# Patient Record
Sex: MALE | Race: OTHER
[De-identification: names, ages, dates, MRNs, and addresses within clinical notes are randomized per-mention and may not be internally consistent; named-entity substitution may affect disease eponyms.]

---

## 2020-03-16 ENCOUNTER — HOSPITAL ENCOUNTER (EMERGENCY)
Dept: HOSPITAL 12 - ER | Age: 45
Discharge: HOME | End: 2020-03-16
Payer: MEDICAID

## 2020-03-16 VITALS — BODY MASS INDEX: 28.14 KG/M2 | WEIGHT: 190 LBS | HEIGHT: 69 IN

## 2020-03-16 VITALS — DIASTOLIC BLOOD PRESSURE: 90 MMHG | SYSTOLIC BLOOD PRESSURE: 148 MMHG
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
## 2020-03-16 DIAGNOSIS — Z79.899: ICD-10-CM
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
## 2020-03-16 DIAGNOSIS — S49.92XA: Primary | ICD-10-CM

## 2020-03-16 PROCEDURE — A4663 DIALYSIS BLOOD PRESSURE CUFF: HCPCS

## 2020-03-16 NOTE — NUR
Patient discharged to home in stable conditon with friend taking patient home.  
Written and verbal after care instructions given. 

Patient verbalizes understanding of instructions. Walked out of ER with no 
distress noted.

## 2020-03-16 NOTE — NUR
Patient ambulating with steady gait. A&O x4. c/o left shoulder pain s/p 
mechanical fall 30-45 mins PTA. Patient states they installed some non slid 
luba at home and came loose with the rain that caused him to fall. Patient 
states he landed on left shouler. 10/10 on the pain scale. Patient able to move 
all digits freely. pulses palpable. denies pain radiating. Speech is clear and 
able to make needs known / follow commands. Breathing even and unlabored. 
denies any cough or SOB. Denies any  / GI distress. Patient's partner Robert 
at bedside to drive patient home

## 2020-05-04 ENCOUNTER — HOSPITAL ENCOUNTER (EMERGENCY)
Dept: HOSPITAL 12 - ER | Age: 45
LOS: 1 days | Discharge: HOME | End: 2020-05-05
Payer: COMMERCIAL

## 2020-05-04 VITALS — WEIGHT: 189 LBS | BODY MASS INDEX: 27.99 KG/M2 | HEIGHT: 69 IN

## 2020-05-04 DIAGNOSIS — S00.81XA: ICD-10-CM
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
## 2020-05-04 DIAGNOSIS — R51: ICD-10-CM

## 2020-05-04 DIAGNOSIS — S01.01XA: Primary | ICD-10-CM

## 2020-05-04 DIAGNOSIS — R40.2412: ICD-10-CM

## 2020-05-04 DIAGNOSIS — M50.323: ICD-10-CM

## 2020-05-04 PROCEDURE — A4217 STERILE WATER/SALINE, 500 ML: HCPCS

## 2020-05-04 PROCEDURE — 72125 CT NECK SPINE W/O DYE: CPT

## 2020-05-04 PROCEDURE — 70450 CT HEAD/BRAIN W/O DYE: CPT

## 2020-05-04 PROCEDURE — 99285 EMERGENCY DEPT VISIT HI MDM: CPT

## 2020-05-04 PROCEDURE — 12001 RPR S/N/AX/GEN/TRNK 2.5CM/<: CPT

## 2020-05-04 PROCEDURE — L8699 PROSTHETIC IMPLANT NOS: HCPCS

## 2020-05-04 PROCEDURE — 0HQ0XZZ REPAIR SCALP SKIN, EXTERNAL APPROACH: ICD-10-PCS | Performed by: EMERGENCY MEDICINE

## 2020-05-05 VITALS — DIASTOLIC BLOOD PRESSURE: 95 MMHG | SYSTOLIC BLOOD PRESSURE: 138 MMHG

## 2020-05-05 NOTE — NUR
Patient discharged to home in stable condition.  Written and verbal after care 
instructions given. 

Patient verbalizes understanding of instructions. Stressed follow up or return 
to ER for worsening s/s. Ambulatory with steady gait. So s/s of distress. 
Instructed patient not to drive. Patient stated his partner will drive him 
home.

## 2020-05-10 ENCOUNTER — HOSPITAL ENCOUNTER (EMERGENCY)
Dept: HOSPITAL 12 - ER | Age: 45
Discharge: HOME | End: 2020-05-10
Payer: COMMERCIAL

## 2020-05-10 VITALS — BODY MASS INDEX: 27.25 KG/M2 | WEIGHT: 184 LBS | HEIGHT: 69 IN

## 2020-05-10 VITALS — SYSTOLIC BLOOD PRESSURE: 138 MMHG | DIASTOLIC BLOOD PRESSURE: 79 MMHG
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
## 2020-05-10 DIAGNOSIS — S01.01XD: Primary | ICD-10-CM

## 2020-05-10 PROCEDURE — A4663 DIALYSIS BLOOD PRESSURE CUFF: HCPCS

## 2021-02-28 ENCOUNTER — HOSPITAL ENCOUNTER (EMERGENCY)
Dept: HOSPITAL 12 - ER | Age: 46
Discharge: HOME | End: 2021-02-28
Payer: COMMERCIAL

## 2021-02-28 VITALS — SYSTOLIC BLOOD PRESSURE: 150 MMHG | DIASTOLIC BLOOD PRESSURE: 96 MMHG

## 2021-02-28 VITALS — BODY MASS INDEX: 25.48 KG/M2 | HEIGHT: 69 IN | WEIGHT: 172 LBS
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
## 2021-02-28 DIAGNOSIS — S62.307A: Primary | ICD-10-CM

## 2021-02-28 PROCEDURE — A4663 DIALYSIS BLOOD PRESSURE CUFF: HCPCS

## 2021-10-06 ENCOUNTER — HOSPITAL ENCOUNTER (EMERGENCY)
Dept: HOSPITAL 12 - ER | Age: 46
Discharge: LEFT BEFORE DISCHARGE | End: 2021-10-06
Payer: SELF-PAY

## 2021-10-06 DIAGNOSIS — Z53.21: Primary | ICD-10-CM

## 2022-04-21 ENCOUNTER — APPOINTMENT (OUTPATIENT)
Dept: HEART AND VASCULAR | Facility: CLINIC | Age: 47
End: 2022-04-21
Payer: MEDICAID

## 2022-04-21 VITALS
RESPIRATION RATE: 14 BRPM | SYSTOLIC BLOOD PRESSURE: 140 MMHG | HEART RATE: 100 BPM | OXYGEN SATURATION: 98 % | DIASTOLIC BLOOD PRESSURE: 80 MMHG | HEIGHT: 69 IN | TEMPERATURE: 98 F | WEIGHT: 175 LBS | BODY MASS INDEX: 25.92 KG/M2

## 2022-04-21 DIAGNOSIS — Z78.9 OTHER SPECIFIED HEALTH STATUS: ICD-10-CM

## 2022-04-21 DIAGNOSIS — T50.901A POISONING BY UNSPECIFIED DRUGS, MEDICAMENTS AND BIOLOGICAL SUBSTANCES, ACCIDENTAL (UNINTENTIONAL), INITIAL ENCOUNTER: ICD-10-CM

## 2022-04-21 PROCEDURE — 93000 ELECTROCARDIOGRAM COMPLETE: CPT

## 2022-04-21 PROCEDURE — 36415 COLL VENOUS BLD VENIPUNCTURE: CPT

## 2022-04-21 PROCEDURE — 99386 PREV VISIT NEW AGE 40-64: CPT

## 2022-04-21 NOTE — HISTORY OF PRESENT ILLNESS
[FreeTextEntry1] : looking to establish care\par shoulder pain\par needs HIV f/u\par TBI f/u\par good diet\par active\par no tob\par no etoh\par not depressed

## 2022-04-21 NOTE — REVIEW OF SYSTEMS
[Headache] : headache [Joint Pain] : joint pain [Joint Stiffness] : joint stiffness [Tingling (Paresthesia)] : tingling [Weakness] : weakness [Negative] : Heme/Lymph

## 2022-04-22 LAB
ALBUMIN SERPL ELPH-MCNC: 4.2 G/DL
ALP BLD-CCNC: 120 U/L
ALT SERPL-CCNC: 19 U/L
ANION GAP SERPL CALC-SCNC: 11 MMOL/L
APPEARANCE: CLEAR
AST SERPL-CCNC: 16 U/L
BASOPHILS # BLD AUTO: 0.01 K/UL
BASOPHILS NFR BLD AUTO: 0.1 %
BILIRUB SERPL-MCNC: 0.3 MG/DL
BILIRUBIN URINE: NEGATIVE
BLOOD URINE: NEGATIVE
BUN SERPL-MCNC: 20 MG/DL
CALCIUM SERPL-MCNC: 9.6 MG/DL
CHLORIDE SERPL-SCNC: 99 MMOL/L
CHOLEST SERPL-MCNC: 205 MG/DL
CO2 SERPL-SCNC: 26 MMOL/L
COLOR: YELLOW
CREAT SERPL-MCNC: 1.01 MG/DL
CREAT SPEC-SCNC: 133 MG/DL
EGFR: 92 ML/MIN/1.73M2
EOSINOPHIL # BLD AUTO: 0.15 K/UL
EOSINOPHIL NFR BLD AUTO: 2.2 %
ESTIMATED AVERAGE GLUCOSE: 117 MG/DL
GLUCOSE QUALITATIVE U: NEGATIVE
GLUCOSE SERPL-MCNC: 93 MG/DL
HBA1C MFR BLD HPLC: 5.7 %
HCT VFR BLD CALC: 44.1 %
HDLC SERPL-MCNC: 38 MG/DL
HGB BLD-MCNC: 14.1 G/DL
HIV1 RNA # SERPL NAA+PROBE: ABNORMAL
HIV1 RNA # SERPL NAA+PROBE: ABNORMAL COPIES/ML
IMM GRANULOCYTES NFR BLD AUTO: 0.4 %
KETONES URINE: NEGATIVE
LDLC SERPL CALC-MCNC: 149 MG/DL
LEUKOCYTE ESTERASE URINE: NEGATIVE
LYMPHOCYTES # BLD AUTO: 1.82 K/UL
LYMPHOCYTES NFR BLD AUTO: 26.9 %
MAN DIFF?: NORMAL
MCHC RBC-ENTMCNC: 27.9 PG
MCHC RBC-ENTMCNC: 32 GM/DL
MCV RBC AUTO: 87.3 FL
MICROALBUMIN 24H UR DL<=1MG/L-MCNC: <1.2 MG/DL
MICROALBUMIN/CREAT 24H UR-RTO: NORMAL MG/G
MONOCYTES # BLD AUTO: 0.6 K/UL
MONOCYTES NFR BLD AUTO: 8.9 %
NEUTROPHILS # BLD AUTO: 4.15 K/UL
NEUTROPHILS NFR BLD AUTO: 61.5 %
NITRITE URINE: NEGATIVE
NONHDLC SERPL-MCNC: 167 MG/DL
PH URINE: 6
PLATELET # BLD AUTO: 365 K/UL
POTASSIUM SERPL-SCNC: 4.6 MMOL/L
PROT SERPL-MCNC: 8.2 G/DL
PROTEIN URINE: NEGATIVE
RBC # BLD: 5.05 M/UL
RBC # FLD: 12.7 %
SODIUM SERPL-SCNC: 136 MMOL/L
SPECIFIC GRAVITY URINE: 1.03
TRIGL SERPL-MCNC: 90 MG/DL
TSH SERPL-ACNC: 1.15 UIU/ML
UROBILINOGEN URINE: NORMAL
VIRAL LOAD INTERP: NORMAL
VIRAL LOAD LOG: ABNORMAL LG COP/ML
WBC # FLD AUTO: 6.76 K/UL

## 2022-05-02 ENCOUNTER — APPOINTMENT (OUTPATIENT)
Dept: ORTHOPEDIC SURGERY | Facility: CLINIC | Age: 47
End: 2022-05-02

## 2022-05-05 ENCOUNTER — APPOINTMENT (OUTPATIENT)
Age: 47
End: 2022-05-05

## 2022-05-10 ENCOUNTER — APPOINTMENT (OUTPATIENT)
Dept: ORTHOPEDIC SURGERY | Facility: CLINIC | Age: 47
End: 2022-05-10
Payer: MEDICAID

## 2022-05-10 VITALS — WEIGHT: 175 LBS | BODY MASS INDEX: 25.92 KG/M2 | HEIGHT: 69 IN

## 2022-05-10 DIAGNOSIS — Z86.79 PERSONAL HISTORY OF OTHER DISEASES OF THE CIRCULATORY SYSTEM: ICD-10-CM

## 2022-05-10 DIAGNOSIS — Z21 ASYMPTOMATIC HUMAN IMMUNODEFICIENCY VIRUS [HIV] INFECTION STATUS: ICD-10-CM

## 2022-05-10 PROCEDURE — 73030 X-RAY EXAM OF SHOULDER: CPT | Mod: RT

## 2022-05-10 PROCEDURE — 99203 OFFICE O/P NEW LOW 30 MIN: CPT

## 2022-05-19 ENCOUNTER — LABORATORY RESULT (OUTPATIENT)
Age: 47
End: 2022-05-19

## 2022-05-19 ENCOUNTER — APPOINTMENT (OUTPATIENT)
Dept: INFECTIOUS DISEASE | Facility: CLINIC | Age: 47
End: 2022-05-19
Payer: MEDICAID

## 2022-05-19 VITALS
HEIGHT: 69 IN | BODY MASS INDEX: 26.81 KG/M2 | HEART RATE: 110 BPM | DIASTOLIC BLOOD PRESSURE: 115 MMHG | OXYGEN SATURATION: 100 % | WEIGHT: 181 LBS | TEMPERATURE: 96.5 F | SYSTOLIC BLOOD PRESSURE: 160 MMHG

## 2022-05-19 PROCEDURE — 99203 OFFICE O/P NEW LOW 30 MIN: CPT

## 2022-05-19 NOTE — HISTORY OF PRESENT ILLNESS
[FreeTextEntry1] : 47 year old male with HIV (diagnosed in 1992) here to establish care. He recently moved from [place] to [facility] and needs HIV care/folllow up.  He has been on HIV medications since the 1990's (Combivir, atripla, sustiva).  Around 2016 he was switched to Genvoya.  Reports missing doses occasionally (about 6 missed doses over the past month). Notices issues with his gums since starting Genvoya and would interested in transitioning to different agent. MSM.  No signs/symptoms of STDs

## 2022-05-19 NOTE — PHYSICAL EXAM
[General Appearance - Alert] : alert [Sclera] : the sclera and conjunctiva were normal [Outer Ear] : the ears and nose were normal in appearance [Neck Appearance] : the appearance of the neck was normal [Heart Rate And Rhythm] : heart rate was normal and rhythm regular [Edema] : there was no peripheral edema [Abdomen Soft] : soft [No Palpable Adenopathy] : no palpable adenopathy [Musculoskeletal - Swelling] : no joint swelling [] : no rash [Motor Exam] : the motor exam was normal [Oriented To Time, Place, And Person] : oriented to person, place, and time

## 2022-05-20 LAB
CD3 CELLS # BLD: 1134 /UL
CD3 CELLS NFR BLD: 70 %
CD3+CD4+ CELLS # BLD: 623 /UL
CD3+CD4+ CELLS NFR BLD: 39 %
CD3+CD4+ CELLS/CD3+CD8+ CLL SPEC: 1.24 RATIO
CD3+CD8+ CELLS # SPEC: 503 /UL
CD3+CD8+ CELLS NFR BLD: 31 %

## 2022-05-23 LAB
C TRACH RRNA SPEC QL NAA+PROBE: NOT DETECTED
CMV IGG SERPL QL: 6.9 U/ML
CMV IGG SERPL-IMP: POSITIVE
CMV IGM SERPL QL: 32.7 AU/ML
CMV IGM SERPL QL: ABNORMAL
EBV EA AB SER IA-ACNC: 10.5 U/ML
EBV EA AB TITR SER IF: POSITIVE
EBV EA IGG SER QL IA: 92.6 U/ML
EBV EA IGG SER-ACNC: ABNORMAL
EBV EA IGM SER IA-ACNC: NEGATIVE
EBV PATRN SPEC IB-IMP: NORMAL
EBV VCA IGG SER IA-ACNC: >750 U/ML
EBV VCA IGM SER QL IA: 14.3 U/ML
EPSTEIN-BARR VIRUS CAPSID ANTIGEN IGG: POSITIVE
N GONORRHOEA RRNA SPEC QL NAA+PROBE: NOT DETECTED
SOURCE AMPLIFICATION: NORMAL
SOURCE ANAL: NORMAL
SOURCE ORAL: NORMAL

## 2022-05-25 ENCOUNTER — NON-APPOINTMENT (OUTPATIENT)
Age: 47
End: 2022-05-25

## 2022-05-25 LAB — T PALLIDUM AB SER QL IA: POSITIVE

## 2022-05-26 LAB — HLA-B*57:01 QL: NEGATIVE

## 2022-06-02 ENCOUNTER — APPOINTMENT (OUTPATIENT)
Dept: INFECTIOUS DISEASE | Facility: CLINIC | Age: 47
End: 2022-06-02
Payer: MEDICAID

## 2022-06-02 VITALS
WEIGHT: 181 LBS | DIASTOLIC BLOOD PRESSURE: 105 MMHG | BODY MASS INDEX: 26.81 KG/M2 | OXYGEN SATURATION: 100 % | HEART RATE: 125 BPM | SYSTOLIC BLOOD PRESSURE: 149 MMHG | HEIGHT: 69 IN | TEMPERATURE: 97.1 F

## 2022-06-02 PROCEDURE — 99213 OFFICE O/P EST LOW 20 MIN: CPT | Mod: 25

## 2022-06-02 PROCEDURE — 96372 THER/PROPH/DIAG INJ SC/IM: CPT

## 2022-06-02 RX ORDER — PENICILLIN G BENZATHINE 2400000 [IU]/4ML
2400000 INJECTION, SUSPENSION INTRAMUSCULAR
Qty: 0 | Refills: 0 | Status: COMPLETED | OUTPATIENT
Start: 2022-06-02

## 2022-06-02 RX ADMIN — PENICILLIN G BENZATHINE 0 UNIT/4ML: 2400000 INJECTION, SUSPENSION INTRAMUSCULAR at 00:00

## 2022-06-02 NOTE — HISTORY OF PRESENT ILLNESS
[FreeTextEntry1] : 48 y/o male with HIV here for treatment of syphilis.  Was diagnosed on recent blood work.  Has never tested positive before.  He sent over blood work from 2015 that showed negative treponemal antibody and negative RPR.  Now he tested positive for treponemal antibody with RPR 1:32.  Has no lesions, no rash, no enlarged lymph nodes, no fevers.

## 2022-06-07 ENCOUNTER — APPOINTMENT (OUTPATIENT)
Dept: ORTHOPEDIC SURGERY | Facility: CLINIC | Age: 47
End: 2022-06-07

## 2022-06-09 ENCOUNTER — APPOINTMENT (OUTPATIENT)
Dept: INFECTIOUS DISEASE | Facility: CLINIC | Age: 47
End: 2022-06-09
Payer: MEDICAID

## 2022-06-09 ENCOUNTER — APPOINTMENT (OUTPATIENT)
Dept: ORTHOPEDIC SURGERY | Facility: CLINIC | Age: 47
End: 2022-06-09

## 2022-06-09 ENCOUNTER — APPOINTMENT (OUTPATIENT)
Dept: INFECTIOUS DISEASE | Facility: CLINIC | Age: 47
End: 2022-06-09

## 2022-06-09 VITALS
WEIGHT: 181 LBS | TEMPERATURE: 97.7 F | OXYGEN SATURATION: 99 % | HEIGHT: 69 IN | DIASTOLIC BLOOD PRESSURE: 108 MMHG | SYSTOLIC BLOOD PRESSURE: 162 MMHG | HEART RATE: 113 BPM | BODY MASS INDEX: 26.81 KG/M2

## 2022-06-09 PROCEDURE — 96372 THER/PROPH/DIAG INJ SC/IM: CPT

## 2022-06-09 PROCEDURE — ZZZZZ: CPT

## 2022-06-09 RX ORDER — PENICILLIN G BENZATHINE 2400000 [IU]/4ML
2400000 INJECTION, SUSPENSION INTRAMUSCULAR
Qty: 0 | Refills: 0 | Status: COMPLETED | OUTPATIENT
Start: 2022-06-09

## 2022-06-09 RX ADMIN — PENICILLIN G BENZATHINE 0 UNIT/4ML: 2400000 INJECTION, SUSPENSION INTRAMUSCULAR at 00:00

## 2022-06-09 NOTE — HISTORY OF PRESENT ILLNESS
[FreeTextEntry1] : 47 year old male with HIV here for syphilis treatment.  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 He received his first dose of IM penicillin last week.  He is here for his second dose.  He did feel feverish in the first 24 hrs after the last penicillin dose.  No rash. No other side effects.

## 2022-06-16 ENCOUNTER — APPOINTMENT (OUTPATIENT)
Dept: INFECTIOUS DISEASE | Facility: CLINIC | Age: 47
End: 2022-06-16

## 2022-06-16 ENCOUNTER — NON-APPOINTMENT (OUTPATIENT)
Age: 47
End: 2022-06-16

## 2022-06-17 ENCOUNTER — APPOINTMENT (OUTPATIENT)
Dept: INFECTIOUS DISEASE | Facility: CLINIC | Age: 47
End: 2022-06-17
Payer: MEDICAID

## 2022-06-17 PROCEDURE — 96372 THER/PROPH/DIAG INJ SC/IM: CPT

## 2022-06-17 RX ORDER — PENICILLIN G BENZATHINE 2400000 [IU]/4ML
2400000 INJECTION, SUSPENSION INTRAMUSCULAR
Qty: 0 | Refills: 0 | Status: COMPLETED | OUTPATIENT
Start: 2022-06-17

## 2022-06-17 RX ADMIN — PENICILLIN G BENZATHINE 4 UNIT/4ML: 2400000 INJECTION, SUSPENSION INTRAMUSCULAR at 00:00

## 2022-06-17 NOTE — HISTORY OF PRESENT ILLNESS
[FreeTextEntry1] : 47 year old male with HIV here for syphilis treatment.  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 He received doses on 6/2 and 6/9 - tolerated well.  He presents for third dose.

## 2022-06-22 ENCOUNTER — APPOINTMENT (OUTPATIENT)
Dept: INFECTIOUS DISEASE | Facility: CLINIC | Age: 47
End: 2022-06-22

## 2022-06-23 ENCOUNTER — APPOINTMENT (OUTPATIENT)
Dept: HEART AND VASCULAR | Facility: CLINIC | Age: 47
End: 2022-06-23
Payer: MEDICAID

## 2022-06-23 PROCEDURE — 99442: CPT

## 2022-07-06 ENCOUNTER — TRANSCRIPTION ENCOUNTER (OUTPATIENT)
Age: 47
End: 2022-07-06

## 2022-07-08 ENCOUNTER — TRANSCRIPTION ENCOUNTER (OUTPATIENT)
Age: 47
End: 2022-07-08

## 2022-07-11 ENCOUNTER — TRANSCRIPTION ENCOUNTER (OUTPATIENT)
Age: 47
End: 2022-07-11

## 2022-07-14 ENCOUNTER — LABORATORY RESULT (OUTPATIENT)
Age: 47
End: 2022-07-14

## 2022-07-14 ENCOUNTER — APPOINTMENT (OUTPATIENT)
Dept: INFECTIOUS DISEASE | Facility: CLINIC | Age: 47
End: 2022-07-14

## 2022-07-14 VITALS
WEIGHT: 181 LBS | HEIGHT: 69 IN | OXYGEN SATURATION: 100 % | SYSTOLIC BLOOD PRESSURE: 141 MMHG | TEMPERATURE: 97.3 F | BODY MASS INDEX: 26.81 KG/M2 | HEART RATE: 109 BPM | DIASTOLIC BLOOD PRESSURE: 96 MMHG

## 2022-07-14 PROCEDURE — 99213 OFFICE O/P EST LOW 20 MIN: CPT

## 2022-07-14 NOTE — HISTORY OF PRESENT ILLNESS
[FreeTextEntry1] : 47 year old male with HIV here for follow up.  He was placed on Biktarvy in May 2022 but stopped it one month later due to weight gain.  He has been off all HIV medications since mid June.  He presents today to discuss different treatment options.  He has been on atripla and combivir in the past.  Was recently on Genvoya which caused his gums to bleed and biktarvy which he self-discontinued due to weight gain.  We discussed Abacavir which is first line (He is [id number] negative)

## 2022-07-14 NOTE — PHYSICAL EXAM
[General Appearance - Alert] : alert [Sclera] : the sclera and conjunctiva were normal [Outer Ear] : the ears and nose were normal in appearance [Edema] : there was no peripheral edema [Heart Rate And Rhythm] : heart rate was normal and rhythm regular [Abdomen Soft] : soft [No Palpable Adenopathy] : no palpable adenopathy [Musculoskeletal - Swelling] : no joint swelling [] : no rash [Motor Exam] : the motor exam was normal [No Focal Deficits] : no focal deficits [Oriented To Time, Place, And Person] : oriented to person, place, and time

## 2022-07-15 LAB
ALBUMIN SERPL ELPH-MCNC: 4.4 G/DL
ALP BLD-CCNC: 109 U/L
ALT SERPL-CCNC: 28 U/L
ANION GAP SERPL CALC-SCNC: 12 MMOL/L
AST SERPL-CCNC: 23 U/L
BASOPHILS # BLD AUTO: 0 K/UL
BASOPHILS NFR BLD AUTO: 0 %
BILIRUB SERPL-MCNC: 0.2 MG/DL
BUN SERPL-MCNC: 12 MG/DL
CALCIUM SERPL-MCNC: 9.2 MG/DL
CD3 CELLS # BLD: 1217 /UL
CD3 CELLS NFR BLD: 71 %
CD3+CD4+ CELLS # BLD: 604 /UL
CD3+CD4+ CELLS NFR BLD: 35 %
CD3+CD4+ CELLS/CD3+CD8+ CLL SPEC: 0.99 RATIO
CD3+CD8+ CELLS # SPEC: 609 /UL
CD3+CD8+ CELLS NFR BLD: 36 %
CHLORIDE SERPL-SCNC: 103 MMOL/L
CO2 SERPL-SCNC: 27 MMOL/L
CREAT SERPL-MCNC: 1.08 MG/DL
EGFR: 85 ML/MIN/1.73M2
EOSINOPHIL # BLD AUTO: 0.11 K/UL
EOSINOPHIL NFR BLD AUTO: 1.8 %
GLUCOSE SERPL-MCNC: 75 MG/DL
HCT VFR BLD CALC: 45.8 %
HGB BLD-MCNC: 14.1 G/DL
HIV1 RNA # SERPL NAA+PROBE: ABNORMAL
HIV1 RNA # SERPL NAA+PROBE: NORMAL
LYMPHOCYTES # BLD AUTO: 2.05 K/UL
LYMPHOCYTES NFR BLD AUTO: 33.6 %
MAN DIFF?: NORMAL
MCHC RBC-ENTMCNC: 27.2 PG
MCHC RBC-ENTMCNC: 30.8 GM/DL
MCV RBC AUTO: 88.4 FL
MONOCYTES # BLD AUTO: 0.38 K/UL
MONOCYTES NFR BLD AUTO: 6.2 %
NEUTROPHILS # BLD AUTO: 3.45 K/UL
NEUTROPHILS NFR BLD AUTO: 56.6 %
PLATELET # BLD AUTO: 241 K/UL
POTASSIUM SERPL-SCNC: 4.3 MMOL/L
PROT SERPL-MCNC: 7.9 G/DL
RBC # BLD: 5.18 M/UL
RBC # FLD: 16.8 %
SODIUM SERPL-SCNC: 142 MMOL/L
VIRAL LOAD INTERP: NORMAL
VIRAL LOAD LOG: 5.6
WBC # FLD AUTO: 6.1 K/UL

## 2022-07-18 LAB
HBV CORE IGG+IGM SER QL: NONREACTIVE
HBV SURFACE AB SER QL: REACTIVE
HBV SURFACE AG SER QL: NONREACTIVE
HCV AB SER QL: NONREACTIVE
HCV S/CO RATIO: 0.16 S/CO

## 2022-07-27 ENCOUNTER — APPOINTMENT (OUTPATIENT)
Dept: INFECTIOUS DISEASE | Facility: CLINIC | Age: 47
End: 2022-07-27

## 2022-07-27 VITALS
WEIGHT: 179.38 LBS | OXYGEN SATURATION: 100 % | SYSTOLIC BLOOD PRESSURE: 143 MMHG | BODY MASS INDEX: 26.57 KG/M2 | HEIGHT: 69 IN | TEMPERATURE: 97.2 F | HEART RATE: 99 BPM | DIASTOLIC BLOOD PRESSURE: 95 MMHG

## 2022-07-27 LAB
BICTEGRAVIR RESISTANCE: NORMAL
CABOTEGRAVIR RESISTANCE: NORMAL
DOLUTEGRAVIR RESISTANCE: NORMAL
ELVITEGRAVIR RESISTANCE: NORMAL
HIV RT GENE MUT DET ISLT: NORMAL
HIV-1 GENOTYPE DRUG RESISTANCE: DETECTED
HIV-1 INTEGRASE GENOTYPE: NORMAL
RALTEGRAVIR RESISTANCE: NORMAL
VIRAL LOAD DATE: NORMAL

## 2022-07-27 PROCEDURE — 99213 OFFICE O/P EST LOW 20 MIN: CPT | Mod: 25

## 2022-07-27 PROCEDURE — 36415 COLL VENOUS BLD VENIPUNCTURE: CPT

## 2022-07-27 RX ORDER — ELVITEGRAVIR, COBICISTAT, EMTRICITABINE, AND TENOFOVIR ALAFENAMIDE 150; 150; 200; 10 MG/1; MG/1; MG/1; MG/1
150-150-200-10 TABLET ORAL DAILY
Qty: 30 | Refills: 3 | Status: COMPLETED | COMMUNITY
End: 2022-07-27

## 2022-07-27 RX ORDER — BICTEGRAVIR SODIUM, EMTRICITABINE, AND TENOFOVIR ALAFENAMIDE FUMARATE 50; 200; 25 MG/1; MG/1; MG/1
50-200-25 TABLET ORAL
Qty: 30 | Refills: 1 | Status: COMPLETED | COMMUNITY
Start: 2022-05-19 | End: 2022-07-27

## 2022-07-27 RX ORDER — METHOCARBAMOL 750 MG/1
TABLET, FILM COATED ORAL
Refills: 0 | Status: COMPLETED | COMMUNITY
End: 2022-07-27

## 2022-07-27 RX ORDER — MELOXICAM 15 MG/1
15 TABLET ORAL
Refills: 0 | Status: COMPLETED | COMMUNITY
End: 2022-07-27

## 2022-07-27 RX ORDER — MELOXICAM 15 MG/1
15 TABLET ORAL
Qty: 15 | Refills: 0 | Status: COMPLETED | COMMUNITY
Start: 2022-05-12 | End: 2022-07-27

## 2022-07-27 NOTE — ASSESSMENT
[Treatment Education] : treatment education [Treatment Adherence] : treatment adherence [HIV Education] : HIV Education

## 2022-07-27 NOTE — HISTORY OF PRESENT ILLNESS
[FreeTextEntry1] : 47 year old male with HIV here for follow up.  He was placed on Biktarvy in May 2022 but stopped mid-June due to weight gain.  He started Triumeq on 7/14. He is tolerating well, denies adverse side effects. HIV VL was 398,548 at time therapy was initiated. \par \par He was recently treated for late latent syphilis. He is requesting to review diagnosis and interpretation of titers.

## 2022-07-27 NOTE — PHYSICAL EXAM
[facility] Emergency Department    [place]  [address] [address]    Phone:  [phone]    Fax:  [phone]           Tierney [place]   MRN: [id number]    Department:  [facility] Emergency Department   Date of Visit: IF THERE IS ANY CHANGE OR WORSENING OF YOUR CONDITION, CALL YOUR PRIMARY CARE PHYSICIAN AT ONCE OR RETURN IMMEDIATELY TO THE EMERGENCY DEPARTMENT.     If you have been prescribed any medication(s), please fill your prescription right away and begin taking t [General Appearance - Alert] : alert [General Appearance - In No Acute Distress] : in no acute distress [Sclera] : the sclera and conjunctiva were normal [PERRL With Normal Accommodation] : pupils were equal in size, round, reactive to light [Oropharynx] : the oropharynx was normal with no thrush [Respiration, Rhythm And Depth] : normal respiratory rhythm and effort [Auscultation Breath Sounds / Voice Sounds] : lungs were clear to auscultation bilaterally [Heart Rate And Rhythm] : heart rate was normal and rhythm regular [Heart Sounds] : normal S1 and S2 [Abdomen Soft] : soft [Abdomen Tenderness] : non-tender [No Palpable Adenopathy] : no palpable adenopathy [] : no rash [FreeTextEntry1] : mild nonpitting edema bilateral ankles

## 2022-07-28 LAB
ALBUMIN SERPL ELPH-MCNC: 4.4 G/DL
ALP BLD-CCNC: 98 U/L
ALT SERPL-CCNC: 16 U/L
ANION GAP SERPL CALC-SCNC: 11 MMOL/L
AST SERPL-CCNC: 18 U/L
BASOPHILS # BLD AUTO: 0.01 K/UL
BASOPHILS NFR BLD AUTO: 0.1 %
BILIRUB SERPL-MCNC: 0.2 MG/DL
BUN SERPL-MCNC: 20 MG/DL
CALCIUM SERPL-MCNC: 9.8 MG/DL
CD3 CELLS # BLD: 2052 /UL
CD3 CELLS NFR BLD: 68 %
CD3+CD4+ CELLS # BLD: 927 /UL
CD3+CD4+ CELLS NFR BLD: 31 %
CD3+CD4+ CELLS/CD3+CD8+ CLL SPEC: 0.83 RATIO
CD3+CD8+ CELLS # SPEC: 1115 /UL
CD3+CD8+ CELLS NFR BLD: 37 %
CHLORIDE SERPL-SCNC: 105 MMOL/L
CO2 SERPL-SCNC: 26 MMOL/L
CREAT SERPL-MCNC: 1.29 MG/DL
EGFR: 68 ML/MIN/1.73M2
EOSINOPHIL # BLD AUTO: 0.15 K/UL
EOSINOPHIL NFR BLD AUTO: 2.1 %
GLUCOSE SERPL-MCNC: 94 MG/DL
HCT VFR BLD CALC: 45.1 %
HGB BLD-MCNC: 14.4 G/DL
HIV1 RNA # SERPL NAA+PROBE: 121
HIV1 RNA # SERPL NAA+PROBE: ABNORMAL
IMM GRANULOCYTES NFR BLD AUTO: 0.3 %
LYMPHOCYTES # BLD AUTO: 3.02 K/UL
LYMPHOCYTES NFR BLD AUTO: 42.2 %
MAN DIFF?: NORMAL
MCHC RBC-ENTMCNC: 27.1 PG
MCHC RBC-ENTMCNC: 31.9 GM/DL
MCV RBC AUTO: 84.9 FL
MONOCYTES # BLD AUTO: 0.6 K/UL
MONOCYTES NFR BLD AUTO: 8.4 %
NEUTROPHILS # BLD AUTO: 3.35 K/UL
NEUTROPHILS NFR BLD AUTO: 46.9 %
PLATELET # BLD AUTO: 258 K/UL
POTASSIUM SERPL-SCNC: 4.3 MMOL/L
PROT SERPL-MCNC: 7.5 G/DL
RBC # BLD: 5.31 M/UL
RBC # FLD: 15.2 %
SODIUM SERPL-SCNC: 143 MMOL/L
VIRAL LOAD INTERP: NORMAL
VIRAL LOAD LOG: 2.08
WBC # FLD AUTO: 7.15 K/UL

## 2022-08-04 ENCOUNTER — TRANSCRIPTION ENCOUNTER (OUTPATIENT)
Age: 47
End: 2022-08-04

## 2022-08-05 ENCOUNTER — TRANSCRIPTION ENCOUNTER (OUTPATIENT)
Age: 47
End: 2022-08-05

## 2022-09-08 ENCOUNTER — TRANSCRIPTION ENCOUNTER (OUTPATIENT)
Age: 47
End: 2022-09-08

## 2022-09-09 ENCOUNTER — TRANSCRIPTION ENCOUNTER (OUTPATIENT)
Age: 47
End: 2022-09-09

## 2022-09-09 ENCOUNTER — APPOINTMENT (OUTPATIENT)
Age: 47
End: 2022-09-09

## 2022-09-14 ENCOUNTER — RX RENEWAL (OUTPATIENT)
Age: 47
End: 2022-09-14

## 2022-09-22 ENCOUNTER — NON-APPOINTMENT (OUTPATIENT)
Age: 47
End: 2022-09-22

## 2022-09-22 ENCOUNTER — APPOINTMENT (OUTPATIENT)
Age: 47
End: 2022-09-22

## 2022-09-22 VITALS
OXYGEN SATURATION: 100 % | DIASTOLIC BLOOD PRESSURE: 97 MMHG | HEIGHT: 69 IN | WEIGHT: 180 LBS | SYSTOLIC BLOOD PRESSURE: 147 MMHG | TEMPERATURE: 97.3 F | BODY MASS INDEX: 26.66 KG/M2 | HEART RATE: 90 BPM

## 2022-09-22 PROCEDURE — 99205 OFFICE O/P NEW HI 60 MIN: CPT

## 2022-09-29 ENCOUNTER — APPOINTMENT (OUTPATIENT)
Dept: INFECTIOUS DISEASE | Facility: CLINIC | Age: 47
End: 2022-09-29

## 2022-09-29 PROCEDURE — 99213 OFFICE O/P EST LOW 20 MIN: CPT

## 2022-09-29 NOTE — HISTORY OF PRESENT ILLNESS
[FreeTextEntry1] : 47 year old male with HIV here for follow up. He was placed on Biktarvy in May 2022 but stopped it one month later due to weight gain.  He was off HIV medications in mid-June  and then started Triumeq in July 2022.  He reports being 100 % compliant with triumeq.  He is tolerating it well without side effects. He does not report weight gain while on it.

## 2022-10-03 LAB
ALBUMIN SERPL ELPH-MCNC: 4.2 G/DL
ALP BLD-CCNC: 88 U/L
ALT SERPL-CCNC: 37 U/L
ANION GAP SERPL CALC-SCNC: 11 MMOL/L
AST SERPL-CCNC: 51 U/L
BASOPHILS # BLD AUTO: 0.02 K/UL
BASOPHILS NFR BLD AUTO: 0.3 %
BILIRUB SERPL-MCNC: 0.4 MG/DL
BUN SERPL-MCNC: 13 MG/DL
CALCIUM SERPL-MCNC: 9.6 MG/DL
CD16+CD56+ CELLS # BLD: 229 /UL
CD16+CD56+ CELLS NFR BLD: 8 %
CD19 CELLS NFR BLD: 431 /UL
CD3 CELLS # BLD: 2001 /UL
CD3 CELLS NFR BLD: 74 %
CD3+CD4+ CELLS # BLD: 1154 /UL
CD3+CD4+ CELLS NFR BLD: 44 %
CD3+CD4+ CELLS/CD3+CD8+ CLL SPEC: 1.44 RATIO
CD3+CD8+ CELLS # SPEC: 800 /UL
CD3+CD8+ CELLS NFR BLD: 30 %
CELLS.CD3-CD19+/CELLS IN BLOOD: 16 %
CHLORIDE SERPL-SCNC: 104 MMOL/L
CHOLEST SERPL-MCNC: 227 MG/DL
CO2 SERPL-SCNC: 28 MMOL/L
CREAT SERPL-MCNC: 1.27 MG/DL
EGFR: 70 ML/MIN/1.73M2
EOSINOPHIL # BLD AUTO: 0.2 K/UL
EOSINOPHIL NFR BLD AUTO: 3.4 %
GLUCOSE SERPL-MCNC: 94 MG/DL
HCT VFR BLD CALC: 44.8 %
HDLC SERPL-MCNC: 50 MG/DL
HGB BLD-MCNC: 13.9 G/DL
HIV1 RNA # SERPL NAA+PROBE: ABNORMAL
HIV1 RNA # SERPL NAA+PROBE: ABNORMAL COPIES/ML
IMM GRANULOCYTES NFR BLD AUTO: 0.2 %
LDLC SERPL CALC-MCNC: 148 MG/DL
LYMPHOCYTES # BLD AUTO: 2.92 K/UL
LYMPHOCYTES NFR BLD AUTO: 50 %
MAN DIFF?: NORMAL
MCHC RBC-ENTMCNC: 28.1 PG
MCHC RBC-ENTMCNC: 31 GM/DL
MCV RBC AUTO: 90.5 FL
MONOCYTES # BLD AUTO: 0.56 K/UL
MONOCYTES NFR BLD AUTO: 9.6 %
NEUTROPHILS # BLD AUTO: 2.13 K/UL
NEUTROPHILS NFR BLD AUTO: 36.5 %
NONHDLC SERPL-MCNC: 177 MG/DL
PLATELET # BLD AUTO: 287 K/UL
POTASSIUM SERPL-SCNC: 4.3 MMOL/L
PROT SERPL-MCNC: 7 G/DL
RBC # BLD: 4.95 M/UL
RBC # FLD: 15.7 %
SODIUM SERPL-SCNC: 142 MMOL/L
TRIGL SERPL-MCNC: 147 MG/DL
VIRAL LOAD INTERP: NORMAL
VIRAL LOAD LOG: ABNORMAL LG COP/ML
WBC # FLD AUTO: 5.84 K/UL

## 2022-10-04 LAB — RPR SER-TITR: ABNORMAL

## 2022-10-13 ENCOUNTER — INPATIENT (INPATIENT)
Facility: HOSPITAL | Age: 47
LOS: 0 days | Discharge: HOME | DRG: 975 | End: 2022-10-14
Attending: HOSPITALIST | Admitting: STUDENT IN AN ORGANIZED HEALTH CARE EDUCATION/TRAINING PROGRAM
Payer: MEDICAID

## 2022-10-13 VITALS
DIASTOLIC BLOOD PRESSURE: 85 MMHG | TEMPERATURE: 98 F | HEIGHT: 69 IN | HEART RATE: 119 BPM | RESPIRATION RATE: 18 BRPM | SYSTOLIC BLOOD PRESSURE: 131 MMHG | OXYGEN SATURATION: 100 % | WEIGHT: 179.9 LBS

## 2022-10-13 DIAGNOSIS — B20 HUMAN IMMUNODEFICIENCY VIRUS [HIV] DISEASE: ICD-10-CM

## 2022-10-13 DIAGNOSIS — A44.9 BARTONELLOSIS, UNSPECIFIED: ICD-10-CM

## 2022-10-13 DIAGNOSIS — Z29.9 ENCOUNTER FOR PROPHYLACTIC MEASURES, UNSPECIFIED: ICD-10-CM

## 2022-10-13 DIAGNOSIS — F41.9 ANXIETY DISORDER, UNSPECIFIED: ICD-10-CM

## 2022-10-13 DIAGNOSIS — L03.90 CELLULITIS, UNSPECIFIED: ICD-10-CM

## 2022-10-13 DIAGNOSIS — A41.9 SEPSIS, UNSPECIFIED ORGANISM: ICD-10-CM

## 2022-10-13 DIAGNOSIS — I10 ESSENTIAL (PRIMARY) HYPERTENSION: ICD-10-CM

## 2022-10-13 LAB
ALBUMIN SERPL ELPH-MCNC: 3.7 G/DL — SIGNIFICANT CHANGE UP (ref 3.4–5)
ALP SERPL-CCNC: 84 U/L — SIGNIFICANT CHANGE UP (ref 40–120)
ALT FLD-CCNC: 34 U/L — SIGNIFICANT CHANGE UP (ref 12–42)
ANION GAP SERPL CALC-SCNC: 5 MMOL/L — LOW (ref 9–16)
APTT BLD: 28.8 SEC — SIGNIFICANT CHANGE UP (ref 27.5–35.5)
AST SERPL-CCNC: 13 U/L — LOW (ref 15–37)
BASOPHILS # BLD AUTO: 0.02 K/UL — SIGNIFICANT CHANGE UP (ref 0–0.2)
BASOPHILS NFR BLD AUTO: 0.2 % — SIGNIFICANT CHANGE UP (ref 0–2)
BILIRUB SERPL-MCNC: 0.7 MG/DL — SIGNIFICANT CHANGE UP (ref 0.2–1.2)
BUN SERPL-MCNC: 13 MG/DL — SIGNIFICANT CHANGE UP (ref 7–23)
CALCIUM SERPL-MCNC: 9.2 MG/DL — SIGNIFICANT CHANGE UP (ref 8.5–10.5)
CHLORIDE SERPL-SCNC: 100 MMOL/L — SIGNIFICANT CHANGE UP (ref 96–108)
CO2 SERPL-SCNC: 28 MMOL/L — SIGNIFICANT CHANGE UP (ref 22–31)
CREAT SERPL-MCNC: 1.17 MG/DL — SIGNIFICANT CHANGE UP (ref 0.5–1.3)
CRP SERPL-MCNC: >120 MG/L — HIGH (ref 0–9)
EGFR: 77 ML/MIN/1.73M2 — SIGNIFICANT CHANGE UP
EOSINOPHIL # BLD AUTO: 0.17 K/UL — SIGNIFICANT CHANGE UP (ref 0–0.5)
EOSINOPHIL NFR BLD AUTO: 1.3 % — SIGNIFICANT CHANGE UP (ref 0–6)
GLUCOSE SERPL-MCNC: 115 MG/DL — HIGH (ref 70–99)
HCT VFR BLD CALC: 43.3 % — SIGNIFICANT CHANGE UP (ref 39–50)
HGB BLD-MCNC: 14.9 G/DL — SIGNIFICANT CHANGE UP (ref 13–17)
IMM GRANULOCYTES NFR BLD AUTO: 0.3 % — SIGNIFICANT CHANGE UP (ref 0–0.9)
INR BLD: 1.13 — SIGNIFICANT CHANGE UP (ref 0.88–1.16)
LACTATE SERPL-SCNC: 1.7 MMOL/L — SIGNIFICANT CHANGE UP (ref 0.4–2)
LYMPHOCYTES # BLD AUTO: 16.8 % — SIGNIFICANT CHANGE UP (ref 13–44)
LYMPHOCYTES # BLD AUTO: 2.18 K/UL — SIGNIFICANT CHANGE UP (ref 1–3.3)
MCHC RBC-ENTMCNC: 29 PG — SIGNIFICANT CHANGE UP (ref 27–34)
MCHC RBC-ENTMCNC: 34.4 GM/DL — SIGNIFICANT CHANGE UP (ref 32–36)
MCV RBC AUTO: 84.4 FL — SIGNIFICANT CHANGE UP (ref 80–100)
MONOCYTES # BLD AUTO: 0.94 K/UL — HIGH (ref 0–0.9)
MONOCYTES NFR BLD AUTO: 7.2 % — SIGNIFICANT CHANGE UP (ref 2–14)
NEUTROPHILS # BLD AUTO: 9.62 K/UL — HIGH (ref 1.8–7.4)
NEUTROPHILS NFR BLD AUTO: 74.2 % — SIGNIFICANT CHANGE UP (ref 43–77)
NRBC # BLD: 0 /100 WBCS — SIGNIFICANT CHANGE UP (ref 0–0)
PLATELET # BLD AUTO: 195 K/UL — SIGNIFICANT CHANGE UP (ref 150–400)
POTASSIUM SERPL-MCNC: 4.1 MMOL/L — SIGNIFICANT CHANGE UP (ref 3.5–5.3)
POTASSIUM SERPL-SCNC: 4.1 MMOL/L — SIGNIFICANT CHANGE UP (ref 3.5–5.3)
PROT SERPL-MCNC: 8.2 G/DL — SIGNIFICANT CHANGE UP (ref 6.4–8.2)
PROTHROM AB SERPL-ACNC: 13.1 SEC — SIGNIFICANT CHANGE UP (ref 10.5–13.4)
RBC # BLD: 5.13 M/UL — SIGNIFICANT CHANGE UP (ref 4.2–5.8)
RBC # FLD: 14.6 % — HIGH (ref 10.3–14.5)
SARS-COV-2 RNA SPEC QL NAA+PROBE: SIGNIFICANT CHANGE UP
SODIUM SERPL-SCNC: 133 MMOL/L — SIGNIFICANT CHANGE UP (ref 132–145)
WBC # BLD: 12.97 K/UL — HIGH (ref 3.8–10.5)
WBC # FLD AUTO: 12.97 K/UL — HIGH (ref 3.8–10.5)

## 2022-10-13 PROCEDURE — 93010 ELECTROCARDIOGRAM REPORT: CPT

## 2022-10-13 PROCEDURE — 99285 EMERGENCY DEPT VISIT HI MDM: CPT

## 2022-10-13 PROCEDURE — 99223 1ST HOSP IP/OBS HIGH 75: CPT | Mod: GC

## 2022-10-13 RX ORDER — OXYCODONE AND ACETAMINOPHEN 5; 325 MG/1; MG/1
1 TABLET ORAL ONCE
Refills: 0 | Status: DISCONTINUED | OUTPATIENT
Start: 2022-10-13 | End: 2022-10-13

## 2022-10-13 RX ORDER — AMPICILLIN TRIHYDRATE 250 MG
3000 CAPSULE ORAL EVERY 6 HOURS
Refills: 0 | Status: DISCONTINUED | OUTPATIENT
Start: 2022-10-13 | End: 2022-10-13

## 2022-10-13 RX ORDER — AZITHROMYCIN 500 MG/1
500 TABLET, FILM COATED ORAL ONCE
Refills: 0 | Status: DISCONTINUED | OUTPATIENT
Start: 2022-10-13 | End: 2022-10-13

## 2022-10-13 RX ORDER — AMPICILLIN SODIUM AND SULBACTAM SODIUM 250; 125 MG/ML; MG/ML
INJECTION, POWDER, FOR SUSPENSION INTRAMUSCULAR; INTRAVENOUS
Refills: 0 | Status: DISCONTINUED | OUTPATIENT
Start: 2022-10-13 | End: 2022-10-13

## 2022-10-13 RX ORDER — KETOROLAC TROMETHAMINE 30 MG/ML
15 SYRINGE (ML) INJECTION ONCE
Refills: 0 | Status: DISCONTINUED | OUTPATIENT
Start: 2022-10-13 | End: 2022-10-13

## 2022-10-13 RX ORDER — AMPICILLIN SODIUM AND SULBACTAM SODIUM 250; 125 MG/ML; MG/ML
3 INJECTION, POWDER, FOR SUSPENSION INTRAMUSCULAR; INTRAVENOUS ONCE
Refills: 0 | Status: COMPLETED | OUTPATIENT
Start: 2022-10-13 | End: 2022-10-13

## 2022-10-13 RX ORDER — SERTRALINE 25 MG/1
25 TABLET, FILM COATED ORAL DAILY
Refills: 0 | Status: DISCONTINUED | OUTPATIENT
Start: 2022-10-13 | End: 2022-10-14

## 2022-10-13 RX ORDER — ABACAVIR SULFATE, DOLUTEGRAVIR SODIUM, LAMIVUDINE 60-5-30 MG
1 KIT ORAL
Qty: 0 | Refills: 0 | DISCHARGE

## 2022-10-13 RX ORDER — LOSARTAN POTASSIUM 100 MG/1
1 TABLET, FILM COATED ORAL
Qty: 0 | Refills: 0 | DISCHARGE

## 2022-10-13 RX ORDER — LOSARTAN POTASSIUM 100 MG/1
100 TABLET, FILM COATED ORAL EVERY 24 HOURS
Refills: 0 | Status: DISCONTINUED | OUTPATIENT
Start: 2022-10-14 | End: 2022-10-14

## 2022-10-13 RX ORDER — SODIUM CHLORIDE 9 MG/ML
1000 INJECTION INTRAMUSCULAR; INTRAVENOUS; SUBCUTANEOUS ONCE
Refills: 0 | Status: COMPLETED | OUTPATIENT
Start: 2022-10-13 | End: 2022-10-13

## 2022-10-13 RX ORDER — AZITHROMYCIN 500 MG/1
TABLET, FILM COATED ORAL
Refills: 0 | Status: DISCONTINUED | OUTPATIENT
Start: 2022-10-13 | End: 2022-10-13

## 2022-10-13 RX ORDER — ACETAMINOPHEN 500 MG
650 TABLET ORAL EVERY 6 HOURS
Refills: 0 | Status: DISCONTINUED | OUTPATIENT
Start: 2022-10-13 | End: 2022-10-14

## 2022-10-13 RX ORDER — ENOXAPARIN SODIUM 100 MG/ML
40 INJECTION SUBCUTANEOUS EVERY 24 HOURS
Refills: 0 | Status: DISCONTINUED | OUTPATIENT
Start: 2022-10-14 | End: 2022-10-14

## 2022-10-13 RX ORDER — SERTRALINE 25 MG/1
1 TABLET, FILM COATED ORAL
Qty: 0 | Refills: 0 | DISCHARGE

## 2022-10-13 RX ORDER — LOSARTAN POTASSIUM 100 MG/1
100 TABLET, FILM COATED ORAL DAILY
Refills: 0 | Status: DISCONTINUED | OUTPATIENT
Start: 2022-10-13 | End: 2022-10-13

## 2022-10-13 RX ORDER — AZITHROMYCIN 500 MG/1
500 TABLET, FILM COATED ORAL EVERY 24 HOURS
Refills: 0 | Status: DISCONTINUED | OUTPATIENT
Start: 2022-10-13 | End: 2022-10-14

## 2022-10-13 RX ORDER — SODIUM CHLORIDE 9 MG/ML
2500 INJECTION INTRAMUSCULAR; INTRAVENOUS; SUBCUTANEOUS ONCE
Refills: 0 | Status: COMPLETED | OUTPATIENT
Start: 2022-10-13 | End: 2022-10-13

## 2022-10-13 RX ORDER — AMPICILLIN SODIUM AND SULBACTAM SODIUM 250; 125 MG/ML; MG/ML
INJECTION, POWDER, FOR SUSPENSION INTRAMUSCULAR; INTRAVENOUS
Refills: 0 | Status: DISCONTINUED | OUTPATIENT
Start: 2022-10-13 | End: 2022-10-14

## 2022-10-13 RX ORDER — AMPICILLIN SODIUM AND SULBACTAM SODIUM 250; 125 MG/ML; MG/ML
3 INJECTION, POWDER, FOR SUSPENSION INTRAMUSCULAR; INTRAVENOUS EVERY 6 HOURS
Refills: 0 | Status: DISCONTINUED | OUTPATIENT
Start: 2022-10-14 | End: 2022-10-14

## 2022-10-13 RX ADMIN — SODIUM CHLORIDE 1000 MILLILITER(S): 9 INJECTION INTRAMUSCULAR; INTRAVENOUS; SUBCUTANEOUS at 10:40

## 2022-10-13 RX ADMIN — Medication 15 MILLIGRAM(S): at 10:42

## 2022-10-13 RX ADMIN — AMPICILLIN SODIUM AND SULBACTAM SODIUM 200 GRAM(S): 250; 125 INJECTION, POWDER, FOR SUSPENSION INTRAMUSCULAR; INTRAVENOUS at 09:39

## 2022-10-13 RX ADMIN — SODIUM CHLORIDE 2500 MILLILITER(S): 9 INJECTION INTRAMUSCULAR; INTRAVENOUS; SUBCUTANEOUS at 10:41

## 2022-10-13 RX ADMIN — Medication 15 MILLIGRAM(S): at 09:21

## 2022-10-13 RX ADMIN — AZITHROMYCIN 255 MILLIGRAM(S): 500 TABLET, FILM COATED ORAL at 18:59

## 2022-10-13 RX ADMIN — OXYCODONE AND ACETAMINOPHEN 1 TABLET(S): 5; 325 TABLET ORAL at 10:40

## 2022-10-13 RX ADMIN — OXYCODONE AND ACETAMINOPHEN 1 TABLET(S): 5; 325 TABLET ORAL at 09:21

## 2022-10-13 RX ADMIN — SODIUM CHLORIDE 1000 MILLILITER(S): 9 INJECTION INTRAMUSCULAR; INTRAVENOUS; SUBCUTANEOUS at 09:21

## 2022-10-13 RX ADMIN — AMPICILLIN SODIUM AND SULBACTAM SODIUM 200 GRAM(S): 250; 125 INJECTION, POWDER, FOR SUSPENSION INTRAMUSCULAR; INTRAVENOUS at 18:16

## 2022-10-13 NOTE — ED PROVIDER NOTE - NS ED ATTENDING STATEMENT MOD
This was a shared visit with the LOW. I reviewed and verified the documentation and independently performed the documented:

## 2022-10-13 NOTE — ED ADULT TRIAGE NOTE - CHIEF COMPLAINT QUOTE
Pt presents to ed with worsening cellulitis/swelling to left forearm x 2 days, from a cat scratch. area is red/swollen/warm to touch Pt presents to ed with worsening cellulitis/swelling to left forearm x 2 days, from a cat scratch. area is red/swollen/warm to touch. pmh of hiv

## 2022-10-13 NOTE — ED PROVIDER NOTE - NS ED ROS FT
Constitutional:  no fever, no chills  Eyes:  no discharge, no irritations, no pain, no redness, visual changes  Ears:  no ear pain, no ear drainage,  no hearing problems  Nose:  no nasal congestion, no nasal drainage  Mouth/Throat:  no hoarseness and no throat pain  Neck:  no stiffness, no pain, no lumps, no swollen glands  Cardiac:  no chest pain, no edema  Respiratory:  no cough, no shortness of breath  GI: no abdominal pain, no bloating, no constipation, no diarrhea, no nausea, no vomiting  :  no dysuria, no urinary frequency, no hematuria, no discharge  MSK:  no back pain, L lower arm pain and swelling, no weakness  NEURO:  no headache, no weakness, no numbness  Skin:  no lesions, no pruritis, no jaundice, no bruising, L lower arm redness and swelling  Psych:  no known mental health issues  Endocrine:  no diabetes, no thyroid issues

## 2022-10-13 NOTE — H&P ADULT - PROBLEM SELECTOR PLAN 1
Pt meets 2/4 criteria[HR 98, WBC 12.97] and source of infection coming from left arm/forearm. Lactate 1.7. In ED, given 3.5L bolus NS and Unasyn 3g IV.     -Monitor vitals  - c/w ampicillin 3g q6h for 7 days  -started azithromycin 500mg q24h   -F/u blood cx   -Trend cbc, ESR, CRP to monitor infection Pt meets 2/4 criteria[HR 98, WBC 12.97] and source of infection coming from left arm/forearm. Lactate 1.7. In ED, given 3.5L bolus NS and Unasyn 3g IV.     -Monitor vitals  - c/w Unasyn 3g q6h  -started azithromycin 500mg q24h   -F/u blood cx   -Trend cbc, ESR, CRP to monitor infection Pt meets 2/4 criteria[HR 98, WBC 12.97] and source of infection coming from left arm/forearm from cat sctrach. Lactate 1.7. In ED, given 3.5L bolus NS and Unasyn 3g IV.     -Monitor vitals  - c/w Unasyn 3g q6h  -started azithromycin 500mg q24h   -F/u blood cx   -Trend cbc, ESR, CRP to monitor infection

## 2022-10-13 NOTE — PATIENT PROFILE ADULT - FALL HARM RISK - UNIVERSAL INTERVENTIONS
Bed in lowest position, wheels locked, appropriate side rails in place/Call bell, personal items and telephone in reach/Instruct patient to call for assistance before getting out of bed or chair/Non-slip footwear when patient is out of bed/[place] to call system/Physically safe environment - no spills, clutter or unnecessary equipment/Purposeful Proactive Rounding/Room/bathroom lighting operational, light cord in reach

## 2022-10-13 NOTE — H&P ADULT - NSHPPHYSICALEXAM_GEN_ALL_CORE
Constitutional:  Well appearing, awake, alert, oriented to person, place, time/situation and in no apparent distress  	Head:  NC/AT, symmetric, neck supple  	ENMT: Airway patent, nasal mucosa clear, mouth with normal mucosa, throat has no vesicles, no oropharyngeal exudates and uvula is midline  	Eyes:  Clear bilaterally, pupils equal, round and reactive to light  	Cardiac:  Normal rate, regular rhythm. Heart sounds S1,S2.  No murmurs, rubs or gallops.  No JVD.  	Respiratory:  Breath sounds clear and equal bilaterally  	GI:   Abd soft, non-distended, non-tender, no guarding  	:  No discharge or lesions  	MSK: Marked erythema and edema of L lower arm, + ttp, no crepitus, FROM of wrist, elbow and shoulder.  +5 hand grasp bilat, distal sensation r/m/u intact.    	Neuro:  Alert and oriented, no focal deficits, no motor or sensory deficits  	Skin:  Skin normal color for race, warm, dry.  Marked, 16x16 cm, well demarcated erythema to L upper extremity, not to touch, no fluctuance, + induration to dorsal forearm, multiple linear streaking to L axilla, scabbed superficial abrasion to forearm  	Psych:  Normal mood and affect, no apparent risk to self or others.  Heme:  + L axillary adenopathy Constitutional:  Well appearing, no apparent distress, AAO x3   Head:  NC/AT, symmetric, neck supple  ENMT: Airway patent, nasal mucosa clear, mouth with normal mucosa, throat has no vesicles, no oropharyngeal exudates and uvula is midline  Eyes:  Clear bilaterally, pupils equal, round and reactive to light  Cardiac:  Normal rate, regular rhythm. Heart sounds S1,S2.  No murmurs, rubs or gallops.  No JVD.  Respiratory:  Breath sounds clear and equal bilaterally, No rales, crackles or wheezing   GI:   Abd soft, non-distended, non-tender, no guarding, bowel sounds present   :  No discharge or lesions  MSK: Marked erythema and edema of L lower arm, + ttp, no crepitus, FROM of wrist, elbow and shoulder.  +5 hand grasp bilat, distal sensation r/m/u intact.    Neuro:  Alert and oriented, no focal deficits, no motor or sensory deficits  Skin:  Skin normal color for race, warm, dry.  Marked, 16x16 cm, well demarcated erythema to L upper extremity, not to touch, no fluctuance, + induration to dorsal forearm, multiple linear streaking to L axilla, scabbed superficial abrasion to forearm  Psych:  Normal mood and affect, no apparent risk to self or others.  Heme:  + L axillary adenopathy

## 2022-10-13 NOTE — ED ADULT NURSE NOTE - CHIEF COMPLAINT QUOTE
same name as above
Pt presents to ed with worsening cellulitis/swelling to left forearm x 2 days, from a cat scratch. area is red/swollen/warm to touch. pmh of hiv

## 2022-10-13 NOTE — ED PROVIDER NOTE - CLINICAL SUMMARY MEDICAL DECISION MAKING FREE TEXT BOX
47-year-old male presents to the ED with cellulitis to left arm secondary to cat bite 2 days ago.  Patient presents tachycardic in the 110's, normotensive.  Normotensive.  No acute distress.  WBC 12, crp > 120.  Will admit for IVBs. 47-year-old male presents to the ED with cellulitis with lymphangitis to left arm secondary to cat bite 2 days ago.  Patient presents tachycardic in the 110's, normotensive.  Normotensive.  No acute distress.  WBC 12, crp > 120.  Will admit for IVBs. 47-year-old male presents to the ED with cellulitis with lymphangitis to left arm secondary to cat bite 2 days ago.  Patient presents tachycardic in the 110's, normotensive.  Normotensive.  No acute distress.  WBC 12, crp > 120.  Will admit for IV abx.    Pt discussed Dr. Issa Barton via [facility].  Pt accepted for admission to [facility] medicine.  Pt is amenable to admission.

## 2022-10-13 NOTE — H&P ADULT - HISTORY OF PRESENT ILLNESS
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 Pt reports progressive worsening of symptoms; burning pain, swelling, and redness. Denies cleansing the scratch or taking any pain medications. Reports the pain as 15/10 before coming to the ED, and currently as 10/10 after receiving pain medication in the ED. Pt also endorses subjective fever. Pt denies trauma/falls, headache, visual changes, sore throat, chest pain, palpitations, cough, SOB, abd pain, n/v/d, dysuria, hematuria, weakness, dizziness, numbness, lower extremity swelling, rash, sick contacts, recent hospitalizations, recent travels.                 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 Pt reports progressive worsening of symptoms; burning pain, swelling, and redness. Denies cleansing the scratch or taking any pain medications. Reports the pain as 15/10 before coming to the ED, and currently as 10/10 after receiving pain medication in the ED. Pt also endorses subjective fever. Pt denies trauma/falls, headache, visual changes, sore throat, chest pain, palpitations, cough, SOB, abd pain, n/v/d, dysuria, hematuria, weakness, dizziness, numbness, lower extremity swelling, rash, sick contacts, recent hospitalizations, recent travels.    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 Pt reports progressive worsening of symptoms; burning pain, swelling, and redness. Denies cleansing the scratch or taking any pain medications. Reports the pain as 15/10 before coming to the ED, and currently as 10/10 after receiving pain medication in the ED. Pt also endorses subjective fever. Pt on Triumeq, per pt undetectable VL. Pt follows Dr. Harmon outpt. Pt denies trauma/falls, headache, visual changes, sore throat, chest pain, palpitations, cough, SOB, abd pain, n/v/d, dysuria, hematuria, weakness, dizziness, numbness, lower extremity swelling, rash, sick contacts, recent hospitalizations, recent travels.     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 Pt reports progressive worsening of symptoms; burning pain, swelling, and redness. Denies cleansing the scratch or taking any pain medications. Reports the pain as 15/10 before coming to the ED, and currently as 10/10 after receiving pain medication in the ED. Pt also endorses subjective fever. Pt on Triumeq, per pt undetectable VL. Pt follows Dr. Eden malikpt. Pt denies trauma/falls, headache, visual changes, sore throat, chest pain, palpitations, cough, SOB, abd pain, n/v/d, dysuria, hematuria, weakness, dizziness, numbness, lower extremity swelling, rash, sick contacts, recent hospitalizations, recent travels.     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 Pt reports progressive worsening of symptoms; burning pain, swelling, and redness. Denies cleansing the scratch or taking any pain medications. Reports the pain as 15/10 before coming to the ED, and currently as 10/10 after receiving pain medication in the ED. Pt also endorses subjective fever. Pt on Triumeq, per pt undetectable VL. Pt follows Dr. Eden malikpt. Pt denies trauma/falls, headache, visual changes, sore throat, chest pain, palpitations, cough, SOB, abd pain, n/v/d, dysuria, hematuria, weakness, dizziness, numbness, lower extremity swelling, rash, sick contacts, recent hospitalizations, recent travels.     ED Course  VS: /78, HR 98, Temp 97.5, RR18, O2 99% Room air   Labs: WBC 12.9, CRP >120, Lactate 1.7, ESR pending, Blood cx pending,   Meds: NS bolus 3.5L , Toradel 15mg, percocet 5/325mg  EKG: sinus tachy,   Imaging: none   Consults: none

## 2022-10-13 NOTE — H&P ADULT - NSHPSOCIALHISTORY_GEN_ALL_CORE
, lives alone in an apartment. Significant other person lives in [place].   Self-employed  Denies tobacco use, occasional alcohol use  History of smoking crystal meth, last used 3-5 years ago.   Denies current use of any recreational drugs. , lives alone in an apartment. Significant other person lives in [place].   Self-employed  Denies tobacco use, occasional alcohol use  History of smoking crystal meth, last used 3-5 years ago.   Denies current use of any recreational drugs

## 2022-10-13 NOTE — ED PROVIDER NOTE - OBJECTIVE STATEMENT
47-year-old male with past medical history of hypertension HIV on [place], reports undetectable VL, TBI in the setting of unintentional OD 4 years ago, depression presents to the emergency department complaining of left lower arm redness and swelling status post a cat bite from his personal cat 2 days ago.  He endorses subjective fever.  Patient denies illicit drug use.    Pt denies trauma/falls, neck or back pain, headache, visual changes, sore throat, chest pain, palpitations, cough, SOB, abd pain, n/v/d, dysuria, hematuria, weakness, dizziness, numbness, lower extremity swelling, rash, sick contacts, recent hospitalizations, recent travels. 47-year-old male with past medical history of hypertension HIV on [place], reports undetectable VL, TBI in the setting of unintentional OD 4 years ago, depression presents to the emergency department complaining of left lower arm redness and swelling status post a cat bite from his personal cat 2 days ago.  He endorses subjective fever.  Patient denies illicit drug use.    Pt denies trauma/falls, neck or back pain, headache, visual changes, sore throat, chest pain, palpitations, cough, SOB, abd pain, n/v/d, dysuria, hematuria, weakness, dizziness, numbness, lower extremity swelling, rash, sick contacts, recent hospitalizations, recent travels.    meds:  Triumeq, Losartan,

## 2022-10-13 NOTE — H&P ADULT - NSICDXFAMILYHX_GEN_ALL_CORE_FT
FAMILY HISTORY:  Father  Still living? No  FH: heart failure, Age at diagnosis: Age Unknown  FH: myocardial infarction, Age at diagnosis: Age Unknown    Mother  Still living? No  Family history of hypertension in mother, Age at diagnosis: Age Unknown  FH: heart failure, Age at diagnosis: Age Unknown

## 2022-10-13 NOTE — H&P ADULT - ATTENDING COMMENTS
47M HIV who presents with Sepsis 2/2 Cellulitis and concern for bartonella infection. Improvement on Unasyn.     Labs and imaging reviewed    Problem List  #Sepsis POA now resolved  #Severe Non Purulent Cellulitis - Cont Unasyn, switch to PO in AM  #Bartonella Infection - Azithro  #HIV Cont Biktarvy    Rest of problems as above    Dispo: Anticipate home tomorrow if cont improvement discussed with patient and he is in agreement.

## 2022-10-13 NOTE — H&P ADULT - PROBLEM SELECTOR PLAN 2
Left arm edematous, erythematous, and painful. Erythema marked. Negative for compartment syndrome as neuromuscular intact and +2 radial pulses of LUE.     -Monitor progression soft tissue erythema.   -started ampicillin 3g q6h for 7 days and azithromycin 500mg q24  -F/u blood cx  -Trend cbc, ESR, CRP to monitor infection Left arm edematous, erythematous, and painful. Erythema marked. Concern for bartonella infection. Negative for compartment syndrome as neuromuscular intact and +2 radial pulses of LUE. .    -Monitor progression soft tissue erythema.   -started Unasyn 3g q6h for 7 days and azithromycin 500mg q24  -F/u blood cx  -Trend cbc, ESR, CRP to monitor infection Left arm edematous, erythematous, and painful. Erythema marked. Concern for bartonella infection. Negative for compartment syndrome as neuromuscular intact and +2 radial pulses of LUE. .    -Monitor progression soft tissue erythema.   -PRN Tylenol 650 q6h for mild/moderate pain   -started Unasyn 3g q6h for 7 days and azithromycin 500mg q24  -F/u blood cx  -Trend cbc, ESR, CRP to monitor infection Left arm edematous, erythematous, and painful. Erythema marked. Concern for bartonella infection. Negative for compartment syndrome as neuromuscular intact and +2 radial pulses of LUE. .    -Monitor progression soft tissue erythema.   -PRN Tylenol 650 q6h for mild/moderate pain   -started Unasyn 3g q6h for 7 days and azithromycin 500mg q24  -F/u blood cx

## 2022-10-13 NOTE — H&P ADULT - PROBLEM SELECTOR PLAN 5
Pt on home meds, zoloft 25mg qd  -c/w with home meds Pt with hx of HTH, taking Losartan 100mg qd  -c/w with home meds, losartan 100mg qd

## 2022-10-13 NOTE — H&P ADULT - ASSESSMENT
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 Vitals and labs indicative of sepsis. Pt admitted for treatment of sepsis 2/2 cellulitis 2/2 cat scratch.  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 Vitals and labs indicative of sepsis. Pt admitted for treatment of sepsis 2/2 cellulitis 2/2 cat scratch. Concern for bartonella infection. 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 Vitals and labs indicative of sepsis. Pt admitted for treatment of sepsis 2/2 cellulitis 2/2 cat scratch. Concern for bartonella infection.

## 2022-10-13 NOTE — H&P ADULT - NSICDXPASTMEDICALHX_GEN_ALL_CORE_FT
PAST MEDICAL HISTORY:  Cervical nerve root compression     HIV disease     HTN (hypertension)     Lumbar nerve root compression     TBI (traumatic brain injury)      PAST MEDICAL HISTORY:  Cervical nerve root compression     History of depression     HIV disease     HTN (hypertension)     Lumbar nerve root compression     TBI (traumatic brain injury)

## 2022-10-13 NOTE — ED PROVIDER NOTE - NSICDXPASTMEDICALHX_GEN_ALL_CORE_FT
PAST MEDICAL HISTORY:  HIV disease     HTN (hypertension)     TBI (traumatic brain injury)      <-- Click to add NO significant Past Surgical History

## 2022-10-13 NOTE — H&P ADULT - NSHPREVIEWOFSYSTEMS_GEN_ALL_CORE
REVIEW OF SYSTEMS:    CONSTITUTIONAL: Patient denies weakness, fevers or chills  EYES/ENT: Patient denies visual changes;  denies vertigo or throat pain   NECK: Patient denies pain or stiffness  RESPIRATORY: Patient denies cough, wheezing, hemoptysis; denies shortness of breath  CARDIOVASCULAR: Patient denies chest pain or palpitations  GASTROINTESTINAL: Patient denies abdominal or epigastric pain, nausea, vomiting, or hematemesis, diarrhea or constipation, melena or hematochezia.  GENITOURINARY: Patient denies dysuria, frequency or hematuria  NEUROLOGICAL: Patient denies numbness or weakness  SKIN: Patient denies itching, rashes, positive for burning, erythema, and edema of L arm.   All other review of systems is negative unless indicated above.

## 2022-10-13 NOTE — ED PROVIDER NOTE - PHYSICAL EXAMINATION
Constitutional:  Well appearing, awake, alert, oriented to person, place, time/situation and in no apparent distress  Head:  NC/AT, symmetric, neck supple  ENMT: Airway patent, nasal mucosa clear, mouth with normal mucosa, throat has no vesicles, no oropharyngeal exudates and uvula is midline  Eyes:  Clear bilaterally, pupils equal, round and reactive to light  Cardiac:  Normal rate, regular rhythm. Heart sounds S1,S2.  No murmurs, rubs or gallops.  No JVD.  Respiratory:  Breath sounds clear and equal bilaterally  GI:   Abd soft, non-distended, non-tender, no guarding  :  No discharge or lesions  MSK: Marked erythema and edema of L lower arm, + ttp, no crepitus, FROM of wrist, elbow and shoulder.  +5 hand grasp bilat, distal sensation r/m/u intact.    Neuro:  Alert and oriented, no focal deficits, no motor or sensory deficits  Skin:  Skin normal color for race, warm, dry.  Marked, well demarcated erythema to L upper extremity, not to touch, no fluctuance, + induration to dorsal forearm, superficial abrasion  Psych:  Normal mood and affect, no apparent risk to self or others.  Heme:  + L axillary adenopathy Constitutional:  Well appearing, awake, alert, oriented to person, place, time/situation and in no apparent distress  Head:  NC/AT, symmetric, neck supple  ENMT: Airway patent, nasal mucosa clear, mouth with normal mucosa, throat has no vesicles, no oropharyngeal exudates and uvula is midline  Eyes:  Clear bilaterally, pupils equal, round and reactive to light  Cardiac:  Normal rate, regular rhythm. Heart sounds S1,S2.  No murmurs, rubs or gallops.  No JVD.  Respiratory:  Breath sounds clear and equal bilaterally  GI:   Abd soft, non-distended, non-tender, no guarding  :  No discharge or lesions  MSK: Marked erythema and edema of L lower arm, + ttp, no crepitus, FROM of wrist, elbow and shoulder.  +5 hand grasp bilat, distal sensation r/m/u intact.    Neuro:  Alert and oriented, no focal deficits, no motor or sensory deficits  Skin:  Skin normal color for race, warm, dry.  Marked, well demarcated erythema to L upper extremity, not to touch, no fluctuance, + induration to dorsal forearm, multiple linear streaking to L axilla, scabbed superficial abrasion to forearm  Psych:  Normal mood and affect, no apparent risk to self or others.  Heme:  + L axillary adenopathy Constitutional:  Well appearing, awake, alert, oriented to person, place, time/situation and in no apparent distress  Head:  NC/AT, symmetric, neck supple  ENMT: Airway patent, nasal mucosa clear, mouth with normal mucosa, throat has no vesicles, no oropharyngeal exudates and uvula is midline  Eyes:  Clear bilaterally, pupils equal, round and reactive to light  Cardiac:  Normal rate, regular rhythm. Heart sounds S1,S2.  No murmurs, rubs or gallops.  No JVD.  Respiratory:  Breath sounds clear and equal bilaterally  GI:   Abd soft, non-distended, non-tender, no guarding  :  No discharge or lesions  MSK: Marked erythema and edema of L lower arm, + ttp, no crepitus, FROM of wrist, elbow and shoulder.  +5 hand grasp bilat, distal sensation r/m/u intact.    Neuro:  Alert and oriented, no focal deficits, no motor or sensory deficits  Skin:  Skin normal color for race, warm, dry.  Marked, 16x16 cm, well demarcated erythema to L upper extremity, not to touch, no fluctuance, + induration to dorsal forearm, multiple linear streaking to L axilla, scabbed superficial abrasion to forearm  Psych:  Normal mood and affect, no apparent risk to self or others.  Heme:  + L axillary adenopathy

## 2022-10-13 NOTE — H&P ADULT - PROBLEM SELECTOR PLAN 3
Hx of HIV diease, per pt VL unremarkable. PT following Dr. GUTIERRES outpt. On home meds of Triumeq  - c/w with home meds, Triumeq Hx of HIV diease, per pt VL unremarkable. Per HIE, on 7/27/22 VL 2.08 and CD4 927. PT following Dr. Lucy malikpt. On home med of Triumeq.   - c/w with home meds, Triumeq concern d/t inciting event above being a scratch from his cat  - mgmt as above

## 2022-10-13 NOTE — H&P ADULT - NSHPLABSRESULTS_GEN_ALL_CORE
LABS:                         14.9   12.97 )-----------( 195      ( 13 Oct 2022 09:10 )             43.3     10-13    133  |  100  |  13  ----------------------------<  115<H>  4.1   |  28  |  1.17    Ca    9.2      13 Oct 2022 09:10    TPro  8.2  /  Alb  3.7  /  TBili  0.7  /  DBili  x   /  AST  13<L>  /  ALT  34  /  AlkPhos  84  10-13    PT/INR - ( 13 Oct 2022 09:10 )   PT: 13.1 sec;   INR: 1.13          PTT - ( 13 Oct 2022 09:10 )  PTT:28.8 sec          Lactate, Blood: 1.7 mmoL/L (10-13 @ 09:10)      RADIOLOGY, EKG & ADDITIONAL TESTS:

## 2022-10-13 NOTE — H&P ADULT - PROBLEM SELECTOR PLAN 4
Pt with hx of HTH, taking Losartan 100mg qd  -c/w with home meds, losartan 100mg qd Hx of HIV diease, per pt VL unremarkable. Per HIE, on 7/27/22 VL 2.08 and CD4 927. PT following Dr. Lucy malikpt. On home med of Triumeq.   - c/w with home meds, Triumeq

## 2022-10-14 ENCOUNTER — TRANSCRIPTION ENCOUNTER (OUTPATIENT)
Age: 47
End: 2022-10-14

## 2022-10-14 VITALS
TEMPERATURE: 98 F | SYSTOLIC BLOOD PRESSURE: 124 MMHG | DIASTOLIC BLOOD PRESSURE: 79 MMHG | RESPIRATION RATE: 17 BRPM | HEART RATE: 88 BPM | OXYGEN SATURATION: 99 %

## 2022-10-14 LAB
4/8 RATIO: 1.37 RATIO — SIGNIFICANT CHANGE UP (ref 0.9–3.6)
ABS CD8: 728 /UL — SIGNIFICANT CHANGE UP (ref 142–740)
ANION GAP SERPL CALC-SCNC: 11 MMOL/L — SIGNIFICANT CHANGE UP (ref 5–17)
BASOPHILS # BLD AUTO: 0.01 K/UL — SIGNIFICANT CHANGE UP (ref 0–0.2)
BASOPHILS NFR BLD AUTO: 0.1 % — SIGNIFICANT CHANGE UP (ref 0–2)
BUN SERPL-MCNC: 12 MG/DL — SIGNIFICANT CHANGE UP (ref 7–23)
CALCIUM SERPL-MCNC: 9 MG/DL — SIGNIFICANT CHANGE UP (ref 8.4–10.5)
CD3 BLASTS SPEC-ACNC: 1733 /UL — SIGNIFICANT CHANGE UP (ref 672–1870)
CD3 BLASTS SPEC-ACNC: 73 % — SIGNIFICANT CHANGE UP (ref 59–83)
CD4 %: 42 % — SIGNIFICANT CHANGE UP (ref 30–62)
CD8 %: 31 % — SIGNIFICANT CHANGE UP (ref 12–36)
CHLORIDE SERPL-SCNC: 105 MMOL/L — SIGNIFICANT CHANGE UP (ref 96–108)
CO2 SERPL-SCNC: 23 MMOL/L — SIGNIFICANT CHANGE UP (ref 22–31)
CREAT SERPL-MCNC: 1.08 MG/DL — SIGNIFICANT CHANGE UP (ref 0.5–1.3)
CRP SERPL-MCNC: 93.2 MG/L — HIGH (ref 0–4)
EGFR: 85 ML/MIN/1.73M2 — SIGNIFICANT CHANGE UP
EOSINOPHIL # BLD AUTO: 0.23 K/UL — SIGNIFICANT CHANGE UP (ref 0–0.5)
EOSINOPHIL NFR BLD AUTO: 2.2 % — SIGNIFICANT CHANGE UP (ref 0–6)
ERYTHROCYTE [SEDIMENTATION RATE] IN BLOOD: 40 MM/HR — HIGH (ref 0–15)
GLUCOSE SERPL-MCNC: 93 MG/DL — SIGNIFICANT CHANGE UP (ref 70–99)
HCT VFR BLD CALC: 39.5 % — SIGNIFICANT CHANGE UP (ref 39–50)
HGB BLD-MCNC: 13.2 G/DL — SIGNIFICANT CHANGE UP (ref 13–17)
HIV 1+2 AB+HIV1 P24 AG SERPL QL IA: REACTIVE
IMM GRANULOCYTES NFR BLD AUTO: 0.3 % — SIGNIFICANT CHANGE UP (ref 0–0.9)
LYMPHOCYTES # BLD AUTO: 2.78 K/UL — SIGNIFICANT CHANGE UP (ref 1–3.3)
LYMPHOCYTES # BLD AUTO: 26.3 % — SIGNIFICANT CHANGE UP (ref 13–44)
MAGNESIUM SERPL-MCNC: 2.6 MG/DL — SIGNIFICANT CHANGE UP (ref 1.6–2.6)
MCHC RBC-ENTMCNC: 28.9 PG — SIGNIFICANT CHANGE UP (ref 27–34)
MCHC RBC-ENTMCNC: 33.4 GM/DL — SIGNIFICANT CHANGE UP (ref 32–36)
MCV RBC AUTO: 86.6 FL — SIGNIFICANT CHANGE UP (ref 80–100)
MONOCYTES # BLD AUTO: 0.79 K/UL — SIGNIFICANT CHANGE UP (ref 0–0.9)
MONOCYTES NFR BLD AUTO: 7.5 % — SIGNIFICANT CHANGE UP (ref 2–14)
NEUTROPHILS # BLD AUTO: 6.75 K/UL — SIGNIFICANT CHANGE UP (ref 1.8–7.4)
NEUTROPHILS NFR BLD AUTO: 63.6 % — SIGNIFICANT CHANGE UP (ref 43–77)
NRBC # BLD: 0 /100 WBCS — SIGNIFICANT CHANGE UP (ref 0–0)
PHOSPHATE SERPL-MCNC: 2.9 MG/DL — SIGNIFICANT CHANGE UP (ref 2.5–4.5)
PLATELET # BLD AUTO: 197 K/UL — SIGNIFICANT CHANGE UP (ref 150–400)
POTASSIUM SERPL-MCNC: 4.2 MMOL/L — SIGNIFICANT CHANGE UP (ref 3.5–5.3)
POTASSIUM SERPL-SCNC: 4.2 MMOL/L — SIGNIFICANT CHANGE UP (ref 3.5–5.3)
RBC # BLD: 4.56 M/UL — SIGNIFICANT CHANGE UP (ref 4.2–5.8)
RBC # FLD: 14.6 % — HIGH (ref 10.3–14.5)
SODIUM SERPL-SCNC: 139 MMOL/L — SIGNIFICANT CHANGE UP (ref 135–145)
T-CELL CD4 SUBSET PNL BLD: 996 /UL — SIGNIFICANT CHANGE UP (ref 489–1457)
WBC # BLD: 10.59 K/UL — HIGH (ref 3.8–10.5)
WBC # FLD AUTO: 10.59 K/UL — HIGH (ref 3.8–10.5)

## 2022-10-14 PROCEDURE — 85025 COMPLETE CBC W/AUTO DIFF WBC: CPT

## 2022-10-14 PROCEDURE — 87389 HIV-1 AG W/HIV-1&-2 AB AG IA: CPT

## 2022-10-14 PROCEDURE — 87635 SARS-COV-2 COVID-19 AMP PRB: CPT

## 2022-10-14 PROCEDURE — 86359 T CELLS TOTAL COUNT: CPT

## 2022-10-14 PROCEDURE — 85652 RBC SED RATE AUTOMATED: CPT

## 2022-10-14 PROCEDURE — 80053 COMPREHEN METABOLIC PANEL: CPT

## 2022-10-14 PROCEDURE — 85610 PROTHROMBIN TIME: CPT

## 2022-10-14 PROCEDURE — 83735 ASSAY OF MAGNESIUM: CPT

## 2022-10-14 PROCEDURE — 86140 C-REACTIVE PROTEIN: CPT

## 2022-10-14 PROCEDURE — 85730 THROMBOPLASTIN TIME PARTIAL: CPT

## 2022-10-14 PROCEDURE — 86701 HIV-1ANTIBODY: CPT

## 2022-10-14 PROCEDURE — 86360 T CELL ABSOLUTE COUNT/RATIO: CPT

## 2022-10-14 PROCEDURE — 96374 THER/PROPH/DIAG INJ IV PUSH: CPT

## 2022-10-14 PROCEDURE — 87040 BLOOD CULTURE FOR BACTERIA: CPT

## 2022-10-14 PROCEDURE — 36415 COLL VENOUS BLD VENIPUNCTURE: CPT

## 2022-10-14 PROCEDURE — 80048 BASIC METABOLIC PNL TOTAL CA: CPT

## 2022-10-14 PROCEDURE — 84100 ASSAY OF PHOSPHORUS: CPT

## 2022-10-14 PROCEDURE — 96361 HYDRATE IV INFUSION ADD-ON: CPT

## 2022-10-14 PROCEDURE — 83605 ASSAY OF LACTIC ACID: CPT

## 2022-10-14 PROCEDURE — 99239 HOSP IP/OBS DSCHRG MGMT >30: CPT | Mod: GC

## 2022-10-14 PROCEDURE — 86702 HIV-2 ANTIBODY: CPT

## 2022-10-14 PROCEDURE — 96375 TX/PRO/DX INJ NEW DRUG ADDON: CPT

## 2022-10-14 PROCEDURE — 99285 EMERGENCY DEPT VISIT HI MDM: CPT | Mod: 25

## 2022-10-14 RX ORDER — AZITHROMYCIN 500 MG/1
250 TABLET, FILM COATED ORAL EVERY 24 HOURS
Refills: 0 | Status: DISCONTINUED | OUTPATIENT
Start: 2022-10-14 | End: 2022-10-14

## 2022-10-14 RX ORDER — CEPHALEXIN 500 MG
1 CAPSULE ORAL
Qty: 20 | Refills: 0
Start: 2022-10-14 | End: 2022-10-18

## 2022-10-14 RX ORDER — ACETAMINOPHEN 500 MG
1000 TABLET ORAL ONCE
Refills: 0 | Status: COMPLETED | OUTPATIENT
Start: 2022-10-14 | End: 2022-10-14

## 2022-10-14 RX ORDER — AZITHROMYCIN 500 MG/1
1 TABLET, FILM COATED ORAL
Qty: 4 | Refills: 0
Start: 2022-10-14 | End: 2022-10-17

## 2022-10-14 RX ADMIN — ENOXAPARIN SODIUM 40 MILLIGRAM(S): 100 INJECTION SUBCUTANEOUS at 10:25

## 2022-10-14 RX ADMIN — LOSARTAN POTASSIUM 100 MILLIGRAM(S): 100 TABLET, FILM COATED ORAL at 07:17

## 2022-10-14 RX ADMIN — AMPICILLIN SODIUM AND SULBACTAM SODIUM 200 GRAM(S): 250; 125 INJECTION, POWDER, FOR SUSPENSION INTRAMUSCULAR; INTRAVENOUS at 00:04

## 2022-10-14 RX ADMIN — Medication 1000 MILLIGRAM(S): at 07:52

## 2022-10-14 RX ADMIN — AMPICILLIN SODIUM AND SULBACTAM SODIUM 200 GRAM(S): 250; 125 INJECTION, POWDER, FOR SUSPENSION INTRAMUSCULAR; INTRAVENOUS at 11:19

## 2022-10-14 RX ADMIN — Medication 400 MILLIGRAM(S): at 07:22

## 2022-10-14 RX ADMIN — SERTRALINE 25 MILLIGRAM(S): 25 TABLET, FILM COATED ORAL at 16:27

## 2022-10-14 RX ADMIN — AMPICILLIN SODIUM AND SULBACTAM SODIUM 200 GRAM(S): 250; 125 INJECTION, POWDER, FOR SUSPENSION INTRAMUSCULAR; INTRAVENOUS at 07:16

## 2022-10-14 NOTE — PROGRESS NOTE ADULT - ASSESSMENT
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 Vitals and labs indicative of sepsis. Pt admitted for treatment of sepsis 2/2 cellulitis 2/2 cat scratch. Concern for bartonella infection.

## 2022-10-14 NOTE — PROGRESS NOTE ADULT - PROBLEM SELECTOR PLAN 4
Hx of HIV diease, per pt VL unremarkable. Per HIE, on 7/27/22 VL 2.08 and CD4 927. PT following Dr. Lucy malikpt. On home med of Triumeq.   - c/w with home meds, Triumeq

## 2022-10-14 NOTE — PROGRESS NOTE ADULT - PROBLEM SELECTOR PLAN 2
Left arm edematous, erythematous, and painful. Erythema marked. Concern for bartonella infection. Negative for compartment syndrome as neuromuscular intact and +2 radial pulses of LUE. .    -Monitor progression soft tissue erythema.   -PRN Tylenol 650 q6h for mild/moderate pain   -started Unasyn 3g q6h for 7 days and azithromycin 500mg q24  -F/u blood cx

## 2022-10-14 NOTE — DISCHARGE NOTE PROVIDER - NSDCMRMEDTOKEN_GEN_ALL_CORE_FT
losartan 100 mg oral tablet: 1 tab(s) orally once a day  Triumeq oral tablet: 1 tab(s) orally once a day  Zoloft 25 mg oral tablet: 1 tab(s) orally once a day   azithromycin 250 mg oral tablet: 1 tab(s) orally every 24 hours (11/14-11/17)  cephalexin 500 mg oral tablet: 1 tab(s) orally 4 times a day (10/15-10/19)  losartan 100 mg oral tablet: 1 tab(s) orally once a day  Triumeq oral tablet: 1 tab(s) orally once a day  Zoloft 25 mg oral tablet: 1 tab(s) orally once a day

## 2022-10-14 NOTE — DISCHARGE NOTE PROVIDER - HOSPITAL COURSE
#Discharge: do not delete    Patient is 46 yo M with past medical history of HTN, HIV, TBI i/s/o unintentional OD of fentanyl 4 years afo, and depression who presented with acute left forearm pain and swelling after a cat scratch, found to have cellulitis.     Hospital course (by problem):     #Sepsis.   Pt meets 2/4 criteria [HR 98, WBC 12.97] and source of infection coming from left arm/forearm from cat scratch. Lactate 1.7. In ED, given 3.5L bolus NS and Unasyn 3g IV. Also given azithromycin 500mg q24h which was transitioned to azithromycin 250mg PO daily. Blood cultures have shown no growth to date, and inflammatory markers were trending down at the time of discharge.   - start keflex    #Cellulitis.   Left arm edematous, erythematous, and painful. Erythema marked. Concern for bartonella infection i/s/o cat scratch. Negative for compartment syndrome as neuromuscular intact and +2 radial pulses of LUE. Monitor progression soft tissue erythema.   - may use PRN Tylenol 650 q6h for mild/moderate pain   - c/w azithromycin PO Unasyn 3g q6h for 7 days and azithromycin 500mg q24  -F/u blood cx.     Problem/Plan - 3:  ·  Problem: Bartonella infection.   ·  Plan: concern d/t inciting event above being a scratch from his cat  - mgmt as above.     Problem/Plan - 4:  ·  Problem: HIV disease.   ·  Plan: Hx of HIV diease, per pt VL unremarkable. Per HIE, on 7/27/22 VL 2.08 and CD4 927. PT following Dr. Lucy machado. On home med of Triumeq.   - c/w with home meds, Triumeq.     Problem/Plan - 5:  ·  Problem: HTN (hypertension).   ·  Plan: Pt with hx of HTH, taking Losartan 100mg qd  -c/w with home meds, losartan 100mg qd.     Problem/Plan - 6:  ·  Problem: Anxiety and depression.   ·  Plan: Pt on home meds, zoloft 25mg qd  -c/w with home meds.      Patient was discharged to: (home/ROB/acute rehab/hospice, etc, and with what services – home health PT/RN? Home O2?)    New medications:   Changes to old medications:  Medications that were stopped:    Items to follow up as outpatient:    Physical exam at the time of discharge:       #Discharge: do not delete    Patient is 48 yo M with past medical history of HTN, HIV, TBI i/s/o unintentional OD of fentanyl 4 years afo, and depression who presented with acute left forearm pain and swelling after a cat scratch, found to have cellulitis.     Hospital course (by problem):     #Sepsis.   Pt meets 2/4 criteria [HR 98, WBC 12.97] and source of infection coming from left arm/forearm from cat scratch. Lactate 1.7. In ED, given 3.5L bolus NS and Unasyn 3g IV. Also given azithromycin 500mg q24h which was transitioned to azithromycin 250mg PO daily. Blood cultures have shown no growth to date, and inflammatory markers were trending down at the time of discharge.   - start keflex    #Cellulitis.   Left arm edematous, erythematous, and painful. Erythema marked. Concern for bartonella infection i/s/o cat scratch. Negative for compartment syndrome as neuromuscular intact and +2 radial pulses of LUE. Monitor progression soft tissue erythema.   - may use PRN Tylenol 650 q6h for mild/moderate pain   - c/w azithromycin PO Unasyn 3g q6h for 7 days and azithromycin 500mg q24  - f/u blood cx.    #HIV disease.   Hx of HIV diease, per pt VL unremarkable. Per HIE, on 7/27/22 VL 2.08 and CD4 927. PT following Dr. Lucy machado. On home med of Triumeq.   - c/w with home meds, Triumeq.    #HTN (hypertension).   Pt with hx of HTH, taking Losartan 100mg qd  - c/w with home meds, losartan 100mg qd.    #Anxiety and depression.   Pt on home meds, zoloft 25mg qd  - c/w with home meds.      Patient was discharged to: (home/ROB/acute rehab/hospice, etc, and with what services – home health PT/RN? Home O2?)    New medications:   Changes to old medications:  Medications that were stopped:    Items to follow up as outpatient:    Physical exam at the time of discharge:       #Discharge: do not delete    Patient is 48 yo M with past medical history of HTN, HIV, TBI i/s/o unintentional OD of fentanyl 4 years afo, and depression who presented with acute left forearm pain and swelling after a cat scratch, found to have cellulitis.     Hospital course (by problem):     #Sepsis.   Pt meets 2/4 criteria [HR 98, WBC 12.97] and source of infection coming from left arm/forearm from cat scratch. Lactate 1.7. In ED, given 3.5L bolus NS and Unasyn 3g IV. Also given azithromycin 500mg q24h which was transitioned to azithromycin 250mg PO daily. Blood cultures have shown no growth to date, and inflammatory markers were trending down at the time of discharge.   - start keflex    #Cellulitis.   Left arm edematous, erythematous, and painful. Erythema marked. Concern for bartonella infection i/s/o cat scratch. Negative for compartment syndrome as neuromuscular intact and +2 radial pulses of LUE. Monitor progression soft tissue erythema.   - may use PRN Tylenol 650 q6h for mild/moderate pain   - c/w azithromycin PO Unasyn 3g q6h for 7 days and azithromycin 500mg q24  - f/u blood cx.    #HIV disease.   Hx of HIV diease, per pt VL unremarkable. Per HIE, on 7/27/22 VL 2.08 and CD4 927. PT following Dr. Lucy machado. On home med of Triumeq.   - c/w with home meds, Triumeq.    #HTN (hypertension).   Pt with hx of HTH, taking Losartan 100mg qd  - c/w with home meds, losartan 100mg qd.    #Anxiety and depression.   Pt on home meds, zoloft 25mg qd  - c/w with home meds.      Patient was discharged to: home    New medications: azithromycin 250mg PO (10/14-10/17), keflex 500mg q6h (10/15-10/19)  Changes to old medications: none  Medications that were stopped: none    Items to follow up as outpatient: resolution of cellulitis    Physical exam at the time of discharge:    Constitutional: resting comfortably in bed; NAD  Head: NC/AT  Eyes: PERRL, EOMI, anicteric sclera  ENT: no nasal discharge;  MMM  Neck: supple; no JVD or thyromegaly  Respiratory: CTA B/L; no W/R/R, no retractions  Cardiac: +S1/S2; RRR; no M/R/G  Gastrointestinal: soft, NT/ND; no rebound or guarding; +BSx4  Back: spine midline, no bony tenderness or step-offs; no CVAT B/L  Extremities: WWP, no clubbing or cyanosis; no peripheral edema. Capillary refill <2 sec.   Musculoskeletal: NROM x4; no joint swelling, tenderness or erythema  Vascular: 2+ radial pulses B/L  Dermatologic: skin warm, dry and intact; no rashes, wounds, or scars. L forearm with erythema, edema and TTP improved from previously marked borders. Small healing scratch on left forearm.   Lymphatic: no submandibular or cervical LAD. no palpable L axillary LAD  Neurologic: AAOx3; CNII-XII grossly intact; no focal deficits  Psychiatric: affect and characteristics of appearance, verbalizations, behaviors are appropriate #Discharge: do not delete    Patient is 48 yo M with past medical history of HTN, HIV, TBI i/s/o unintentional OD of fentanyl 4 years afo, and depression who presented with acute left forearm pain and swelling after a cat scratch, found to have cellulitis.     Hospital course (by problem):     #Sepsis.   Pt meets 2/4 criteria [HR 98, WBC 12.97] and source of infection coming from left arm/forearm from cat scratch. Lactate 1.7. In ED, given 3.5L bolus NS and Unasyn 3g IV. Also given azithromycin 500mg q24h which was transitioned to azithromycin 250mg PO daily. Blood cultures have shown no growth to date, and inflammatory markers were trending down at the time of discharge.   - see plan below    #Cellulitis.   Left arm edematous, erythematous, and painful. Erythema marked. Concern for bartonella infection i/s/o cat scratch. Negative for compartment syndrome as neuromuscular intact and +2 radial pulses of LUE. Monitor progression soft tissue erythema.   - may use PRN Tylenol 650 q6h for mild/moderate pain   - start keflex 500mg q6h for 5 days  - start azithromycin PO 250mg for 4 days     #HIV disease.   Hx of HIV diease, per pt VL unremarkable. Per HIE, on 7/27/22 VL 2.08 and CD4 927. PT following Dr. Lucy machado. On home med of Triumeq.   - c/w with home meds, Triumeq.    #HTN (hypertension).   Pt with hx of HTH, taking Losartan 100mg qd  - c/w with home meds, losartan 100mg qd.    #Anxiety and depression.   Pt on home meds, zoloft 25mg qd  - c/w with home meds.    Patient was discharged to: home    New medications: azithromycin 250mg PO (10/14-10/17), keflex 500mg q6h (10/15-10/19)  Changes to old medications: none  Medications that were stopped: none    Items to follow up as outpatient: resolution of cellulitis    Physical exam at the time of discharge:    Constitutional: resting comfortably in bed; NAD  Head: NC/AT  Eyes: PERRL, EOMI, anicteric sclera  ENT: no nasal discharge;  MMM  Neck: supple; no JVD or thyromegaly  Respiratory: CTA B/L; no W/R/R, no retractions  Cardiac: +S1/S2; RRR; no M/R/G  Gastrointestinal: soft, NT/ND; no rebound or guarding; +BSx4  Back: spine midline, no bony tenderness or step-offs; no CVAT B/L  Extremities: WWP, no clubbing or cyanosis; no peripheral edema. Capillary refill <2 sec.   Musculoskeletal: NROM x4; no joint swelling, tenderness or erythema  Vascular: 2+ radial pulses B/L  Dermatologic: skin warm, dry and intact; no rashes, wounds, or scars. L forearm with erythema, edema and TTP improved from previously marked borders. Small healing scratch on left forearm.   Lymphatic: no submandibular or cervical LAD. no palpable L axillary LAD  Neurologic: AAOx3; CNII-XII grossly intact; no focal deficits  Psychiatric: affect and characteristics of appearance, verbalizations, behaviors are appropriate

## 2022-10-14 NOTE — DISCHARGE NOTE PROVIDER - NSDCFUSCHEDAPPT_GEN_ALL_CORE_FT
Dmitri Prado Physician [facility]  NEUROLOGY [id number]  Scheduled Appointment: 12/12/2022

## 2022-10-14 NOTE — DISCHARGE NOTE PROVIDER - NSDCCPCAREPLAN_GEN_ALL_CORE_FT
PRINCIPAL DISCHARGE DIAGNOSIS  Diagnosis: Cellulitis of forearm, left  Assessment and Plan of Treatment: You were admitted to the hospital because you were having redness and warmth of your left arm. This occured due to an infection called cellulitis. This is a common and potentially serious bacterial skin infection. With cellulitis, the bacteria enter the skin. Cellulitis may spread rapidly. Affected skin appears swollen, red, and may be hot and tender. Cellulitis can be life-threatening without proper treatment. You were initially treated with intravenous antibiotics and transitioned to oral pills. Continue to take your pills as prescribed. Please follow up with your primary care doctor in 7-10 days for further evaluation and treatment.         PRINCIPAL DISCHARGE DIAGNOSIS  Diagnosis: Cellulitis of forearm, left  Assessment and Plan of Treatment: You were admitted to the hospital because you were having redness and warmth of your left arm. This occured due to an infection called cellulitis. This is a common and potentially serious bacterial skin infection. With cellulitis, the bacteria enter the skin. Cellulitis may spread rapidly. Affected skin appears swollen, red, and may be hot and tender. Cellulitis can be life-threatening without proper treatment. You were initially treated with intravenous antibiotics and transitioned to oral pills. Continue to take your pills as prescribed:  azithromycin 250mg every day for four days (10/14 - 10/17)  keflex 500mg four times a day for five days (10/15 - 10/19)  Please follow up with your primary care doctor in 7-10 days for further evaluation and treatment.

## 2022-10-14 NOTE — PROGRESS NOTE ADULT - SUBJECTIVE AND OBJECTIVE BOX
ILIANA FINLEY  47y, Male    Patient is a 47y old  Male who presents with a chief complaint of Cellulitis (13 Oct 2022 16:36)      INTERVAL HPI/OVERNIGHT EVENTS:    ROS: otherwise negative      T(C): 36.9 (10-14-22 @ 05:42), Max: 36.9 (10-14-22 @ 05:42)  HR: 90 (10-14-22 @ 05:42) (80 - 100)  BP: 123/80 (10-14-22 @ 05:42) (115/78 - 133/89)  RR: 19 (10-14-22 @ 05:42) (17 - 19)  SpO2: 96% (10-14-22 @ 05:42) (96% - 99%)  Wt(kg): --Vital Signs Last 24 Hrs  T(C): 36.9 (14 Oct 2022 05:42), Max: 36.9 (14 Oct 2022 05:42)  T(F): 98.4 (14 Oct 2022 05:42), Max: 98.4 (14 Oct 2022 05:42)  HR: 90 (14 Oct 2022 05:42) (80 - 100)  BP: 123/80 (14 Oct 2022 05:42) (115/78 - 133/89)  BP(mean): --  RR: 19 (14 Oct 2022 05:42) (17 - 19)  SpO2: 96% (14 Oct 2022 05:42) (96% - 99%)    Parameters below as of 14 Oct 2022 05:42  Patient On (Oxygen Delivery Method): room air        PHYSICAL EXAM:  Constitutional: resting comfortably in bed; NAD  Head: NC/AT  Eyes: PERRL, EOMI, anicteric sclera  ENT: no nasal discharge;  MMM  Neck: supple; no JVD or thyromegaly  Respiratory: CTA B/L; no W/R/R, no retractions  Cardiac: +S1/S2; RRR; no M/R/G  Gastrointestinal: soft, NT/ND; no rebound or guarding; +BSx4  Back: spine midline, no bony tenderness or step-offs; no CVAT B/L  Extremities: WWP, no clubbing or cyanosis; no peripheral edema. Capillary refill <2 sec.   Musculoskeletal: NROM x4; no joint swelling, tenderness or erythema  Vascular: 2+ radial pulses B/L  Dermatologic: skin warm, dry and intact; no rashes, wounds, or scars. L forearm with erythema, edema and TTP improved from previously marked borders. small healing scratch on left forearm.   Lymphatic: no submandibular or cervical LAD. no palpable L axillary LAD  Neurologic: AAOx3; CNII-XII grossly intact; no focal deficits  Psychiatric: affect and characteristics of appearance, verbalizations, behaviors are appropriate      LABS:                        13.2   10.59 )-----------( 197      ( 14 Oct 2022 05:30 )             39.5     10-14    139  |  105  |  12  ----------------------------<  93  4.2   |  23  |  1.08    Ca    9.0      14 Oct 2022 05:30  Phos  2.9     10-14  Mg     2.6     10-14    TPro  8.2  /  Alb  3.7  /  TBili  0.7  /  DBili  x   /  AST  13<L>  /  ALT  34  /  AlkPhos  84  10-13      PT/INR - ( 13 Oct 2022 09:10 )   PT: 13.1 sec;   INR: 1.13          PTT - ( 13 Oct 2022 09:10 )  PTT:28.8 sec    CAPILLARY BLOOD GLUCOSE                MEDICATIONS  (STANDING):  abacavir 600 mG/dolutegravir 50 mG/lamivudine 300 mG 1 Tablet(s) Oral daily  ampicillin/sulbactam  IVPB      ampicillin/sulbactam  IVPB 3 Gram(s) IV Intermittent every 6 hours  azithromycin  IVPB 500 milliGRAM(s) IV Intermittent every 24 hours  enoxaparin Injectable 40 milliGRAM(s) SubCutaneous every 24 hours  losartan 100 milliGRAM(s) Oral every 24 hours  sertraline 25 milliGRAM(s) Oral daily    MEDICATIONS  (PRN):  acetaminophen     Tablet .. 650 milliGRAM(s) Oral every 6 hours PRN Mild Pain (1 - 3), Moderate Pain (4 - 6)      RADIOLOGY & ADDITIONAL TESTS:    Imaging Personally Reviewed:  [x] YES  [ ] NO   ILIANA FINLEY  47y, Male    Patient is a 47y old  Male who presents with a chief complaint of Cellulitis (13 Oct 2022 16:36)      INTERVAL HPI/OVERNIGHT EVENTS: Patient reports improvement in pain from 15/10 to about 8/10. Reports relief with percocet, not much with tylenol. Reports having red streaks up his left arm yesterday which has resolved today. Denies f/c, n/v, diarrhea, SOB, CP.     ROS: otherwise negative      T(C): 36.9 (10-14-22 @ 05:42), Max: 36.9 (10-14-22 @ 05:42)  HR: 90 (10-14-22 @ 05:42) (80 - 100)  BP: 123/80 (10-14-22 @ 05:42) (115/78 - 133/89)  RR: 19 (10-14-22 @ 05:42) (17 - 19)  SpO2: 96% (10-14-22 @ 05:42) (96% - 99%)  Wt(kg): --Vital Signs Last 24 Hrs  T(C): 36.9 (14 Oct 2022 05:42), Max: 36.9 (14 Oct 2022 05:42)  T(F): 98.4 (14 Oct 2022 05:42), Max: 98.4 (14 Oct 2022 05:42)  HR: 90 (14 Oct 2022 05:42) (80 - 100)  BP: 123/80 (14 Oct 2022 05:42) (115/78 - 133/89)  BP(mean): --  RR: 19 (14 Oct 2022 05:42) (17 - 19)  SpO2: 96% (14 Oct 2022 05:42) (96% - 99%)    Parameters below as of 14 Oct 2022 05:42  Patient On (Oxygen Delivery Method): room air        PHYSICAL EXAM:  Constitutional: resting comfortably in bed; NAD  Head: NC/AT  Eyes: PERRL, EOMI, anicteric sclera  ENT: no nasal discharge;  MMM  Neck: supple; no JVD or thyromegaly  Respiratory: CTA B/L; no W/R/R, no retractions  Cardiac: +S1/S2; RRR; no M/R/G  Gastrointestinal: soft, NT/ND; no rebound or guarding; +BSx4  Back: spine midline, no bony tenderness or step-offs; no CVAT B/L  Extremities: WWP, no clubbing or cyanosis; no peripheral edema. Capillary refill <2 sec.   Musculoskeletal: NROM x4; no joint swelling, tenderness or erythema  Vascular: 2+ radial pulses B/L  Dermatologic: skin warm, dry and intact; no rashes, wounds, or scars. L forearm with erythema, edema and TTP improved from previously marked borders. small healing scratch on left forearm.   Lymphatic: no submandibular or cervical LAD. no palpable L axillary LAD  Neurologic: AAOx3; CNII-XII grossly intact; no focal deficits  Psychiatric: affect and characteristics of appearance, verbalizations, behaviors are appropriate      LABS:                        13.2   10.59 )-----------( 197      ( 14 Oct 2022 05:30 )             39.5     10-14    139  |  105  |  12  ----------------------------<  93  4.2   |  23  |  1.08    Ca    9.0      14 Oct 2022 05:30  Phos  2.9     10-14  Mg     2.6     10-14    TPro  8.2  /  Alb  3.7  /  TBili  0.7  /  DBili  x   /  AST  13<L>  /  ALT  34  /  AlkPhos  84  10-13      PT/INR - ( 13 Oct 2022 09:10 )   PT: 13.1 sec;   INR: 1.13          PTT - ( 13 Oct 2022 09:10 )  PTT:28.8 sec    CAPILLARY BLOOD GLUCOSE                MEDICATIONS  (STANDING):  abacavir 600 mG/dolutegravir 50 mG/lamivudine 300 mG 1 Tablet(s) Oral daily  ampicillin/sulbactam  IVPB      ampicillin/sulbactam  IVPB 3 Gram(s) IV Intermittent every 6 hours  azithromycin  IVPB 500 milliGRAM(s) IV Intermittent every 24 hours  enoxaparin Injectable 40 milliGRAM(s) SubCutaneous every 24 hours  losartan 100 milliGRAM(s) Oral every 24 hours  sertraline 25 milliGRAM(s) Oral daily    MEDICATIONS  (PRN):  acetaminophen     Tablet .. 650 milliGRAM(s) Oral every 6 hours PRN Mild Pain (1 - 3), Moderate Pain (4 - 6)      RADIOLOGY & ADDITIONAL TESTS:    Imaging Personally Reviewed:  [x] YES  [ ] NO
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
## 2022-10-14 NOTE — DISCHARGE NOTE NURSING/CASE MANAGEMENT/SOCIAL WORK - NSDCPEFALRISK_GEN_ALL_CORE
For information on Fall & Injury Prevention, visit: [email or web address] OR  [email or web address] OR  https://www.cdc.gov/steadi/patient.html

## 2022-10-14 NOTE — DISCHARGE NOTE PROVIDER - ATTENDING DISCHARGE PHYSICAL EXAMINATION:
Patient was seen and examined at bedside on 10/14/2022 at 930 am. Patient reports dramatic improvement of the redness of his L arm. ROS is otherwise negative. Vitals, labwork and pertinent imaging reviewed - downtrending leukocytosis. Physical exam - NAD, AAO x 4, PERRLA, EOMI, MMM, supple neck, chest - CTA b/l, CV - rrr, s1s2, no m/r/g, abd - soft, NTND, + BS, ext - wwp,, psych - normal affect, skin - grossly improved L arm cellulitis    Plan  -Pt is medically ready for d/c - will c/w Augmentin and Azithromycin   -Will f/u with his ID Dr. Ayala

## 2022-10-14 NOTE — DISCHARGE NOTE NURSING/CASE MANAGEMENT/SOCIAL WORK - PATIENT PORTAL LINK FT
You can access the FollowMyHealth Patient Portal offered by [facility] by registering at the following website: [email or web address]. By joining [employer]’s FollowMyHealth portal, you will also be able to view your health information using other applications (apps) compatible with our system.

## 2022-10-16 LAB
HIV1+2 AB SPEC QL: ABNORMAL
HIV1+2 AB SPEC QL: SIGNIFICANT CHANGE UP

## 2022-10-16 NOTE — CHART NOTE - NSCHARTNOTEFT_GEN_A_CORE
Received a call from [employer] [employer], stating HIV result came back positive. Reviewed the chart and pt is already known HIV positive and taking anti-viral medication.   As known HIV-positive pt, no further action was taken.

## 2022-10-18 ENCOUNTER — TRANSCRIPTION ENCOUNTER (OUTPATIENT)
Age: 47
End: 2022-10-18

## 2022-10-18 LAB
CULTURE RESULTS: SIGNIFICANT CHANGE UP
CULTURE RESULTS: SIGNIFICANT CHANGE UP
SPECIMEN SOURCE: SIGNIFICANT CHANGE UP
SPECIMEN SOURCE: SIGNIFICANT CHANGE UP

## 2022-10-20 DIAGNOSIS — Z79.899 OTHER LONG TERM (CURRENT) DRUG THERAPY: ICD-10-CM

## 2022-10-20 DIAGNOSIS — L03.114 CELLULITIS OF LEFT UPPER LIMB: ICD-10-CM

## 2022-10-20 DIAGNOSIS — A41.9 SEPSIS, UNSPECIFIED ORGANISM: ICD-10-CM
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
## 2022-10-20 DIAGNOSIS — F41.9 ANXIETY DISORDER, UNSPECIFIED: ICD-10-CM

## 2022-10-20 DIAGNOSIS — S40.812A ABRASION OF LEFT UPPER ARM, INITIAL ENCOUNTER: ICD-10-CM

## 2022-10-20 DIAGNOSIS — I10 ESSENTIAL (PRIMARY) HYPERTENSION: ICD-10-CM

## 2022-10-20 DIAGNOSIS — B20 HUMAN IMMUNODEFICIENCY VIRUS [HIV] DISEASE: ICD-10-CM

## 2022-10-20 DIAGNOSIS — Z87.820 PERSONAL HISTORY OF TRAUMATIC BRAIN INJURY: ICD-10-CM

## 2022-10-20 DIAGNOSIS — F32.A DEPRESSION, UNSPECIFIED: ICD-10-CM

## 2022-10-25 ENCOUNTER — TRANSCRIPTION ENCOUNTER (OUTPATIENT)
Age: 47
End: 2022-10-25

## 2022-10-26 PROBLEM — S06.9XAA UNSPECIFIED INTRACRANIAL INJURY WITH LOSS OF CONSCIOUSNESS STATUS UNKNOWN, INITIAL ENCOUNTER: Chronic | Status: ACTIVE | Noted: 2022-10-13

## 2022-10-26 PROBLEM — G54.2 CERVICAL ROOT DISORDERS, NOT ELSEWHERE CLASSIFIED: Chronic | Status: ACTIVE | Noted: 2022-10-13

## 2022-10-26 PROBLEM — Z86.59 PERSONAL HISTORY OF OTHER MENTAL AND BEHAVIORAL DISORDERS: Chronic | Status: ACTIVE | Noted: 2022-10-13

## 2022-10-26 PROBLEM — I10 ESSENTIAL (PRIMARY) HYPERTENSION: Chronic | Status: ACTIVE | Noted: 2022-10-13

## 2022-10-26 PROBLEM — M54.16 RADICULOPATHY, LUMBAR REGION: Chronic | Status: ACTIVE | Noted: 2022-10-13

## 2022-10-26 PROBLEM — B20 HUMAN IMMUNODEFICIENCY VIRUS [HIV] DISEASE: Chronic | Status: ACTIVE | Noted: 2022-10-13

## 2022-10-27 ENCOUNTER — TRANSCRIPTION ENCOUNTER (OUTPATIENT)
Age: 47
End: 2022-10-27

## 2022-11-02 ENCOUNTER — RX RENEWAL (OUTPATIENT)
Age: 47
End: 2022-11-02

## 2022-11-02 ENCOUNTER — APPOINTMENT (OUTPATIENT)
Dept: INFECTIOUS DISEASE | Facility: CLINIC | Age: 47
End: 2022-11-02

## 2022-11-02 ENCOUNTER — TRANSCRIPTION ENCOUNTER (OUTPATIENT)
Age: 47
End: 2022-11-02

## 2022-11-09 ENCOUNTER — TRANSCRIPTION ENCOUNTER (OUTPATIENT)
Age: 47
End: 2022-11-09

## 2022-12-06 ENCOUNTER — RX RENEWAL (OUTPATIENT)
Age: 47
End: 2022-12-06

## 2022-12-09 ENCOUNTER — EMERGENCY (EMERGENCY)
Facility: HOSPITAL | Age: 47
LOS: 1 days | Discharge: HOME | End: 2022-12-09
Attending: EMERGENCY MEDICINE | Admitting: EMERGENCY MEDICINE

## 2022-12-09 VITALS
RESPIRATION RATE: 16 BRPM | WEIGHT: 184.97 LBS | HEART RATE: 123 BPM | TEMPERATURE: 100 F | HEIGHT: 69 IN | OXYGEN SATURATION: 95 % | DIASTOLIC BLOOD PRESSURE: 101 MMHG | SYSTOLIC BLOOD PRESSURE: 169 MMHG

## 2022-12-09 VITALS
HEART RATE: 90 BPM | TEMPERATURE: 98 F | DIASTOLIC BLOOD PRESSURE: 89 MMHG | SYSTOLIC BLOOD PRESSURE: 140 MMHG | OXYGEN SATURATION: 99 % | RESPIRATION RATE: 14 BRPM

## 2022-12-09 DIAGNOSIS — L03.116 CELLULITIS OF LEFT LOWER LIMB: ICD-10-CM

## 2022-12-09 DIAGNOSIS — B20 HUMAN IMMUNODEFICIENCY VIRUS [HIV] DISEASE: ICD-10-CM

## 2022-12-09 DIAGNOSIS — I10 ESSENTIAL (PRIMARY) HYPERTENSION: ICD-10-CM

## 2022-12-09 DIAGNOSIS — Z86.59 PERSONAL HISTORY OF OTHER MENTAL AND BEHAVIORAL DISORDERS: ICD-10-CM

## 2022-12-09 DIAGNOSIS — Z20.822 CONTACT WITH AND (SUSPECTED) EXPOSURE TO COVID-19: ICD-10-CM

## 2022-12-09 DIAGNOSIS — R00.0 TACHYCARDIA, UNSPECIFIED: ICD-10-CM

## 2022-12-09 LAB
ALBUMIN SERPL ELPH-MCNC: 3.9 G/DL — SIGNIFICANT CHANGE UP (ref 3.4–5)
ALP SERPL-CCNC: 91 U/L — SIGNIFICANT CHANGE UP (ref 40–120)
ALT FLD-CCNC: 32 U/L — SIGNIFICANT CHANGE UP (ref 12–42)
ANION GAP SERPL CALC-SCNC: 9 MMOL/L — SIGNIFICANT CHANGE UP (ref 9–16)
APPEARANCE UR: CLEAR — SIGNIFICANT CHANGE UP
APTT BLD: 28.4 SEC — SIGNIFICANT CHANGE UP (ref 27.5–35.5)
AST SERPL-CCNC: 11 U/L — LOW (ref 15–37)
BASOPHILS # BLD AUTO: 0.01 K/UL — SIGNIFICANT CHANGE UP (ref 0–0.2)
BASOPHILS NFR BLD AUTO: 0.1 % — SIGNIFICANT CHANGE UP (ref 0–2)
BILIRUB SERPL-MCNC: 0.7 MG/DL — SIGNIFICANT CHANGE UP (ref 0.2–1.2)
BILIRUB UR-MCNC: NEGATIVE — SIGNIFICANT CHANGE UP
BUN SERPL-MCNC: 14 MG/DL — SIGNIFICANT CHANGE UP (ref 7–23)
CALCIUM SERPL-MCNC: 9.3 MG/DL — SIGNIFICANT CHANGE UP (ref 8.5–10.5)
CHLORIDE SERPL-SCNC: 98 MMOL/L — SIGNIFICANT CHANGE UP (ref 96–108)
CO2 SERPL-SCNC: 29 MMOL/L — SIGNIFICANT CHANGE UP (ref 22–31)
COLOR SPEC: YELLOW — SIGNIFICANT CHANGE UP
CREAT SERPL-MCNC: 1.14 MG/DL — SIGNIFICANT CHANGE UP (ref 0.5–1.3)
DIFF PNL FLD: NEGATIVE — SIGNIFICANT CHANGE UP
EGFR: 79 ML/MIN/1.73M2 — SIGNIFICANT CHANGE UP
EOSINOPHIL # BLD AUTO: 0 K/UL — SIGNIFICANT CHANGE UP (ref 0–0.5)
EOSINOPHIL NFR BLD AUTO: 0 % — SIGNIFICANT CHANGE UP (ref 0–6)
FLUAV AG NPH QL: SIGNIFICANT CHANGE UP
FLUBV AG NPH QL: SIGNIFICANT CHANGE UP
GLUCOSE SERPL-MCNC: 121 MG/DL — HIGH (ref 70–99)
GLUCOSE UR QL: NEGATIVE — SIGNIFICANT CHANGE UP
HCT VFR BLD CALC: 43 % — SIGNIFICANT CHANGE UP (ref 39–50)
HGB BLD-MCNC: 14.4 G/DL — SIGNIFICANT CHANGE UP (ref 13–17)
IMM GRANULOCYTES NFR BLD AUTO: 0.3 % — SIGNIFICANT CHANGE UP (ref 0–0.9)
INR BLD: 1.16 — SIGNIFICANT CHANGE UP (ref 0.88–1.16)
KETONES UR-MCNC: NEGATIVE — SIGNIFICANT CHANGE UP
LACTATE SERPL-SCNC: 1.3 MMOL/L — SIGNIFICANT CHANGE UP (ref 0.4–2)
LACTATE SERPL-SCNC: 2.4 MMOL/L — HIGH (ref 0.4–2)
LEUKOCYTE ESTERASE UR-ACNC: NEGATIVE — SIGNIFICANT CHANGE UP
LYMPHOCYTES # BLD AUTO: 1.36 K/UL — SIGNIFICANT CHANGE UP (ref 1–3.3)
LYMPHOCYTES # BLD AUTO: 11.4 % — LOW (ref 13–44)
MCHC RBC-ENTMCNC: 29.1 PG — SIGNIFICANT CHANGE UP (ref 27–34)
MCHC RBC-ENTMCNC: 33.5 GM/DL — SIGNIFICANT CHANGE UP (ref 32–36)
MCV RBC AUTO: 87 FL — SIGNIFICANT CHANGE UP (ref 80–100)
MONOCYTES # BLD AUTO: 0.69 K/UL — SIGNIFICANT CHANGE UP (ref 0–0.9)
MONOCYTES NFR BLD AUTO: 5.8 % — SIGNIFICANT CHANGE UP (ref 2–14)
NEUTROPHILS # BLD AUTO: 9.79 K/UL — HIGH (ref 1.8–7.4)
NEUTROPHILS NFR BLD AUTO: 82.4 % — HIGH (ref 43–77)
NITRITE UR-MCNC: NEGATIVE — SIGNIFICANT CHANGE UP
NRBC # BLD: 0 /100 WBCS — SIGNIFICANT CHANGE UP (ref 0–0)
PH UR: 7 — SIGNIFICANT CHANGE UP (ref 5–8)
PLATELET # BLD AUTO: 213 K/UL — SIGNIFICANT CHANGE UP (ref 150–400)
POTASSIUM SERPL-MCNC: 3.9 MMOL/L — SIGNIFICANT CHANGE UP (ref 3.5–5.3)
POTASSIUM SERPL-SCNC: 3.9 MMOL/L — SIGNIFICANT CHANGE UP (ref 3.5–5.3)
PROT SERPL-MCNC: 8.1 G/DL — SIGNIFICANT CHANGE UP (ref 6.4–8.2)
PROT UR-MCNC: NEGATIVE MG/DL — SIGNIFICANT CHANGE UP
PROTHROM AB SERPL-ACNC: 13.5 SEC — HIGH (ref 10.5–13.4)
RBC # BLD: 4.94 M/UL — SIGNIFICANT CHANGE UP (ref 4.2–5.8)
RBC # FLD: 13.2 % — SIGNIFICANT CHANGE UP (ref 10.3–14.5)
RSV RNA NPH QL NAA+NON-PROBE: SIGNIFICANT CHANGE UP
SARS-COV-2 RNA SPEC QL NAA+PROBE: SIGNIFICANT CHANGE UP
SODIUM SERPL-SCNC: 136 MMOL/L — SIGNIFICANT CHANGE UP (ref 132–145)
SP GR SPEC: <=1.005 — SIGNIFICANT CHANGE UP (ref 1–1.03)
UROBILINOGEN FLD QL: 0.2 E.U./DL — SIGNIFICANT CHANGE UP
WBC # BLD: 11.89 K/UL — HIGH (ref 3.8–10.5)
WBC # FLD AUTO: 11.89 K/UL — HIGH (ref 3.8–10.5)

## 2022-12-09 PROCEDURE — 99285 EMERGENCY DEPT VISIT HI MDM: CPT

## 2022-12-09 PROCEDURE — 93010 ELECTROCARDIOGRAM REPORT: CPT

## 2022-12-09 RX ORDER — CEPHALEXIN 500 MG
1 CAPSULE ORAL
Qty: 40 | Refills: 0
Start: 2022-12-09 | End: 2022-12-18

## 2022-12-09 RX ORDER — VANCOMYCIN HCL 1 G
1250 VIAL (EA) INTRAVENOUS ONCE
Refills: 0 | Status: COMPLETED | OUTPATIENT
Start: 2022-12-09 | End: 2022-12-09

## 2022-12-09 RX ORDER — KETOROLAC TROMETHAMINE 30 MG/ML
15 SYRINGE (ML) INJECTION ONCE
Refills: 0 | Status: DISCONTINUED | OUTPATIENT
Start: 2022-12-09 | End: 2022-12-09

## 2022-12-09 RX ORDER — SODIUM CHLORIDE 9 MG/ML
2600 INJECTION INTRAMUSCULAR; INTRAVENOUS; SUBCUTANEOUS ONCE
Refills: 0 | Status: COMPLETED | OUTPATIENT
Start: 2022-12-09 | End: 2022-12-09

## 2022-12-09 RX ORDER — CEPHALEXIN 500 MG
500 CAPSULE ORAL ONCE
Refills: 0 | Status: COMPLETED | OUTPATIENT
Start: 2022-12-09 | End: 2022-12-09

## 2022-12-09 RX ORDER — ACETAMINOPHEN 500 MG
975 TABLET ORAL ONCE
Refills: 0 | Status: COMPLETED | OUTPATIENT
Start: 2022-12-09 | End: 2022-12-09

## 2022-12-09 RX ORDER — IBUPROFEN 200 MG
1 TABLET ORAL
Qty: 20 | Refills: 0
Start: 2022-12-09 | End: 2022-12-13

## 2022-12-09 RX ADMIN — Medication 975 MILLIGRAM(S): at 02:33

## 2022-12-09 RX ADMIN — Medication 166.67 MILLIGRAM(S): at 02:34

## 2022-12-09 RX ADMIN — SODIUM CHLORIDE 2600 MILLILITER(S): 9 INJECTION INTRAMUSCULAR; INTRAVENOUS; SUBCUTANEOUS at 02:34

## 2022-12-09 RX ADMIN — Medication 1 TABLET(S): at 06:52

## 2022-12-09 RX ADMIN — Medication 15 MILLIGRAM(S): at 02:34

## 2022-12-09 RX ADMIN — Medication 500 MILLIGRAM(S): at 06:52

## 2022-12-09 NOTE — ED PROVIDER NOTE - PATIENT PORTAL LINK FT
You can access the FollowMyHealth Patient Portal offered by [facility] by registering at the following website: [email or web address]. By joining [employer]’s FollowMyHealth portal, you will also be able to view your health information using other applications (apps) compatible with our system.

## 2022-12-09 NOTE — ED PROVIDER NOTE - CLINICAL SUMMARY MEDICAL DECISION MAKING FREE TEXT BOX
+ cellulitic are demarcated, meeting sepsis criteria -- cultures drawn, given wt based IVF, pain control, IV abx. + cellulitic are demarcated, meeting sepsis criteria -- cultures drawn, given wt based IVF, pain control, IV abx.    clinically improving, improved pain and shrinking area of erythema. d/c home on PO abx, encouraging wound check in 48hrs.
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
## 2022-12-09 NOTE — ED PROVIDER NOTE - PHYSICAL EXAMINATION
VITAL SIGNS: I have reviewed nursing notes and confirm.  CONSTITUTIONAL: Well-developed; well-nourished; in no acute distress.  SKIN: Skin exam is warm and dry, no acute rash.  HEAD: Normocephalic; atraumatic.  EYES: PERRL, EOM intact; conjunctiva and sclera clear.  ENT: No nasal discharge; airway clear.  CARD: + tachycardic regular rhythm  RESP: Unlabored  ABD: soft; non-distended; non-tender  EXT: Normal ROM. + 2x2cm ulceration mid left anterior thigh w/purulent discharge and surrounding [id number] warm/tender/blanching erythema, no significant induration, distal pulses intact  NEURO: Alert, oriented. Grossly unremarkable.  PSYCH: Cooperative, appropriate.

## 2022-12-10 LAB
CULTURE RESULTS: SIGNIFICANT CHANGE UP
SPECIMEN SOURCE: SIGNIFICANT CHANGE UP

## 2022-12-12 ENCOUNTER — APPOINTMENT (OUTPATIENT)
Age: 47
End: 2022-12-12

## 2022-12-12 VITALS
HEART RATE: 98 BPM | OXYGEN SATURATION: 97 % | BODY MASS INDEX: 27.55 KG/M2 | WEIGHT: 186 LBS | SYSTOLIC BLOOD PRESSURE: 151 MMHG | DIASTOLIC BLOOD PRESSURE: 103 MMHG | HEIGHT: 69 IN | TEMPERATURE: 97.2 F

## 2022-12-12 PROCEDURE — 99214 OFFICE O/P EST MOD 30 MIN: CPT
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
## 2022-12-12 NOTE — PHYSICAL EXAM
[FreeTextEntry1] : General: this is a pleasant patient in no acute distress\par \par HEENT conjuntiva are normal\par \par Mental status:\par Alert and oriented to person, place and time\par Memory is normal \par Language is normal \par \par Head:\par Palpation of cranium and jaw: ok\par Occipital nerve tenderness: ok\par \par Cervical Spine:\par Palpation: ok\par Lateral Rotation: ok\par Lateral Flexion: ok\par Flexion + rotation: ok\par \par Eye movements:\par extra-occular movements in tact without nystagmus\par Saccades: ok\par Smooth Pursuit: ok\par Visually enhanced VOR: ok\par Near point of convergence: 11cm\par \par Gait: stable, able to tip toe heel and tandem\par \par Stances:\par Romberg: stable\par Shapened romberg: stable with mild sway\par \par \par \par

## 2022-12-12 NOTE — HISTORY OF PRESENT ILLNESS
[FreeTextEntry1] : ILIANA FINLEY is a 47 year who returns to follow up for brain injury\par \par last visit was 10/22/2022\par \par since last visit messaged about effects of zoloft.  it helps him be more generally calm in many situations and stops mind from racing. but also h cannot get erections easily anymore.  \par \par 5 weeks in [facility] didn't exercise because off his schedule but now back in his routines in [facility]\par \par meditation has been very hard but he does it.  tries most most mornings but doesn't always do it, probably does it 5-7 days per week.\par \par \par

## 2022-12-12 NOTE — PHYSICAL EXAM
[FreeTextEntry1] : General: this is a pleasant patient in no acute distress\par \par HEENT conjunctiva are normal\par \par Mental status:\par Alert and oriented to person, place and time\par Memory: registers 5/5, recalls 5/5\par Attention:world backwards nl\par Language is normal \par \par Head:\par Palpation of cranium and jaw: ok\par Occipital nerve tenderness: ok\par \par Cervical Spine:\par Palpation: ok\par Lateral Rotation: ok\par Lateral Flexion ok\par Flexion + rotation ok\par \par Eye movements:\par extra-occular movements in tact without nystagmus\par Saccades:\par Smooth Pursuit: ok\par Visually enhanced VOR: ok\par Near point of convergence: 11cm\par \par Other cranial nerves: pupils equal, round and reactive to light. Face symmetric and facial strength symmetric, facial sensation symmetric, hearing present bilaterally to finger rub, tongue and uvula midline. neck flexion and extension normal strength.\par \par Motor: normal bulk and tone throughout. no abnormal movements.  Full 5/5 strength uppers and lower extremities proximally and distally\par \par Sensory: in tact and symmetric to vibration, light tough, pin prick, temperature\par \par Cerebellar: normal finger-nose-finger bilaterally\par \par Reflexes: 2+ in the upper and lower extremities and symmetric.  toes are bilaterally downgoing.\par \par Gait: stable, able to tip toe heel and tandem\par \par Stances:\par Romberg:  stable\par Shapened romberg: sway but stable\par Single leg, eyes closed: step after 3 seconds\par

## 2022-12-22 ENCOUNTER — APPOINTMENT (OUTPATIENT)
Dept: INFECTIOUS DISEASE | Facility: CLINIC | Age: 47
End: 2022-12-22

## 2023-01-06 ENCOUNTER — EMERGENCY (EMERGENCY)
Facility: HOSPITAL | Age: 48
LOS: 1 days | Discharge: HOME | End: 2023-01-06
Attending: EMERGENCY MEDICINE | Admitting: EMERGENCY MEDICINE
Payer: MEDICAID

## 2023-01-06 VITALS
OXYGEN SATURATION: 98 % | RESPIRATION RATE: 16 BRPM | HEART RATE: 87 BPM | SYSTOLIC BLOOD PRESSURE: 146 MMHG | TEMPERATURE: 98 F | DIASTOLIC BLOOD PRESSURE: 95 MMHG

## 2023-01-06 VITALS
HEIGHT: 69 IN | TEMPERATURE: 98 F | DIASTOLIC BLOOD PRESSURE: 94 MMHG | SYSTOLIC BLOOD PRESSURE: 132 MMHG | OXYGEN SATURATION: 97 % | HEART RATE: 91 BPM | RESPIRATION RATE: 16 BRPM

## 2023-01-06 PROCEDURE — 99284 EMERGENCY DEPT VISIT MOD MDM: CPT

## 2023-01-06 PROCEDURE — 70450 CT HEAD/BRAIN W/O DYE: CPT | Mod: 26

## 2023-01-06 RX ORDER — ACETAMINOPHEN 500 MG
975 TABLET ORAL ONCE
Refills: 0 | Status: COMPLETED | OUTPATIENT
Start: 2023-01-06 | End: 2023-01-06

## 2023-01-06 RX ADMIN — Medication 975 MILLIGRAM(S): at 03:39

## 2023-01-06 NOTE — ED PROVIDER NOTE - PATIENT PORTAL LINK FT
You can access the FollowMyHealth Patient Portal offered by [facility] by registering at the following website: [email or web address]. By joining [employer]’s FollowMyHealth portal, you will also be able to view your health information using other applications (apps) compatible with our system.

## 2023-01-06 NOTE — ED PROVIDER NOTE - SECONDARY DIAGNOSIS.
Patient seen and examined. I discussed my findings with the attending physician. Please see my progress note for complete details.    Carissa Livingston MD  4/29/2017     Minor head injury Visit for wound check

## 2023-01-06 NOTE — ED PROVIDER NOTE - CARE PROVIDER_API CALL
Jorje Olivia (DO)  [address], [facility] [place]   [address]  Phone: [phone]  Fax: [phone]  Follow Up Time: 7-10 Days

## 2023-01-06 NOTE — ED ADULT NURSE NOTE - NSICDXPASTMEDICALHX_GEN_ALL_CORE_FT
PAST MEDICAL HISTORY:  Cervical nerve root compression     History of depression     HIV disease     HTN (hypertension)     Lumbar nerve root compression     TBI (traumatic brain injury)
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
## 2023-01-06 NOTE — ED ADULT NURSE NOTE - CHIEF COMPLAINT QUOTE
Pt requesting wound check to LLE. Pt states he was seen at [facility] ED x1 month ago for cellulitis. Pt denies fevers or chills. Pt states "they told me to come back it there was a bump and I see a little bump".

## 2023-01-06 NOTE — ED ADULT TRIAGE NOTE - CHIEF COMPLAINT QUOTE
Pt requesting wound check to LLE. Pt states he was seen at [facility] ED x1 month ago for cellulitis. Pt denies fevers or chills. Pt states "they told me to come back it there was a bump and I see a little bump".

## 2023-01-06 NOTE — ED PROVIDER NOTE - CARE PLAN
1 Principal Discharge DX:	Scalp laceration  Secondary Diagnosis:	Minor head injury  Secondary Diagnosis:	Visit for wound check

## 2023-01-06 NOTE — ED PROVIDER NOTE - PHYSICAL EXAMINATION
VSS in NAD non toxic appearing   (+) punctate laceration scalp, no bleeding, no hematoma   EOMI PERRL OP clear  heart RRR no murmur   lungs CTA no wheezing no rales no rhonchi   abd soft NT ND no CVAT no guarding no rebound   normal neuro exam CN I-XII grossly intact, no groos motor or sensory deficits  no peripheral c/c/e
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
## 2023-01-09 DIAGNOSIS — Z86.59 PERSONAL HISTORY OF OTHER MENTAL AND BEHAVIORAL DISORDERS: ICD-10-CM

## 2023-01-09 DIAGNOSIS — S09.90XA UNSPECIFIED INJURY OF HEAD, INITIAL ENCOUNTER: ICD-10-CM

## 2023-01-09 DIAGNOSIS — F17.200 NICOTINE DEPENDENCE, UNSPECIFIED, UNCOMPLICATED: ICD-10-CM

## 2023-01-09 DIAGNOSIS — B20 HUMAN IMMUNODEFICIENCY VIRUS [HIV] DISEASE: ICD-10-CM

## 2023-01-09 DIAGNOSIS — S01.01XA LACERATION WITHOUT FOREIGN BODY OF SCALP, INITIAL ENCOUNTER: ICD-10-CM
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
## 2023-01-09 DIAGNOSIS — I10 ESSENTIAL (PRIMARY) HYPERTENSION: ICD-10-CM

## 2023-01-18 ENCOUNTER — APPOINTMENT (OUTPATIENT)
Dept: INFECTIOUS DISEASE | Facility: CLINIC | Age: 48
End: 2023-01-18
Payer: MEDICAID

## 2023-01-18 VITALS
DIASTOLIC BLOOD PRESSURE: 92 MMHG | TEMPERATURE: 96 F | SYSTOLIC BLOOD PRESSURE: 134 MMHG | OXYGEN SATURATION: 100 % | BODY MASS INDEX: 27.25 KG/M2 | HEART RATE: 122 BPM | HEIGHT: 69 IN | WEIGHT: 184 LBS

## 2023-01-18 PROCEDURE — 99213 OFFICE O/P EST LOW 20 MIN: CPT

## 2023-01-18 RX ORDER — SERTRALINE 25 MG/1
25 TABLET, FILM COATED ORAL DAILY
Qty: 30 | Refills: 5 | Status: COMPLETED | COMMUNITY
Start: 2022-09-22 | End: 2023-01-18

## 2023-01-18 RX ORDER — CEPHALEXIN 500 MG/1
500 TABLET ORAL
Qty: 40 | Refills: 0 | Status: COMPLETED | COMMUNITY
Start: 2022-12-09 | End: 2023-01-18

## 2023-01-18 RX ORDER — AZITHROMYCIN 250 MG/1
250 TABLET, FILM COATED ORAL
Qty: 4 | Refills: 0 | Status: COMPLETED | COMMUNITY
Start: 2022-10-14 | End: 2023-01-18

## 2023-01-18 NOTE — HISTORY OF PRESENT ILLNESS
[FreeTextEntry1] : 47 year old male with HIV here with facial swelling. A few days ago he developed swelling of L lower lip and R jaw. Reports cutting himself when shaving then subsequently developing swelling. Lip lesion is painful. No drainage from either. \par \par Regarding HIV, he was started on Biktarvy in May 2022 but stopped it one month later due to weight gain. He was off HIV medications in mid-June and then started Triumeq in July 2022. He reports being 100 % compliant with triumeq.

## 2023-01-18 NOTE — PHYSICAL EXAM
[General Appearance - Alert] : alert [Sclera] : the sclera and conjunctiva were normal [Neck Appearance] : the appearance of the neck was normal [Heart Rate And Rhythm] : heart rate was normal and rhythm regular [Edema] : there was no peripheral edema [Abdomen Soft] : soft [No Palpable Adenopathy] : no palpable adenopathy [Range of Motion to Joints] : range of motion to joints [] : no rash [Motor Exam] : the motor exam was normal [Oriented To Time, Place, And Person] : oriented to person, place, and time [FreeTextEntry1] : + area of swelling on right side of chin, + induration, no active drainage, tender to touch,  + herpetic lesion on left side of face

## 2023-01-19 ENCOUNTER — NON-APPOINTMENT (OUTPATIENT)
Age: 48
End: 2023-01-19

## 2023-01-19 ENCOUNTER — TRANSCRIPTION ENCOUNTER (OUTPATIENT)
Age: 48
End: 2023-01-19

## 2023-01-19 ENCOUNTER — APPOINTMENT (OUTPATIENT)
Dept: CT IMAGING | Facility: CLINIC | Age: 48
End: 2023-01-19
Payer: MEDICAID

## 2023-01-19 ENCOUNTER — OUTPATIENT (OUTPATIENT)
Dept: OUTPATIENT SERVICES | Facility: HOSPITAL | Age: 48
LOS: 1 days | End: 2023-01-19

## 2023-01-19 LAB
ALBUMIN SERPL ELPH-MCNC: 4.5 G/DL
ALP BLD-CCNC: 97 U/L
ALT SERPL-CCNC: 20 U/L
ANION GAP SERPL CALC-SCNC: 9 MMOL/L
AST SERPL-CCNC: 14 U/L
BASOPHILS # BLD AUTO: 0.03 K/UL
BASOPHILS NFR BLD AUTO: 0.2 %
BILIRUB SERPL-MCNC: 0.4 MG/DL
BUN SERPL-MCNC: 20 MG/DL
CALCIUM SERPL-MCNC: 10.1 MG/DL
CD3 CELLS # BLD: 1851 CELLS/UL
CD3 CELLS NFR BLD: 67 %
CD3+CD4+ CELLS # BLD: 1060 CELLS/UL
CD3+CD4+ CELLS NFR BLD: 38 %
CD3+CD4+ CELLS/CD3+CD8+ CLL SPEC: 1.39 RATIO
CD3+CD8+ CELLS # SPEC: 765 CELLS/UL
CD3+CD8+ CELLS NFR BLD: 28 %
CHLORIDE SERPL-SCNC: 101 MMOL/L
CO2 SERPL-SCNC: 30 MMOL/L
CREAT SERPL-MCNC: 1.28 MG/DL
EGFR: 69 ML/MIN/1.73M2
EOSINOPHIL # BLD AUTO: 0.18 K/UL
EOSINOPHIL NFR BLD AUTO: 1.2 %
GLUCOSE SERPL-MCNC: 105 MG/DL
HCT VFR BLD CALC: 47.3 %
HGB BLD-MCNC: 15.5 G/DL
HIV1 RNA # SERPL NAA+PROBE: NORMAL
HIV1 RNA # SERPL NAA+PROBE: NORMAL COPIES/ML
IMM GRANULOCYTES NFR BLD AUTO: 0.2 %
LYMPHOCYTES # BLD AUTO: 3.6 K/UL
LYMPHOCYTES NFR BLD AUTO: 24.8 %
MAN DIFF?: NORMAL
MCHC RBC-ENTMCNC: 29.4 PG
MCHC RBC-ENTMCNC: 32.8 GM/DL
MCV RBC AUTO: 89.8 FL
MONOCYTES # BLD AUTO: 1.07 K/UL
MONOCYTES NFR BLD AUTO: 7.4 %
NEUTROPHILS # BLD AUTO: 9.58 K/UL
NEUTROPHILS NFR BLD AUTO: 66.2 %
PLATELET # BLD AUTO: 303 K/UL
POTASSIUM SERPL-SCNC: 4.6 MMOL/L
PROT SERPL-MCNC: 8 G/DL
RBC # BLD: 5.27 M/UL
RBC # FLD: 13.6 %
SODIUM SERPL-SCNC: 140 MMOL/L
VIRAL LOAD INTERP: NORMAL
VIRAL LOAD LOG: NORMAL LG COP/ML
WBC # FLD AUTO: 14.49 K/UL

## 2023-01-19 PROCEDURE — 70487 CT MAXILLOFACIAL W/DYE: CPT | Mod: 26

## 2023-01-23 ENCOUNTER — NON-APPOINTMENT (OUTPATIENT)
Age: 48
End: 2023-01-23

## 2023-01-23 ENCOUNTER — APPOINTMENT (OUTPATIENT)
Dept: FAMILY MEDICINE | Facility: CLINIC | Age: 48
End: 2023-01-23
Payer: MEDICAID

## 2023-01-23 VITALS
WEIGHT: 184 LBS | HEIGHT: 69 IN | SYSTOLIC BLOOD PRESSURE: 147 MMHG | BODY MASS INDEX: 27.25 KG/M2 | HEART RATE: 117 BPM | TEMPERATURE: 96.7 F | OXYGEN SATURATION: 100 % | DIASTOLIC BLOOD PRESSURE: 94 MMHG

## 2023-01-23 VITALS — DIASTOLIC BLOOD PRESSURE: 84 MMHG | SYSTOLIC BLOOD PRESSURE: 132 MMHG

## 2023-01-23 DIAGNOSIS — R22.0 LOCALIZED SWELLING, MASS AND LUMP, HEAD: ICD-10-CM

## 2023-01-23 DIAGNOSIS — Z86.59 PERSONAL HISTORY OF OTHER MENTAL AND BEHAVIORAL DISORDERS: ICD-10-CM

## 2023-01-23 DIAGNOSIS — F32.A ANXIETY DISORDER, UNSPECIFIED: ICD-10-CM

## 2023-01-23 DIAGNOSIS — A53.0 LATENT SYPHILIS, UNSPECIFIED AS EARLY OR LATE: ICD-10-CM

## 2023-01-23 DIAGNOSIS — B20 HUMAN IMMUNODEFICIENCY VIRUS [HIV] DISEASE: ICD-10-CM

## 2023-01-23 DIAGNOSIS — M54.50 LOW BACK PAIN, UNSPECIFIED: ICD-10-CM

## 2023-01-23 DIAGNOSIS — M25.511 PAIN IN RIGHT SHOULDER: ICD-10-CM

## 2023-01-23 DIAGNOSIS — Z00.00 ENCOUNTER FOR GENERAL ADULT MEDICAL EXAMINATION W/OUT ABNORMAL FINDINGS: ICD-10-CM

## 2023-01-23 DIAGNOSIS — F41.9 ANXIETY DISORDER, UNSPECIFIED: ICD-10-CM

## 2023-01-23 DIAGNOSIS — Z78.9 OTHER SPECIFIED HEALTH STATUS: ICD-10-CM

## 2023-01-23 DIAGNOSIS — Z87.2 PERSONAL HISTORY OF DISEASES OF THE SKIN AND SUBCUTANEOUS TISSUE: ICD-10-CM

## 2023-01-23 DIAGNOSIS — S46.111A STRAIN OF MUSCLE, FASCIA AND TENDON OF LONG HEAD OF BICEPS, RIGHT ARM, INITIAL ENCOUNTER: ICD-10-CM

## 2023-01-23 DIAGNOSIS — Z82.49 FAMILY HISTORY OF ISCHEMIC HEART DISEASE AND OTHER DISEASES OF THE CIRCULATORY SYSTEM: ICD-10-CM

## 2023-01-23 DIAGNOSIS — Z83.3 FAMILY HISTORY OF DIABETES MELLITUS: ICD-10-CM

## 2023-01-23 DIAGNOSIS — B00.9 HERPESVIRAL INFECTION, UNSPECIFIED: ICD-10-CM

## 2023-01-23 PROCEDURE — 81002 URINALYSIS NONAUTO W/O SCOPE: CPT

## 2023-01-23 PROCEDURE — 99386 PREV VISIT NEW AGE 40-64: CPT | Mod: 25

## 2023-01-23 RX ORDER — AMOXICILLIN AND CLAVULANATE POTASSIUM 875; 125 MG/1; MG/1
875-125 TABLET, COATED ORAL
Qty: 20 | Refills: 0 | Status: DISCONTINUED | COMMUNITY
Start: 2023-01-18 | End: 2023-01-23

## 2023-01-23 RX ORDER — IBUPROFEN 600 MG/1
600 TABLET ORAL
Qty: 20 | Refills: 0 | Status: DISCONTINUED | COMMUNITY
Start: 2022-12-09 | End: 2023-01-23

## 2023-01-23 RX ORDER — VALACYCLOVIR 1 G/1
1 TABLET, FILM COATED ORAL
Qty: 10 | Refills: 0 | Status: DISCONTINUED | COMMUNITY
Start: 2023-01-18 | End: 2023-01-23

## 2023-01-23 RX ORDER — SULFAMETHOXAZOLE AND TRIMETHOPRIM 800; 160 MG/1; MG/1
800-160 TABLET ORAL TWICE DAILY
Qty: 20 | Refills: 0 | Status: DISCONTINUED | COMMUNITY
Start: 2023-01-18 | End: 2023-01-23

## 2023-01-23 RX ORDER — SULFAMETHOXAZOLE AND TRIMETHOPRIM 800; 160 MG/1; MG/1
800-160 TABLET ORAL
Qty: 20 | Refills: 0 | Status: DISCONTINUED | COMMUNITY
Start: 2022-12-09 | End: 2023-01-23

## 2023-01-23 RX ORDER — ABACAVIR SULFATE, DOLUTEGRAVIR SODIUM, LAMIVUDINE 600; 50; 300 MG/1; MG/1; MG/1
600-50-300 TABLET, FILM COATED ORAL
Qty: 30 | Refills: 5 | Status: ACTIVE | COMMUNITY
Start: 2022-07-14

## 2023-01-23 RX ORDER — SERTRALINE HYDROCHLORIDE 25 MG/1
25 TABLET, FILM COATED ORAL
Refills: 0 | Status: ACTIVE | COMMUNITY
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
## 2023-01-23 NOTE — HEALTH RISK ASSESSMENT
[Good] : ~his/her~  mood as  good [Never] : Never [1 or 2 (0 pts)] : 1 or 2 (0 points) [Never (0 pts)] : Never (0 points) [No] : In the past 12 months have you used drugs other than those required for medical reasons? No [0] : 2) Feeling down, depressed, or hopeless: Not at all (0) [PHQ-2 Negative - No further assessment needed] : PHQ-2 Negative - No further assessment needed [Audit-CScore] : 0 [de-identified] : fruits, veggies  [de-identified] : exercises  [[id number]] : 0 [Patient declined colonoscopy] : Patient declined colonoscopy [HIV test declined] : HIV test declined [Hepatitis C test offered] : Hepatitis C test offered [Change in mental status noted] : No change in mental status noted [Language] : denies difficulty with language [None] : None [Alone] : lives alone [Unemployed] : unemployed [Significant Other] : lives with significant other [Sexually Active] : sexually active [High Risk Behavior] : high risk behavior [Feels Safe at Home] : Feels safe at home [Fully functional (bathing, dressing, toileting, transferring, walking, feeding)] : Fully functional (bathing, dressing, toileting, transferring, walking, feeding) [Fully functional (using the telephone, shopping, preparing meals, housekeeping, doing laundry, using] : Fully functional and needs no help or supervision to perform IADLs (using the telephone, shopping, preparing meals, housekeeping, doing laundry, using transportation, managing medications and managing finances) [Reports changes in hearing] : Reports no changes in hearing [Reports changes in vision] : Reports no changes in vision [ColonoscopyComments] : will refer [de-identified] : m

## 2023-01-26 ENCOUNTER — APPOINTMENT (OUTPATIENT)
Dept: INFECTIOUS DISEASE | Facility: CLINIC | Age: 48
End: 2023-01-26
Payer: MEDICAID

## 2023-01-26 VITALS
OXYGEN SATURATION: 100 % | WEIGHT: 185 LBS | HEART RATE: 99 BPM | SYSTOLIC BLOOD PRESSURE: 156 MMHG | HEIGHT: 69 IN | TEMPERATURE: 96.4 F | BODY MASS INDEX: 27.4 KG/M2 | DIASTOLIC BLOOD PRESSURE: 96 MMHG

## 2023-01-26 DIAGNOSIS — L03.211 CELLULITIS OF FACE: ICD-10-CM

## 2023-01-26 PROCEDURE — 99213 OFFICE O/P EST LOW 20 MIN: CPT

## 2023-01-26 NOTE — PHYSICAL EXAM
[General Appearance - Alert] : alert [General Appearance - In No Acute Distress] : in no acute distress [Sclera] : the sclera and conjunctiva were normal [Outer Ear] : the ears and nose were normal in appearance [Neck Appearance] : the appearance of the neck was normal [Heart Rate And Rhythm] : heart rate was normal and rhythm regular [Heart Sounds] : normal S1 and S2 [Edema] : there was no peripheral edema [Abdomen Soft] : soft [No Palpable Adenopathy] : no palpable adenopathy [Musculoskeletal - Swelling] : no joint swelling [] : no rash [Motor Exam] : the motor exam was normal [Oriented To Time, Place, And Person] : oriented to person, place, and time

## 2023-01-26 NOTE — HISTORY OF PRESENT ILLNESS
[FreeTextEntry1] : 47 year old male with HIV here for follow up.  Presented last week with facial cellulitis.  CT mandible did not show any fluid collection.  He was started on Bactrim and Augmentin.  He reports improvement in pain and swelling.  Reports compliance with the medications.  Has had multiple episodes of cellulitis in the past.

## 2023-01-27 ENCOUNTER — APPOINTMENT (OUTPATIENT)
Dept: FAMILY MEDICINE | Facility: CLINIC | Age: 48
End: 2023-01-27

## 2023-01-27 LAB
MRSA SPEC QL CULT: NOT DETECTED
STAPH AUREUS (SA): NOT DETECTED

## 2023-01-31 ENCOUNTER — LABORATORY RESULT (OUTPATIENT)
Age: 48
End: 2023-01-31

## 2023-01-31 ENCOUNTER — APPOINTMENT (OUTPATIENT)
Dept: FAMILY MEDICINE | Facility: CLINIC | Age: 48
End: 2023-01-31
Payer: MEDICAID

## 2023-01-31 PROCEDURE — XXXXX: CPT | Mod: 1L

## 2023-02-02 LAB
25(OH)D3 SERPL-MCNC: 24.8 NG/ML
ALBUMIN SERPL ELPH-MCNC: 4.2 G/DL
ALP BLD-CCNC: 76 U/L
ALT SERPL-CCNC: 29 U/L
ANION GAP SERPL CALC-SCNC: 12 MMOL/L
AST SERPL-CCNC: 17 U/L
BASOPHILS # BLD AUTO: 0.02 K/UL
BASOPHILS NFR BLD AUTO: 0.3 %
BILIRUB SERPL-MCNC: 0.3 MG/DL
BUN SERPL-MCNC: 9 MG/DL
C TRACH RRNA SPEC QL NAA+PROBE: NOT DETECTED
CALCIUM SERPL-MCNC: 9.7 MG/DL
CHLORIDE SERPL-SCNC: 106 MMOL/L
CHOLEST SERPL-MCNC: 235 MG/DL
CO2 SERPL-SCNC: 25 MMOL/L
CREAT SERPL-MCNC: 1.17 MG/DL
EGFR: 77 ML/MIN/1.73M2
EOSINOPHIL # BLD AUTO: 0.3 K/UL
EOSINOPHIL NFR BLD AUTO: 4.3 %
ESTIMATED AVERAGE GLUCOSE: 114 MG/DL
GLUCOSE SERPL-MCNC: 106 MG/DL
HBA1C MFR BLD HPLC: 5.6 %
HCT VFR BLD CALC: 45.9 %
HDLC SERPL-MCNC: 59 MG/DL
HGB BLD-MCNC: 14.5 G/DL
IMM GRANULOCYTES NFR BLD AUTO: 0.1 %
LDLC SERPL CALC-MCNC: 158 MG/DL
LYMPHOCYTES # BLD AUTO: 2.94 K/UL
LYMPHOCYTES NFR BLD AUTO: 42.5 %
MAN DIFF?: NORMAL
MCHC RBC-ENTMCNC: 28.1 PG
MCHC RBC-ENTMCNC: 31.6 GM/DL
MCV RBC AUTO: 89 FL
MONOCYTES # BLD AUTO: 0.49 K/UL
MONOCYTES NFR BLD AUTO: 7.1 %
N GONORRHOEA RRNA SPEC QL NAA+PROBE: NOT DETECTED
NEUTROPHILS # BLD AUTO: 3.15 K/UL
NEUTROPHILS NFR BLD AUTO: 45.7 %
NONHDLC SERPL-MCNC: 176 MG/DL
PLATELET # BLD AUTO: 284 K/UL
POTASSIUM SERPL-SCNC: 4.2 MMOL/L
PROT SERPL-MCNC: 7.2 G/DL
RBC # BLD: 5.16 M/UL
RBC # FLD: 14.6 %
SODIUM SERPL-SCNC: 143 MMOL/L
SOURCE AMPLIFICATION: NORMAL
SOURCE ANAL: NORMAL
SOURCE ORAL: NORMAL
T PALLIDUM AB SER QL IA: POSITIVE
TRIGL SERPL-MCNC: 90 MG/DL
TSH SERPL-ACNC: 0.76 UIU/ML
WBC # FLD AUTO: 6.91 K/UL

## 2023-02-03 ENCOUNTER — NON-APPOINTMENT (OUTPATIENT)
Age: 48
End: 2023-02-03

## 2023-02-03 LAB
HCV AB SER QL: NONREACTIVE
HCV S/CO RATIO: 0.12 S/CO

## 2023-02-10 NOTE — PATIENT PROFILE ADULT - NSTRANSFERBELONGINGSRESP_GEN_A_NUR
Chief complaint:   Chief Complaint   Patient presents with   • Arm Pain       Vitals:  Visit Vitals  BP (!) 160/80   Pulse 78   Resp 12   Ht 4' 9\" (1.448 m)   Wt 47.6 kg (105 lb)   SpO2 99%   BMI 22.72 kg/m²       HISTORY OF PRESENT ILLNESS     Arm Pain   The incident occurred more than 1 week ago. The incident occurred at home. The injury mechanism was repetitive motion. The pain is present in the left forearm and left elbow. The quality of the pain is described as aching and shooting. The pain does not radiate. Pertinent negatives include no chest pain. She has tried acetaminophen, heat, elevation, ice and immobilization for the symptoms. The treatment provided no relief.       Other significant problems:  Patient Active Problem List    Diagnosis Date Noted   • Essential (primary) hypertension      Priority: High   • TIA (transient ischemic attack) 06/18/2019     Priority: Medium   • DJD (degenerative joint disease) 04/29/2013     Priority: Medium   • Post-COVID-19 syndrome 01/12/2023     Priority: Low   • Hyponatremia 06/14/2022     Priority: Low   • Left arm pain 06/13/2022     Priority: Low   • Coronary artery disease of native artery of native heart with stable angina pectoris (CMS/[facility]) 06/13/2022     Priority: Low   • Inflammatory arthritis 12/17/2021     Priority: Low   • Leg swelling 06/10/2021     Priority: Low   • Torticollis, spasmodic 04/19/2021     Priority: Low   • Stage 3a chronic kidney disease (CMS/[facility]) 03/02/2021     Priority: Low   • Other headache syndrome 06/12/2020     Priority: Low   • Medicare annual wellness visit, subsequent 07/31/2018     Priority: Low   • Status post hysterectomy 09/26/2017     Priority: Low     S/P robotic assisted total laparoscopic hysterectomy with bilateral salpingo oophorectomy, pelvic washings, appendectomy      • Diverticulosis of colon 10/13/2016     Priority: Low   • Primary osteoarthritis 08/29/2016     Priority: Low   • Hyperlipidemia, mixed 08/29/2016      Priority: Low   • Osteoporosis, unspecified 08/07/2013     Priority: Low   • GERD (gastroesophageal reflux disease) 08/02/2013     Priority: Low       PAST MEDICAL, FAMILY AND SOCIAL HISTORY     Medications:  Current Outpatient Medications   Medication Sig Dispense Refill   • methylPREDNISolone (MEDROL DOSEPAK) 4 MG tablet follow package directions 21 tablet 0   • carvedilol (COREG) 6.25 MG tablet Take 1 tablet by mouth every 12 hours. 180 tablet 3   • lisinopril (ZESTRIL) 10 MG tablet Take 1 tablet by mouth daily. 30 tablet 3   • clotrimazole (LOTRIMIN) 1 % cream Apply 1 application topically 2 times daily. Apply to affected area twice daily 30 g 1   • clotrimazole-betamethasone (LOTRISONE) 1-0.05 % cream apply to affected area topically twice daily 30 g 1   • omeprazole (PriLOSEC) 40 MG capsule TAKE ONE CAPSULE BY MOUTH ONE TIME DAILY 90 capsule 1   • atorvastatin (LIPITOR) 10 MG tablet TAKE ONE TABLET BY MOUTH ONE TIME DAILY 90 tablet 3   • aspirin (ECOTRIN) 81 MG EC tablet Take 81 mg by mouth daily.     • Ascorbic Acid (VITAMIN C PO) Take 1 tablet by mouth daily.     • VITAMIN D PO Take 1 tablet by mouth daily.     • traMADol (ULTRAM) 50 MG tablet TAKE ONE TABLET BY MOUTH EVERY EIGHT HOURS AS NEEDED FOR PAIN (Patient taking differently: Take 50 mg by mouth every 8 hours as needed.) 90 tablet 0     No current facility-administered medications for this visit.       Allergies:  ALLERGIES:  No Known Allergies    Past Medical  History/Surgeries:  Past Medical History:   Diagnosis Date   • Arthritis     knees, hip, back    • Bilateral ovarian cysts 10/2016    detected per CT when being evaluated for diverticulitis   • Breast cancer (CMS/HCC) 1991    left breast cancer- tx with surgery and chemo   • Diverticulitis 10/2016   • Essential (primary) hypertension    • Gastroesophageal reflux disease    • Hyperlipidemia    • Wears dentures     upper and lower       Past Surgical History:   Procedure Laterality Date   •  Appendectomy  2017    robot appe per [facility]   • Arthroscopy of joint unlisted Left ~2012    left knee scope, cartilege   • Arthroscopy of joint unlisted Right ~2013    right knee scope   • Breast surgery Left     left mastectomy   •  section, classic  x2    1962, 1964   • Cholecystectomy  1966   • Eye surgery  approx     bilateral cataract surgery    • Mammo screening bilateral  2012   • Removal gallbladder     • Robotic assisted hysterectomy  2017    RA TLH BSO per Fransico       Family History:  Family History   Problem Relation Age of Onset   • Cancer, Colon Mother    • Heart disease Father    • Vision Loss Brother    • Diabetes Sister    • Cancer Sister         unknown type   • Dementia/Alzheimers Sister         Alzheimers   • Vision Loss Brother    • Cancer, Breast Neg Hx    • Cancer, Ovarian Neg Hx    • Cancer, Endometrial Neg Hx        Social History:  Social History     Tobacco Use   • Smoking status: Former   • Smokeless tobacco: Never   • Tobacco comments:     35+ years ago   Substance Use Topics   • Alcohol use: No       REVIEW OF SYSTEMS     Review of Systems   Constitutional: Negative.    HENT: Negative.    Eyes: Negative.    Respiratory: Negative.  Negative for shortness of breath.    Cardiovascular: Negative.  Negative for chest pain.   Gastrointestinal: Negative.    Genitourinary: Negative.    Musculoskeletal: Positive for arthralgias and myalgias.   Neurological: Positive for headaches.   Psychiatric/Behavioral: Negative.        PHYSICAL EXAM     Physical Exam  Vitals and nursing note reviewed.   Constitutional:       Appearance: Normal appearance.   HENT:      Head: Normocephalic.      Nose: Nose normal.      Neck: Normal range of motion and neck supple.   Eyes:      Extraocular Movements: Extraocular movements intact.      Conjunctiva/sclera: Conjunctivae normal.      Pupils: Pupils are equal, round, and reactive to light.   Cardiovascular:      Rate and Rhythm:  Normal rate and regular rhythm.   Pulmonary:      Effort: Pulmonary effort is normal.      Breath sounds: Normal breath sounds.   Abdominal:      General: Abdomen is flat. Bowel sounds are normal.      Palpations: Abdomen is soft.   Musculoskeletal:         General: Normal range of motion.      Right forearm: Normal.      Left forearm: Tenderness and bony tenderness present.   Skin:     General: Skin is warm and dry.   Neurological:      General: No focal deficit present.      Mental Status: She is alert and oriented to person, place, and time.   Psychiatric:         Mood and Affect: Mood normal.         Behavior: Behavior normal.         Thought Content: Thought content normal.         Judgment: Judgment normal.         ASSESSMENT/PLAN   Diagnoses and associated orders for this visit:  1. ASHD (arteriosclerotic heart disease)  -     Electrocardiogram (ECG)  2. Essential (primary) hypertension  Assessment & Plan:  Monitor: The problem is worsening.  Evaluation: No labs/tests required today.  Assessment/Treatment:  Update treatment regimen per encounter summary    Orders:  -     Electrocardiogram (ECG)  3. Left arm pain  -     Electrocardiogram (ECG)  4. Need for vaccination  -     PNEUMOCOCCAL CONJUGATE 20 VALENT VACC (PREVNAR-20)  5. Hyperlipidemia, mixed  Assessment & Plan:  Monitor: The problem is unchanged.  Evaluation: No labs/tests required today.  Assessment/Treatment:  Continue current treatment/monitoring regimen.       yes

## 2023-03-20 ENCOUNTER — APPOINTMENT (OUTPATIENT)
Dept: NEUROLOGY | Facility: CLINIC | Age: 48
End: 2023-03-20
Payer: MEDICAID

## 2023-03-20 PROCEDURE — 96138 PSYCL/NRPSYC TECH 1ST: CPT

## 2023-03-20 PROCEDURE — 96133 NRPSYC TST EVAL PHYS/QHP EA: CPT

## 2023-03-20 PROCEDURE — 96139 PSYCL/NRPSYC TST TECH EA: CPT

## 2023-03-20 PROCEDURE — 96132 NRPSYC TST EVAL PHYS/QHP 1ST: CPT

## 2023-03-20 PROCEDURE — 90791 PSYCH DIAGNOSTIC EVALUATION: CPT

## 2023-03-24 ENCOUNTER — NON-APPOINTMENT (OUTPATIENT)
Age: 48
End: 2023-03-24

## 2023-03-27 ENCOUNTER — NON-APPOINTMENT (OUTPATIENT)
Age: 48
End: 2023-03-27

## 2023-03-28 ENCOUNTER — TRANSCRIPTION ENCOUNTER (OUTPATIENT)
Age: 48
End: 2023-03-28

## 2023-03-29 ENCOUNTER — APPOINTMENT (OUTPATIENT)
Dept: INFECTIOUS DISEASE | Facility: CLINIC | Age: 48
End: 2023-03-29
Payer: MEDICAID

## 2023-03-29 DIAGNOSIS — N48.22 CELLULITIS OF CORPUS CAVERNOSUM AND PENIS: ICD-10-CM

## 2023-03-29 PROCEDURE — 99213 OFFICE O/P EST LOW 20 MIN: CPT | Mod: 95

## 2023-03-29 NOTE — REASON FOR VISIT
[Follow-Up: _____] : a [unfilled] follow-up visit [Medical Office: ([facility])___] : at the medical office located in  [Other:____] : [unfilled] [Patient] : the patient [Self] : self

## 2023-03-29 NOTE — HISTORY OF PRESENT ILLNESS
[FreeTextEntry1] : 48 year old male with HIV, hx recurrent cellulitis recently admitted to [facility] in [place] for penile infection. He had given himself a Trimix injection the night prior to leaving for [place]. Penis became swollen and painful to the point he could barely walk. Was seen by urology and ID at [facility]. He was given vanco/pip-tazo while inpatient then discharged on clindamycin and levofloxacin x 1 week. Swelling, pain and induration has all improved.

## 2023-04-13 ENCOUNTER — APPOINTMENT (OUTPATIENT)
Dept: INFECTIOUS DISEASE | Facility: CLINIC | Age: 48
End: 2023-04-13

## 2023-04-19 ENCOUNTER — APPOINTMENT (OUTPATIENT)
Dept: NEUROLOGY | Facility: CLINIC | Age: 48
End: 2023-04-19
Payer: MEDICAID

## 2023-04-19 PROCEDURE — 96133 NRPSYC TST EVAL PHYS/QHP EA: CPT | Mod: 95

## 2023-04-21 ENCOUNTER — TRANSCRIPTION ENCOUNTER (OUTPATIENT)
Age: 48
End: 2023-04-21

## 2023-04-21 DIAGNOSIS — L03.90 CELLULITIS, UNSPECIFIED: ICD-10-CM

## 2023-05-15 ENCOUNTER — APPOINTMENT (OUTPATIENT)
Age: 48
End: 2023-05-15
Payer: MEDICAID

## 2023-05-15 VITALS
HEART RATE: 88 BPM | WEIGHT: 184 LBS | BODY MASS INDEX: 27.25 KG/M2 | DIASTOLIC BLOOD PRESSURE: 94 MMHG | TEMPERATURE: 98.2 F | RESPIRATION RATE: 16 BRPM | SYSTOLIC BLOOD PRESSURE: 144 MMHG | OXYGEN SATURATION: 100 % | HEIGHT: 69 IN
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
## 2023-05-15 PROCEDURE — 99215 OFFICE O/P EST HI 40 MIN: CPT

## 2023-05-15 RX ORDER — CEFADROXIL 500 MG/1
500 CAPSULE ORAL TWICE DAILY
Qty: 14 | Refills: 0 | Status: DISCONTINUED | COMMUNITY
Start: 2023-04-21 | End: 2023-05-15

## 2023-05-15 RX ORDER — DOXYCYCLINE 100 MG/1
100 TABLET, FILM COATED ORAL
Qty: 14 | Refills: 0 | Status: DISCONTINUED | COMMUNITY
Start: 2023-04-21 | End: 2023-05-15

## 2023-05-15 RX ORDER — SERTRALINE HYDROCHLORIDE 50 MG/1
50 TABLET, FILM COATED ORAL DAILY
Qty: 90 | Refills: 1 | Status: ACTIVE | COMMUNITY
Start: 2023-05-15 | End: 1900-01-01

## 2023-05-15 NOTE — PHYSICAL EXAM
[FreeTextEntry1] : General: this is a pleasant patient in no acute distress\par \par HEENT conjuntiva are normal\par \par Mental status:\par Alert and oriented to person, place and time\par Memory is normal \par Language is normal \par \par Head:\par Palpation of cranium and jaw: ok\par Occipital nerve tenderness: ok\par \par Cervical Spine:\par Palpation: ok\par Lateral Rotation: ok\par Lateral Flexion: ok\par Flexion + rotation: ok\par \par Eye movements:\par extra-occular movements in tact without nystagmus\par Saccades: ok\par Smooth Pursuit: ok\par Visually enhanced VOR: ok\par Near point of convergence: 11cm\par \par Gait: stable, able to tip toe heel and tandem\par \par Stances:\par Romberg: stable\par Shapened romberg: stable with mild sway\par \par right shoulder pain limited, sensation to temp and LT decreased whole right arm, pain with passive right shoulder ROM

## 2023-05-15 NOTE — CONSULT LETTER
[Dear  ___] : Dear  [unfilled], [Please see my note below.] : Please see my note below. [Sincerely,] : Sincerely, [Referral Letter:] : I am referring [unfilled] to you for further evaluation.  My most recent evaluation follows. [Referral Closing:] : Thank you very much for seeing this patient.  If you have any questions, please do not hesitate to contact me. [FreeTextEntry3] : Dmitri Prado MD

## 2023-05-15 NOTE — HISTORY OF PRESENT ILLNESS
[FreeTextEntry1] : ILIANA FINLEY is a 48 year who returns to follow up for brain injury\par \par last visit was 12/12/2022 when we continued zoloft despite it causing ED.  he also had started meditation.\par \par since last visit he had neuropsych testing with Dr Saleh\par \par neuropsych testing reviewed and functioning was relatively good except for frontal and subcortical inefficiencies. \par \par also fell down a flight of stairs recently, did hit head. was off balance initially. happened in the setting of leavign home, putting on bag, sunglasses and then slipped on the top step. has right shoulder pain since then. increased neck pain since then.  has had some headaches. +photophobia most of the time, + phonophobia most of the time. more irritated by children making noise outside.  reading seems okay.  memory has been a bit worse.\par \par  in retrospect he has noted some balance issues. he falls once weekly. \par \par still on sertaline 25mg daily\par \par copied from Neuropsych report by Dr Saleh 4/19/2023\par \par RESULTS & FORMULATION:\par Patient is a 48-year-old male with a history of cognitive and mood issues and hypoxic injury secondary to accidental fentanyl overdose. He presented for evaluation of his cognitive and behavioral functioning. \par •	Conceptual abilities are WNL consistent with estimated premorbid functioning.\par •	Total score on a cognitive screener is WNL. \par •	Basic attention was variable, although he demonstrated adequate capacity. Working memory was strong. \par •	Processing speed was highly variable and ranged from impaired to WNL.\par •	Executive functions were intact. \par •	Language abilities were strong. \par •	Visuospatial processing was WNL. \par •	Memory was consistently strong for learning, recall, and recognition of verbal and nonverbal information. \par •	On self-report measures, mild symptoms of depression and moderate symptoms of anxiety were reported. On interview, significant depression was endorsed. \par •	 In sum, Mr. Finley is largely doing very well from a cognitive perspective and has several areas of strength including working memory, language functions, and memory. There was variability in his attentional capacity and processing speed; importantly, he demonstrated capacity for intact functioning in these domains. \par •	The current profile is consistent with frontal-subcortical inefficiencies and is highly consistent with the effects of mood (depression and anxiety) on cognitive functioning. Severe mood symptoms are likely impeding his ability to function optimally in his daily life. It will be important for him to seek treatment, and with improvement in mood he will likely see improved cognitive functioning. \par •	Careful monitoring of his cognitive, behavioral, and psychological health is warranted to identify changes in functioning over time. \par \par RECOMMENDATIONS:\par \par 1.	Continued monitoring and treatment with Dr. Prado\par \par 2.	It is strongly recommended that Mr. Finley seek treatment mood symptoms through psychotherapy and pharmacotherapy. This will likely provide significant benefit to his overall well-being and cognition, particularly as emotional factors are believed to be at the root of his cognitive difficulties. Psychotherapy recommendations were provided at the time of his evaluation. Options for pharmacological treatment of mood can be discussed with Dr. Prado. \par \par 3.	Continued prioritization of healthy diet, sleep, and exercise is important for promoting physical, cognitive, and emotional wellness.\par \par 4.	Neuropsychological re-evaluation can be considered if symptoms change, persist, or worsen, or if additional diagnostic clarity otherwise becomes desired. We now have a patient-specific baseline from which to measure future change.\par \par Thank you for allowing me to participate in Mr. Finley’s care. Please don’t hesitate to contact if you have any questions. \par

## 2023-05-24 ENCOUNTER — APPOINTMENT (OUTPATIENT)
Dept: INFECTIOUS DISEASE | Facility: CLINIC | Age: 48
End: 2023-05-24

## 2023-05-30 ENCOUNTER — OUTPATIENT (OUTPATIENT)
Dept: OUTPATIENT SERVICES | Facility: HOSPITAL | Age: 48
LOS: 1 days | End: 2023-05-30

## 2023-05-30 ENCOUNTER — APPOINTMENT (OUTPATIENT)
Dept: MRI IMAGING | Facility: CLINIC | Age: 48
End: 2023-05-30
Payer: MEDICAID

## 2023-05-30 PROCEDURE — 72141 MRI NECK SPINE W/O DYE: CPT | Mod: 26

## 2023-06-05 ENCOUNTER — EMERGENCY (EMERGENCY)
Facility: HOSPITAL | Age: 48
LOS: 1 days | Discharge: HOME | End: 2023-06-05
Attending: EMERGENCY MEDICINE | Admitting: EMERGENCY MEDICINE
Payer: MEDICAID

## 2023-06-05 VITALS
RESPIRATION RATE: 18 BRPM | HEIGHT: 69 IN | TEMPERATURE: 98 F | DIASTOLIC BLOOD PRESSURE: 94 MMHG | SYSTOLIC BLOOD PRESSURE: 134 MMHG | WEIGHT: 177.91 LBS | HEART RATE: 99 BPM | OXYGEN SATURATION: 96 %

## 2023-06-05 VITALS
HEART RATE: 88 BPM | DIASTOLIC BLOOD PRESSURE: 104 MMHG | RESPIRATION RATE: 16 BRPM | TEMPERATURE: 98 F | OXYGEN SATURATION: 98 % | SYSTOLIC BLOOD PRESSURE: 155 MMHG

## 2023-06-05 PROCEDURE — 99283 EMERGENCY DEPT VISIT LOW MDM: CPT

## 2023-06-05 RX ORDER — IBUPROFEN 200 MG
400 TABLET ORAL ONCE
Refills: 0 | Status: COMPLETED | OUTPATIENT
Start: 2023-06-05 | End: 2023-06-05

## 2023-06-05 RX ADMIN — Medication 400 MILLIGRAM(S): at 20:39

## 2023-06-05 RX ADMIN — Medication 1 TABLET(S): at 20:39

## 2023-06-05 RX ADMIN — Medication 100 MILLIGRAM(S): at 20:39

## 2023-06-05 NOTE — ED PROVIDER NOTE - OBJECTIVE STATEMENT
Patient reports that he had a chair fall on his left leg 1 week ago.  Pain was minimal at start developed a painful bump a few days later that is continued to get worse with surrounding redness.  Yesterday he was scratched on left hand by his cat.  Today the hand is red and swollen.  Patient is HIV positive, on antivirals, last viral load undetectable.No fever, chest pain, shortness of breath, focal weakness, paresthesias, drainage

## 2023-06-05 NOTE — ED PROVIDER NOTE - PHYSICAL EXAMINATION
Gen: Well-developed, well-nourished, NAD, VS as noted by nursing. HEENT: NCAT, mmm   Chest: RRR, nl S1 and S2, no m/r/g. Resp: CTAB, no w/r/r  Abd: nl BS, soft, nt/nd. Ext: Warm, dry  Neuro: CN II-XII intact, normal and equal strength, sensation, and reflexes bilaterally, normal gait  Psych: AAOx3   Skin: Left hand: superficial abrasion to dorsal aspect of hand with surrounding edema and erythema, sparing fingers. FROM. neurovascular and tendon intact distal to injury. Left lower leg: anterior 2x2cm hematoma with tenderness, warmth, 5x5cm surrounding cellulitis. neurovascular and tendon intact distal to injury

## 2023-06-05 NOTE — ED PROVIDER NOTE - PATIENT PORTAL LINK FT
You can access the FollowMyHealth Patient Portal offered by [facility] by registering at the following website: [email or web address]. By joining [email or web address]’s FollowMyHealth portal, you will also be able to view your health information using other applications (apps) compatible with our system.

## 2023-06-05 NOTE — ED PROVIDER NOTE - CLINICAL SUMMARY MEDICAL DECISION MAKING FREE TEXT BOX
Patient with infected hematoma to left leg with surrounding cellulitis, incision and drainage performed. also infected cat bite to left hand. No evidence of tenosynovitis. will tx with antibiotics, augmentin for cat scratch and doxycycline for MRSA coverage. Return in 2 days for wound check. Return sooner if redness spreads.

## 2023-06-05 NOTE — ED ADULT TRIAGE NOTE - CHIEF COMPLAINT QUOTE
left lower leg pain, swelling and redness- also reports his cat scratched his left hand yesterday which is now, painful red and swollen- h/o HIV

## 2023-06-07 ENCOUNTER — EMERGENCY (EMERGENCY)
Facility: HOSPITAL | Age: 48
LOS: 1 days | Discharge: HOME | End: 2023-06-07
Attending: EMERGENCY MEDICINE | Admitting: EMERGENCY MEDICINE
Payer: MEDICAID

## 2023-06-07 VITALS
TEMPERATURE: 98 F | SYSTOLIC BLOOD PRESSURE: 148 MMHG | HEART RATE: 96 BPM | RESPIRATION RATE: 16 BRPM | DIASTOLIC BLOOD PRESSURE: 100 MMHG | WEIGHT: 179.9 LBS | OXYGEN SATURATION: 98 % | HEIGHT: 69 IN

## 2023-06-07 VITALS
TEMPERATURE: 99 F | SYSTOLIC BLOOD PRESSURE: 130 MMHG | HEART RATE: 81 BPM | RESPIRATION RATE: 18 BRPM | OXYGEN SATURATION: 97 % | DIASTOLIC BLOOD PRESSURE: 88 MMHG

## 2023-06-07 LAB
ALBUMIN SERPL ELPH-MCNC: 3.2 G/DL — LOW (ref 3.4–5)
ALP SERPL-CCNC: 83 U/L — SIGNIFICANT CHANGE UP (ref 40–120)
ALT FLD-CCNC: 23 U/L — SIGNIFICANT CHANGE UP (ref 12–42)
ANION GAP SERPL CALC-SCNC: 9 MMOL/L — SIGNIFICANT CHANGE UP (ref 9–16)
AST SERPL-CCNC: 14 U/L — LOW (ref 15–37)
BASOPHILS # BLD AUTO: 0.01 K/UL — SIGNIFICANT CHANGE UP (ref 0–0.2)
BASOPHILS NFR BLD AUTO: 0.1 % — SIGNIFICANT CHANGE UP (ref 0–2)
BILIRUB SERPL-MCNC: 0.3 MG/DL — SIGNIFICANT CHANGE UP (ref 0.2–1.2)
BUN SERPL-MCNC: 22 MG/DL — SIGNIFICANT CHANGE UP (ref 7–23)
CALCIUM SERPL-MCNC: 8.5 MG/DL — SIGNIFICANT CHANGE UP (ref 8.5–10.5)
CHLORIDE SERPL-SCNC: 105 MMOL/L — SIGNIFICANT CHANGE UP (ref 96–108)
CO2 SERPL-SCNC: 24 MMOL/L — SIGNIFICANT CHANGE UP (ref 22–31)
CREAT SERPL-MCNC: 1.06 MG/DL — SIGNIFICANT CHANGE UP (ref 0.5–1.3)
EGFR: 86 ML/MIN/1.73M2 — SIGNIFICANT CHANGE UP
EOSINOPHIL # BLD AUTO: 0.21 K/UL — SIGNIFICANT CHANGE UP (ref 0–0.5)
EOSINOPHIL NFR BLD AUTO: 3.1 % — SIGNIFICANT CHANGE UP (ref 0–6)
GLUCOSE SERPL-MCNC: 104 MG/DL — HIGH (ref 70–99)
HCT VFR BLD CALC: 40.2 % — SIGNIFICANT CHANGE UP (ref 39–50)
HGB BLD-MCNC: 13.7 G/DL — SIGNIFICANT CHANGE UP (ref 13–17)
IMM GRANULOCYTES NFR BLD AUTO: 0.1 % — SIGNIFICANT CHANGE UP (ref 0–0.9)
LYMPHOCYTES # BLD AUTO: 2.56 K/UL — SIGNIFICANT CHANGE UP (ref 1–3.3)
LYMPHOCYTES # BLD AUTO: 38.2 % — SIGNIFICANT CHANGE UP (ref 13–44)
MCHC RBC-ENTMCNC: 29.2 PG — SIGNIFICANT CHANGE UP (ref 27–34)
MCHC RBC-ENTMCNC: 34.1 GM/DL — SIGNIFICANT CHANGE UP (ref 32–36)
MCV RBC AUTO: 85.7 FL — SIGNIFICANT CHANGE UP (ref 80–100)
MONOCYTES # BLD AUTO: 0.68 K/UL — SIGNIFICANT CHANGE UP (ref 0–0.9)
MONOCYTES NFR BLD AUTO: 10.1 % — SIGNIFICANT CHANGE UP (ref 2–14)
NEUTROPHILS # BLD AUTO: 3.24 K/UL — SIGNIFICANT CHANGE UP (ref 1.8–7.4)
NEUTROPHILS NFR BLD AUTO: 48.4 % — SIGNIFICANT CHANGE UP (ref 43–77)
NRBC # BLD: 0 /100 WBCS — SIGNIFICANT CHANGE UP (ref 0–0)
PLATELET # BLD AUTO: 233 K/UL — SIGNIFICANT CHANGE UP (ref 150–400)
POTASSIUM SERPL-MCNC: 3.8 MMOL/L — SIGNIFICANT CHANGE UP (ref 3.5–5.3)
POTASSIUM SERPL-SCNC: 3.8 MMOL/L — SIGNIFICANT CHANGE UP (ref 3.5–5.3)
PROT SERPL-MCNC: 6.8 G/DL — SIGNIFICANT CHANGE UP (ref 6.4–8.2)
RBC # BLD: 4.69 M/UL — SIGNIFICANT CHANGE UP (ref 4.2–5.8)
RBC # FLD: 13.1 % — SIGNIFICANT CHANGE UP (ref 10.3–14.5)
SODIUM SERPL-SCNC: 138 MMOL/L — SIGNIFICANT CHANGE UP (ref 132–145)
WBC # BLD: 6.71 K/UL — SIGNIFICANT CHANGE UP (ref 3.8–10.5)
WBC # FLD AUTO: 6.71 K/UL — SIGNIFICANT CHANGE UP (ref 3.8–10.5)

## 2023-06-07 PROCEDURE — 10060 I&D ABSCESS SIMPLE/SINGLE: CPT

## 2023-06-07 PROCEDURE — 99235 HOSP IP/OBS SAME DATE MOD 70: CPT | Mod: 25

## 2023-06-07 RX ORDER — AMPICILLIN SODIUM AND SULBACTAM SODIUM 250; 125 MG/ML; MG/ML
1.5 INJECTION, POWDER, FOR SUSPENSION INTRAMUSCULAR; INTRAVENOUS ONCE
Refills: 0 | Status: COMPLETED | OUTPATIENT
Start: 2023-06-07 | End: 2023-06-07

## 2023-06-07 RX ORDER — VANCOMYCIN HCL 1 G
1250 VIAL (EA) INTRAVENOUS ONCE
Refills: 0 | Status: COMPLETED | OUTPATIENT
Start: 2023-06-07 | End: 2023-06-07

## 2023-06-07 RX ORDER — KETOROLAC TROMETHAMINE 30 MG/ML
15 SYRINGE (ML) INJECTION ONCE
Refills: 0 | Status: DISCONTINUED | OUTPATIENT
Start: 2023-06-07 | End: 2023-06-07

## 2023-06-07 RX ORDER — VANCOMYCIN HCL 1 G
1250 VIAL (EA) INTRAVENOUS ONCE
Refills: 0 | Status: DISCONTINUED | OUTPATIENT
Start: 2023-06-07 | End: 2023-06-07

## 2023-06-07 RX ADMIN — Medication 166.67 MILLIGRAM(S): at 18:35

## 2023-06-07 RX ADMIN — Medication 15 MILLIGRAM(S): at 08:22

## 2023-06-07 RX ADMIN — Medication 15 MILLIGRAM(S): at 05:37

## 2023-06-07 RX ADMIN — AMPICILLIN SODIUM AND SULBACTAM SODIUM 100 GRAM(S): 250; 125 INJECTION, POWDER, FOR SUSPENSION INTRAMUSCULAR; INTRAVENOUS at 10:59

## 2023-06-07 RX ADMIN — Medication 1250 MILLIGRAM(S): at 07:22

## 2023-06-07 RX ADMIN — Medication 166.67 MILLIGRAM(S): at 05:49

## 2023-06-07 RX ADMIN — AMPICILLIN SODIUM AND SULBACTAM SODIUM 1.5 GRAM(S): 250; 125 INJECTION, POWDER, FOR SUSPENSION INTRAMUSCULAR; INTRAVENOUS at 11:07

## 2023-06-07 RX ADMIN — AMPICILLIN SODIUM AND SULBACTAM SODIUM 1.5 GRAM(S): 250; 125 INJECTION, POWDER, FOR SUSPENSION INTRAMUSCULAR; INTRAVENOUS at 07:22

## 2023-06-07 RX ADMIN — AMPICILLIN SODIUM AND SULBACTAM SODIUM 200 GRAM(S): 250; 125 INJECTION, POWDER, FOR SUSPENSION INTRAMUSCULAR; INTRAVENOUS at 17:24

## 2023-06-07 RX ADMIN — AMPICILLIN SODIUM AND SULBACTAM SODIUM 100 GRAM(S): 250; 125 INJECTION, POWDER, FOR SUSPENSION INTRAMUSCULAR; INTRAVENOUS at 05:00

## 2023-06-07 NOTE — ED ADULT NURSE NOTE - NSFALLRISKINTERV_ED_ALL_ED

## 2023-06-07 NOTE — ED ADULT TRIAGE NOTE - CHIEF COMPLAINT QUOTE
Pt. walk in for wound to L. Lower [place]. Pt. was seen here last week for an abscess that was drained. Pt. is back because the area around the wound is now red, swollen, and painful.

## 2023-06-07 NOTE — ED PROVIDER NOTE - CLINICAL SUMMARY MEDICAL DECISION MAKING FREE TEXT BOX
well appearing pt here with worsening cellulitis of the LLE shin with ttp along the shin with mild erythema on exam, pt finished 1 abx therapy given worsening symptoms will keep pt in the ED for IV abx therapy, plan: vanco, amp

## 2023-06-07 NOTE — ED PROVIDER NOTE - ATTENDING APP SHARED VISIT CONTRIBUTION OF CARE
Patient presents with left lower extremity anterior tib-fib erythema warmth consistent with cellulitis after a puncture wound.  Patient was seen 24 hours ago and advised to return to the ER for wound check at which time he was started on doxycycline and Augmentin.  Patient will require IV antibiotics and observation I agree with management and documentation.  Patient is aware that he has been placed in observation

## 2023-06-07 NOTE — ED CDU PROVIDER DISPOSITION NOTE - PATIENT PORTAL LINK FT
You can access the FollowMyHealth Patient Portal offered by [facility] by registering at the following website: [email or web address]. By joining [employer]’s FollowMyHealth portal, you will also be able to view your health information using other applications (apps) compatible with our system.

## 2023-06-07 NOTE — ED PROVIDER NOTE - OBJECTIVE STATEMENT
47 yo m pmhx sig for HIV (viral count undetectable) returns to the ED with worsening pain at the infected hematoma site over the L shin after 1 d of abx therapy reports worsening pain at the site, with redness spreading along the L shin. No fevers or chills.    I have reviewed available current nursing and previous documentation of past medical, surgical, family, and/or social history.

## 2023-06-07 NOTE — ED CDU PROVIDER INITIAL DAY NOTE - PROGRESS NOTE DETAILS
redness improving. pt notes swollen central area which was previously I&D'd and he is wondering whether it needs additional I&D. I&D performed at bedside without purulence.

## 2023-06-07 NOTE — ED CDU PROVIDER INITIAL DAY NOTE - CLINICAL SUMMARY MEDICAL DECISION MAKING FREE TEXT BOX
pt here with L shin cellulitis worsening after 1 of PO abx therapy will keep pt in the ED for IV abx therapy and dc home after reeval

## 2023-06-07 NOTE — ED ADULT NURSE NOTE - OBJECTIVE STATEMENT
49 y/o male here for eval of cellulitis of left leg. patient reports he had a mechanical fall where his left leg collided with a bar stool. patient is alert verbal oriented x 3 able to make needs known

## 2023-06-07 NOTE — ED PROVIDER NOTE - NS ED ROS FT
Review of Systems    Constitutional: (-) fever  Eyes/ENT: (-) change in vision, (-) sore throat, (-) ear pain  Cardiovascular: (-) chest pain, (-) palpitation   Respiratory: (-) cough, (-) shortness of breath  Gastrointestinal: (-) abdominal pain (-) vomiting, (-) diarrhea  Musculoskeletal: (-) neck pain, (-) back pain, (-) joint pain  Integumentary: (-) edema  Neurological: (-) headache, (-) altered mental status  Heme/Lymph: (-) easy bruising (-) easy bleeding  Allergic/Immunologic: (-) pruritus

## 2023-06-07 NOTE — ED ADULT NURSE REASSESSMENT NOTE - NS ED NURSE REASSESS COMMENT FT1
rec'd report from CHRISTAL Martino. Pt sleeping comfortably- next dose of abx is at 11- will monitor closely

## 2023-06-07 NOTE — ED PROVIDER NOTE - PHYSICAL EXAMINATION
Physical Exam    Vital Signs: I have reviewed the initial vital signs.  Constitutional: well-appearing, appears stated age  Eyes: PERRLA, and symmetrical lids.  ENT: Neck supple with no adenopathy, moist MM.  Cardiovascular: regular rate, regular rhythm, well-perfused extremities, DP pulse +2 and equal b/l  Respiratory: unlabored respiratory effort, clear to auscultation bilaterally  Gastrointestinal: soft, non-tender abdomen, no pulsatile mass  Musculoskeletal: supple neck, no lower extremity edema  Integumentary: warm, dry, +L shin erythema with ttp and induration w/o fluctuance, no crepitus   Neurologic: extremities’ motor and sensory functions grossly intact  Psychiatric: A&Ox3

## 2023-06-09 DIAGNOSIS — S61.452A OPEN BITE OF LEFT HAND, INITIAL ENCOUNTER: ICD-10-CM

## 2023-06-09 DIAGNOSIS — M79.662 PAIN IN LEFT LOWER LEG: ICD-10-CM

## 2023-06-09 DIAGNOSIS — R21 RASH AND OTHER NONSPECIFIC SKIN ERUPTION: ICD-10-CM

## 2023-06-09 DIAGNOSIS — L02.416 CUTANEOUS ABSCESS OF LEFT LOWER LIMB: ICD-10-CM
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
## 2023-06-09 DIAGNOSIS — Z21 ASYMPTOMATIC HUMAN IMMUNODEFICIENCY VIRUS [HIV] INFECTION STATUS: ICD-10-CM

## 2023-06-09 DIAGNOSIS — I10 ESSENTIAL (PRIMARY) HYPERTENSION: ICD-10-CM
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
## 2023-06-09 DIAGNOSIS — B20 HUMAN IMMUNODEFICIENCY VIRUS [HIV] DISEASE: ICD-10-CM

## 2023-06-09 DIAGNOSIS — L03.116 CELLULITIS OF LEFT LOWER LIMB: ICD-10-CM

## 2023-06-09 DIAGNOSIS — F32.A DEPRESSION, UNSPECIFIED: ICD-10-CM

## 2023-06-09 DIAGNOSIS — L03.114 CELLULITIS OF LEFT UPPER LIMB: ICD-10-CM

## 2023-06-16 ENCOUNTER — TRANSCRIPTION ENCOUNTER (OUTPATIENT)
Age: 48
End: 2023-06-16

## 2023-06-19 ENCOUNTER — TRANSCRIPTION ENCOUNTER (OUTPATIENT)
Age: 48
End: 2023-06-19

## 2023-06-20 ENCOUNTER — TRANSCRIPTION ENCOUNTER (OUTPATIENT)
Age: 48
End: 2023-06-20

## 2023-06-25 ENCOUNTER — EMERGENCY (EMERGENCY)
Facility: HOSPITAL | Age: 48
LOS: 1 days | Discharge: HOME | End: 2023-06-25
Attending: EMERGENCY MEDICINE | Admitting: EMERGENCY MEDICINE
Payer: MEDICAID

## 2023-06-25 VITALS
DIASTOLIC BLOOD PRESSURE: 90 MMHG | RESPIRATION RATE: 18 BRPM | HEART RATE: 101 BPM | TEMPERATURE: 97 F | HEIGHT: 69 IN | SYSTOLIC BLOOD PRESSURE: 150 MMHG | OXYGEN SATURATION: 98 %

## 2023-06-25 DIAGNOSIS — L03.116 CELLULITIS OF LEFT LOWER LIMB: ICD-10-CM

## 2023-06-25 PROCEDURE — 99283 EMERGENCY DEPT VISIT LOW MDM: CPT

## 2023-06-25 NOTE — ED ADULT TRIAGE NOTE - BP NONINVASIVE SYSTOLIC (MM HG)
Patient attended session 1 of Cardiac Rehab Phase 2. See scanned in exercise session report in the Media tab.    
150

## 2023-06-25 NOTE — ED ADULT NURSE NOTE - NSFALLUNIVINTERV_ED_ALL_ED
Bed/Stretcher in lowest position, wheels locked, appropriate side rails in place/Call bell, personal items and telephone in reach/Instruct patient to call for assistance before getting out of bed/chair/stretcher/Non-slip footwear applied when patient is off stretcher/[place] to call system/Physically safe environment - no spills, clutter or unnecessary equipment/Purposeful proactive rounding/Room/bathroom lighting operational, light cord in reach

## 2023-06-25 NOTE — ED PROVIDER NOTE - PATIENT PORTAL LINK FT
You can access the FollowMyHealth Patient Portal offered by [facility] by registering at the following website: [email or web address]. By joining [employer]’s FollowMyHealth portal, you will also be able to view your health information using other applications (apps) compatible with our system.

## 2023-06-25 NOTE — ED PROVIDER NOTE - EXTREMITY EXAM
Lower anterior tib-fib region with 1 cm circular region of scant ulceration healing well with overlying scab with surrounding darkish skin discoloration but no erythema no increased warmth no crepitus fluctuance or induration.  No active drainage.  No streaking./left lower extremity findings
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
## 2023-06-25 NOTE — ED PROVIDER NOTE - CARE PROVIDER_API CALL
Tony Ayala  Infectious Disease  [address] [place] [address]  Phone: [phone]  Fax: [phone]  Follow Up Time:

## 2023-06-25 NOTE — ED PROVIDER NOTE - CLINICAL SUMMARY MEDICAL DECISION MAKING FREE TEXT BOX
Patient with history of left lower leg anterior tib-fib cellulitis presents for wound check, region is healing well with no active or acute signs of infection.  I do not suspect this patient needs another course of antibiotics as he recently completed a course of a week ago.  We will continue with Hibiclens washes at home and wound care and is seeing his infectious disease doctor in 2 days for follow-up Dr. Ayala advised strict return if the region worsens

## 2023-06-25 NOTE — ED ADULT TRIAGE NOTE - CHIEF COMPLAINT QUOTE
Walk in pt requesting wound check to left shin. States he had an abscess there 'a while ago' as well as cellulitis. States he feels the wound isnt healing.
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
## 2023-06-26 ENCOUNTER — TRANSCRIPTION ENCOUNTER (OUTPATIENT)
Age: 48
End: 2023-06-26

## 2023-06-27 DIAGNOSIS — Z51.89 ENCOUNTER FOR OTHER SPECIFIED AFTERCARE: ICD-10-CM

## 2023-06-28 ENCOUNTER — APPOINTMENT (OUTPATIENT)
Dept: INFECTIOUS DISEASE | Facility: CLINIC | Age: 48
End: 2023-06-28

## 2023-07-03 ENCOUNTER — APPOINTMENT (OUTPATIENT)
Dept: GASTROENTEROLOGY | Facility: CLINIC | Age: 48
End: 2023-07-03

## 2023-07-05 ENCOUNTER — TRANSCRIPTION ENCOUNTER (OUTPATIENT)
Age: 48
End: 2023-07-05

## 2023-07-10 ENCOUNTER — APPOINTMENT (OUTPATIENT)
Dept: AFTER HOURS CARE | Facility: EMERGENCY ROOM | Age: 48
End: 2023-07-10
Payer: MEDICAID

## 2023-07-10 ENCOUNTER — NON-APPOINTMENT (OUTPATIENT)
Age: 48
End: 2023-07-10

## 2023-07-10 DIAGNOSIS — H10.32 UNSPECIFIED ACUTE CONJUNCTIVITIS, LEFT EYE: ICD-10-CM

## 2023-07-10 PROCEDURE — 99214 OFFICE O/P EST MOD 30 MIN: CPT

## 2023-07-10 PROCEDURE — 99204 OFFICE O/P NEW MOD 45 MIN: CPT | Mod: 95

## 2023-07-10 NOTE — PLAN
[With new medications prescribed] : Treat in place: with new medications prescribed [FreeTextEntry1] : rx eye drops\par infection control and other anticipatory guidance\par ED precautions incl pain w/EOM, visual changes\par pmd f/u

## 2023-07-10 NOTE — HISTORY OF PRESENT ILLNESS
[Home] : at home, [unfilled] , at the time of the visit. [Other Location: e.g. Home (Enter Location, City,State)___] : at [unfilled] [Verbal consent obtained from patient] : the patient, [unfilled] [FreeTextEntry8] : 48y M, hx OCD, anx/depression, HTN, hld, HIV, substance use, now p/w atraumatic L eye redness with yellow d/c. No\par URI sx. No fever. No visual acuity changes or pain w/EOM. St feels like there is gritty sand in the eye all the time. No\par photophobia.

## 2023-07-12 ENCOUNTER — APPOINTMENT (OUTPATIENT)
Dept: HEART AND VASCULAR | Facility: CLINIC | Age: 48
End: 2023-07-12

## 2023-07-13 ENCOUNTER — TRANSCRIPTION ENCOUNTER (OUTPATIENT)
Age: 48
End: 2023-07-13

## 2023-07-17 ENCOUNTER — NON-APPOINTMENT (OUTPATIENT)
Age: 48
End: 2023-07-17

## 2023-07-17 ENCOUNTER — APPOINTMENT (OUTPATIENT)
Dept: HEART AND VASCULAR | Facility: CLINIC | Age: 48
End: 2023-07-17
Payer: MEDICAID

## 2023-07-17 VITALS
WEIGHT: 186.56 LBS | OXYGEN SATURATION: 100 % | SYSTOLIC BLOOD PRESSURE: 172 MMHG | BODY MASS INDEX: 27.63 KG/M2 | TEMPERATURE: 97.5 F | HEART RATE: 105 BPM | HEIGHT: 69 IN | DIASTOLIC BLOOD PRESSURE: 113 MMHG

## 2023-07-17 VITALS — SYSTOLIC BLOOD PRESSURE: 155 MMHG | DIASTOLIC BLOOD PRESSURE: 90 MMHG

## 2023-07-17 PROCEDURE — 93000 ELECTROCARDIOGRAM COMPLETE: CPT

## 2023-07-17 PROCEDURE — 99214 OFFICE O/P EST MOD 30 MIN: CPT

## 2023-07-17 PROCEDURE — 99204 OFFICE O/P NEW MOD 45 MIN: CPT | Mod: 25

## 2023-07-17 NOTE — DISCUSSION/SUMMARY
[FreeTextEntry1] : SOB check a stress echo if able to approve and schedule.\par HTN cont losartan and add hctz\par HLD check Ca score\par EKG SR no ST T changes  [EKG obtained to assist in diagnosis and management of assessed problem(s)] : EKG obtained to assist in diagnosis and management of assessed problem(s)

## 2023-07-17 NOTE — REASON FOR VISIT
[Symptom and Test Evaluation] : symptom and test evaluation [FreeTextEntry1] : 48 year old man comes in for a visit. He has a history of HTN and FH of CAD. No chest pain noted. Hypertensive on arrival. Occasional dyspnea noted. This is often noted with activity. Symptoms always improve at rest. No recent cardiac work up.

## 2023-07-18 ENCOUNTER — TRANSCRIPTION ENCOUNTER (OUTPATIENT)
Age: 48
End: 2023-07-18

## 2023-07-19 ENCOUNTER — TRANSCRIPTION ENCOUNTER (OUTPATIENT)
Age: 48
End: 2023-07-19

## 2023-08-04 ENCOUNTER — TRANSCRIPTION ENCOUNTER (OUTPATIENT)
Age: 48
End: 2023-08-04

## 2023-08-07 ENCOUNTER — APPOINTMENT (OUTPATIENT)
Dept: FAMILY MEDICINE | Facility: CLINIC | Age: 48
End: 2023-08-07

## 2023-08-07 NOTE — ED ADULT NURSE NOTE - OBJECTIVE STATEMENT
Called radiology to request CT and US to be read that were completed on 8/3/23. Small bump about 1cm round noted to L. shin. No redness or swelling noted.

## 2023-08-11 ENCOUNTER — APPOINTMENT (OUTPATIENT)
Dept: FAMILY MEDICINE | Facility: CLINIC | Age: 48
End: 2023-08-11
Payer: MEDICAID

## 2023-08-11 VITALS
TEMPERATURE: 97.1 F | DIASTOLIC BLOOD PRESSURE: 87 MMHG | WEIGHT: 180.25 LBS | HEART RATE: 117 BPM | HEIGHT: 69 IN | BODY MASS INDEX: 26.7 KG/M2 | OXYGEN SATURATION: 100 % | SYSTOLIC BLOOD PRESSURE: 145 MMHG

## 2023-08-11 PROCEDURE — 99214 OFFICE O/P EST MOD 30 MIN: CPT | Mod: 1L,25

## 2023-08-11 PROCEDURE — XXXXX: CPT | Mod: 1L

## 2023-08-11 PROCEDURE — 36415 COLL VENOUS BLD VENIPUNCTURE: CPT | Mod: 1L

## 2023-08-11 NOTE — HISTORY OF PRESENT ILLNESS
[FreeTextEntry1] : bp check  chol follow up  [de-identified] : 47 yo m presents for bp check and chol follow up

## 2023-08-22 ENCOUNTER — HOSPITAL ENCOUNTER (EMERGENCY)
Dept: HOSPITAL 12 - ER | Age: 48
Discharge: HOME | End: 2023-08-22
Payer: COMMERCIAL

## 2023-08-22 VITALS — HEIGHT: 69 IN | WEIGHT: 180 LBS | BODY MASS INDEX: 26.66 KG/M2

## 2023-08-22 VITALS — TEMPERATURE: 98.2 F | SYSTOLIC BLOOD PRESSURE: 144 MMHG | OXYGEN SATURATION: 99 % | DIASTOLIC BLOOD PRESSURE: 92 MMHG

## 2023-08-22 DIAGNOSIS — Z79.899: ICD-10-CM

## 2023-08-22 DIAGNOSIS — L03.314: Primary | ICD-10-CM

## 2023-08-22 DIAGNOSIS — L98.499: ICD-10-CM

## 2023-08-22 LAB
ALBUMIN SERPL ELPH-MCNC: 4.5 G/DL
ALP BLD-CCNC: 102 U/L
ALT SERPL-CCNC: 27 U/L
ANION GAP SERPL CALC-SCNC: 12 MMOL/L
AST SERPL-CCNC: 26 U/L
BILIRUB SERPL-MCNC: 0.3 MG/DL
BUN SERPL-MCNC: 11 MG/DL
CALCIUM SERPL-MCNC: 10 MG/DL
CHLORIDE SERPL-SCNC: 102 MMOL/L
CHOLEST SERPL-MCNC: 254 MG/DL
CO2 SERPL-SCNC: 27 MMOL/L
CREAT SERPL-MCNC: 1.24 MG/DL
EGFR: 71 ML/MIN/1.73M2
ESTIMATED AVERAGE GLUCOSE: 111 MG/DL
GLUCOSE SERPL-MCNC: 78 MG/DL
HBA1C MFR BLD HPLC: 5.5 %
HDLC SERPL-MCNC: 66 MG/DL
LDLC SERPL CALC-MCNC: 174 MG/DL
NONHDLC SERPL-MCNC: 188 MG/DL
POTASSIUM SERPL-SCNC: 5.1 MMOL/L
PROT SERPL-MCNC: 8 G/DL
SODIUM SERPL-SCNC: 141 MMOL/L
TRIGL SERPL-MCNC: 85 MG/DL

## 2023-08-22 PROCEDURE — 96372 THER/PROPH/DIAG INJ SC/IM: CPT

## 2023-08-22 PROCEDURE — 99283 EMERGENCY DEPT VISIT LOW MDM: CPT

## 2023-08-22 PROCEDURE — A4663 DIALYSIS BLOOD PRESSURE CUFF: HCPCS

## 2023-08-23 ENCOUNTER — NON-APPOINTMENT (OUTPATIENT)
Age: 48
End: 2023-08-23

## 2023-09-01 ENCOUNTER — APPOINTMENT (OUTPATIENT)
Dept: NEUROLOGY | Facility: CLINIC | Age: 48
End: 2023-09-01
Payer: MEDICAID

## 2023-09-01 VITALS
WEIGHT: 180 LBS | HEART RATE: 101 BPM | TEMPERATURE: 98.2 F | HEIGHT: 69 IN | OXYGEN SATURATION: 99 % | DIASTOLIC BLOOD PRESSURE: 93 MMHG | BODY MASS INDEX: 26.66 KG/M2 | SYSTOLIC BLOOD PRESSURE: 138 MMHG | RESPIRATION RATE: 17 BRPM

## 2023-09-01 DIAGNOSIS — G56.01 CARPAL TUNNEL SYNDROME, RIGHT UPPER LIMB: ICD-10-CM

## 2023-09-01 DIAGNOSIS — S14.3XXA INJURY OF BRACHIAL PLEXUS, INITIAL ENCOUNTER: ICD-10-CM

## 2023-09-01 DIAGNOSIS — R20.0 ANESTHESIA OF SKIN: ICD-10-CM

## 2023-09-01 PROCEDURE — 95886 MUSC TEST DONE W/N TEST COMP: CPT

## 2023-09-01 PROCEDURE — 95911 NRV CNDJ TEST 9-10 STUDIES: CPT

## 2023-09-01 PROCEDURE — 99213 OFFICE O/P EST LOW 20 MIN: CPT | Mod: 25

## 2023-09-01 NOTE — ASSESSMENT
[FreeTextEntry1] : EMG/NCS of the right arm performed today showed moderate right CTS and mild right brachial plexopathy  EMG REPORT WILL BE UPLOADED SEPARATELY AS A PDF  of note, he will return next week to discuss recent fall as he was late to today's appointment and we could not accomplish evaluation of that

## 2023-09-01 NOTE — HISTORY OF PRESENT ILLNESS
[FreeTextEntry1] : ILIANA FINLEY is a 48 year who returns to follow up for brain injury and right arm numbness  last visit was 5/15/2023 when he had a fall and had right arm numbness so I referred for imaging and EMG.  at that time I also increased zoloft to 50mg and suggested cog therapy for frontal dysfunction  since last visit did have a fall and hit his head  having more symptoms at night in arm. arm more painful.  increased zoloft did help

## 2023-09-01 NOTE — DATA REVIEWED
[de-identified] : MRI C spine mild ot mod stenosis C5-C6 and mod stenosis C6-C7 worst foramen is R C6-C7

## 2023-09-01 NOTE — PHYSICAL EXAM
[FreeTextEntry1] : General: this is a pleasant patient in no acute distress HEENT conjuntiva are normal  Mental status: Alert and oriented to person, place and time Memory is normal Language is normal  Head: Palpation of cranium and jaw: ok Occipital nerve tenderness: ok  Cervical Spine: Palpation: ok Lateral Rotation: ok Lateral Flexion: ok Flexion + rotation: ok  Eye movements: extra-occular movements in tact without nystagmus Saccades: ok Smooth Pursuit: ok Visually enhanced VOR: ok Near point of convergence: 11cm  Gait: stable, able to tip toe heel and tandem  Stances: Romberg: stable Shapened romberg: stable with mild sway  right shoulder pain limited, sensation to temp and LT decreased whole right arm, pain with passive right shoulder ROM.

## 2023-09-05 ENCOUNTER — APPOINTMENT (OUTPATIENT)
Dept: NEUROLOGY | Facility: CLINIC | Age: 48
End: 2023-09-05
Payer: MEDICAID

## 2023-09-05 ENCOUNTER — APPOINTMENT (OUTPATIENT)
Dept: CT IMAGING | Facility: CLINIC | Age: 48
End: 2023-09-05

## 2023-09-05 VITALS
HEART RATE: 70 BPM | RESPIRATION RATE: 17 BRPM | BODY MASS INDEX: 26.66 KG/M2 | TEMPERATURE: 97.7 F | OXYGEN SATURATION: 98 % | DIASTOLIC BLOOD PRESSURE: 95 MMHG | SYSTOLIC BLOOD PRESSURE: 143 MMHG | WEIGHT: 180 LBS | HEIGHT: 69 IN

## 2023-09-05 DIAGNOSIS — R45.86 EMOTIONAL LABILITY: ICD-10-CM

## 2023-09-05 PROCEDURE — 99215 OFFICE O/P EST HI 40 MIN: CPT

## 2023-09-05 NOTE — HISTORY OF PRESENT ILLNESS
[FreeTextEntry1] : ILIANA FINLEY is a 48 year who returns to follow up for a new fall  last visit was 9/1 for EMG/NCS when we did not have a chance to discuss his recent fall.  he fell on 8/31  was weeding in garden in the sun. stood up and wasn't that dizzy but the soil was uneven and his left foot slipped and her fell forward breaking fall with arms but still hit his head on the cement and had a lot of neck pain right away.  had some dizziness and felt stunned afterwards plus headache for rest of afternoon. reading is okay but eyes feel tired. somewhat more emotional since fall. went to gym and had no symptoms from doing cardio.

## 2023-09-05 NOTE — PHYSICAL EXAM
[FreeTextEntry1] : General: this is a pleasant patient in no acute distress  HEENT conjuntiva are normal  Mental status: Alert and oriented to person, place and time Memory is not formally assessed Language is normal   Head: Palpation of cranium and jaw: ok Occipital nerve tenderness: ok  Cervical Spine: Palpation: mild paraspinal tenderness Lateral Rotation: limited b/l Lateral Flexion: limited b/l Flexion + rotation mild limited b/l  Eye movements: extra-occular movements in tact without nystagmus Saccades: undershoots b/l Smooth Pursuit: occasional saccadic intrusions Visually enhanced VOR: dizzy horiz and vertical Near point of convergence: 10cm  Gait: stable, able to tip toe heel and tandem  Stances: Romberg: stable Shapened romberg: steps at 10s Single leg, eyes closed: steps at 5s

## 2023-09-12 ENCOUNTER — APPOINTMENT (OUTPATIENT)
Dept: HEART AND VASCULAR | Facility: CLINIC | Age: 48
End: 2023-09-12

## 2023-09-12 ENCOUNTER — OUTPATIENT (OUTPATIENT)
Dept: OUTPATIENT SERVICES | Facility: HOSPITAL | Age: 48
LOS: 1 days | End: 2023-09-12

## 2023-09-12 ENCOUNTER — APPOINTMENT (OUTPATIENT)
Dept: CT IMAGING | Facility: CLINIC | Age: 48
End: 2023-09-12
Payer: MEDICAID

## 2023-09-12 PROCEDURE — 75571 CT HRT W/O DYE W/CA TEST: CPT | Mod: 26

## 2023-09-13 ENCOUNTER — APPOINTMENT (OUTPATIENT)
Dept: HEART AND VASCULAR | Facility: CLINIC | Age: 48
End: 2023-09-13
Payer: MEDICAID

## 2023-09-13 VITALS
WEIGHT: 185 LBS | SYSTOLIC BLOOD PRESSURE: 101 MMHG | OXYGEN SATURATION: 99 % | TEMPERATURE: 96.7 F | HEART RATE: 90 BPM | BODY MASS INDEX: 27.4 KG/M2 | DIASTOLIC BLOOD PRESSURE: 62 MMHG | HEIGHT: 69 IN

## 2023-09-13 PROCEDURE — 99214 OFFICE O/P EST MOD 30 MIN: CPT | Mod: 25

## 2023-09-13 PROCEDURE — 93351 STRESS TTE COMPLETE: CPT

## 2023-09-18 ENCOUNTER — TRANSCRIPTION ENCOUNTER (OUTPATIENT)
Age: 48
End: 2023-09-18

## 2023-10-10 ENCOUNTER — APPOINTMENT (OUTPATIENT)
Dept: GASTROENTEROLOGY | Facility: CLINIC | Age: 48
End: 2023-10-10

## 2023-10-14 NOTE — ED ADULT NURSE NOTE - NSICDXPASTMEDICALHX_GEN_ALL_CORE_FT
"Physical Therapy  Treatment    Ayanna Alicia   MRN: [id number]   Admitting Diagnosis: Acute on chronic respiratory failure with hypoxemia    PT Received On: 10/14/23  PT Start Time: 1043     PT Stop Time: 1115    PT Total Time (min): 32 min       Billable Minutes:  Gait Training 16 and Therapeutic Activity 16    Treatment Type: Treatment  PT/PTA: PTA     Number of PTA visits since last PT visit: 1       General Precautions: Standard, fall  Orthopedic Precautions: N/A  Braces: N/A  Respiratory Status: Nasal cannula, flow 6 L/min    Spiritual, Cultural Beliefs, Confucianism Practices, Values that Affect Care: no    Subjective:  Communicated with nurse Stoner, and completed Epic Chart review prior to session. Pt agreed to PT. Pt was found lying supine in bed upon PT entry into room.     Pain/Comfort  Pain Rating 1: 0/10    Objective:   Patient found with: peripheral IV, telemetry, oxygen    Supine > Sit: SBA  Scooting to EOB: SBA  Sit > Stand: SBA no AD  Ambulated 10ft x3 SBA no AD, limited by lines and SOB pt able to self pace  Performed AROM BLE: AP, HIP FLEX, TKE x15, required increase time to recover from SOB        Treatment and Education:  Educated pt on benefits of participation in PT services. Educated pt on performing BLE AROM exercises intermittently throughout the day. Educated pt on importance of sitting up in chair/EOB to improve cardiovascular endurance. Educated pt on "call don't fall" policy and use of call button to alert nursing staff for needs. Pt verbalized understanding.      AM-PAC 6 CLICK MOBILITY  How much help from another person does this patient currently need?   1 = Unable, Total/Dependent Assistance  2 = A lot, Maximum/Moderate Assistance  3 = A little, Minimum/Contact Guard/Supervision  4 = None, Modified [place]/Independent    Turning over in bed (including adjusting bedclothes, sheets and blankets)?: 4  Sitting down on and standing up from a chair with arms (e.g., wheelchair, bedside " commode, etc.): 4  Moving from lying on back to sitting on the side of the bed?: 4  Moving to and from a bed to a chair (including a wheelchair)?: 4  Need to walk in hospital room?: 3  Climbing 3-5 steps with a railing?: 1 (NT)  Basic Mobility Total Score: 20    AM-PAC Raw Score CMS G-Code Modifier Level of Impairment Assistance   6 % Total / Unable   7 - 9 CM 80 - 100% Maximal Assist   10 - 14 CL 60 - 80% Moderate Assist   15 - 19 CK 40 - 60% Moderate Assist   20 - 22 CJ 20 - 40% Minimal Assist   23 CI 1-20% SBA / CGA   24 CH 0% Independent/ Mod I     Patient left up in chair with all lines intact, call button in reach, and nurse notified.    Assessment:  Ayanna Alicia is a 54 y.o. female with a medical diagnosis of Acute on chronic respiratory failure with hypoxemia and presents with hypoxemia.  She presents with the following impairments/functional limitations: impaired endurance, impaired functional mobility which limits mobility and increases risk of falls and other issues related to immobility. Pt will benefit from continued Pt services in order to improve functional status.     Rehab identified problem list/impairments: impaired functional mobility, impaired endurance    Rehab potential is good.    Activity tolerance: Fair    Discharge recommendations: outpatient PT      Barriers to discharge:      Equipment recommendations: none     GOALS:   Multidisciplinary Problems       Physical Therapy Goals          Problem: Physical Therapy    Goal Priority Disciplines Outcome Goal Variances Interventions   Physical Therapy Goal     PT, PT/OT      Description: Pt will perform bed mobility independently in order to participate in EOB activity.  Pt will perform transfers independently in order to participate in OOB activity.   Pt will ambulate 100ft mod I with LRAD and supplemental O2 (6-8 L)in order to participate in daily tasks.                         PLAN:    Patient to be seen 3 x/week to address the  above listed problems via gait training, therapeutic activities, therapeutic exercises, neuromuscular re-education  Plan of Care expires: 10/27/23  Plan of Care reviewed with: patient         10/14/2023   PAST MEDICAL HISTORY:  Cervical nerve root compression     History of depression     HIV disease     HTN (hypertension)     Lumbar nerve root compression     TBI (traumatic brain injury)

## 2023-12-14 ENCOUNTER — TRANSCRIPTION ENCOUNTER (OUTPATIENT)
Age: 48
End: 2023-12-14

## 2024-01-04 ENCOUNTER — APPOINTMENT (OUTPATIENT)
Dept: NEUROLOGY | Facility: CLINIC | Age: 49
End: 2024-01-04

## 2024-01-08 ENCOUNTER — APPOINTMENT (OUTPATIENT)
Dept: HEART AND VASCULAR | Facility: CLINIC | Age: 49
End: 2024-01-08
Payer: MEDICAID

## 2024-01-08 ENCOUNTER — NON-APPOINTMENT (OUTPATIENT)
Age: 49
End: 2024-01-08

## 2024-01-08 VITALS
BODY MASS INDEX: 27.55 KG/M2 | WEIGHT: 186 LBS | HEART RATE: 91 BPM | DIASTOLIC BLOOD PRESSURE: 88 MMHG | SYSTOLIC BLOOD PRESSURE: 148 MMHG | HEIGHT: 69 IN | OXYGEN SATURATION: 99 %

## 2024-01-08 DIAGNOSIS — E78.5 HYPERLIPIDEMIA, UNSPECIFIED: ICD-10-CM

## 2024-01-08 DIAGNOSIS — I10 ESSENTIAL (PRIMARY) HYPERTENSION: ICD-10-CM

## 2024-01-08 PROCEDURE — 93000 ELECTROCARDIOGRAM COMPLETE: CPT

## 2024-01-08 PROCEDURE — G2211 COMPLEX E/M VISIT ADD ON: CPT

## 2024-01-08 PROCEDURE — 99214 OFFICE O/P EST MOD 30 MIN: CPT

## 2024-01-09 PROBLEM — E78.5 HYPERLIPIDEMIA: Status: ACTIVE | Noted: 2022-06-23

## 2024-01-09 RX ORDER — CIPROFLOXACIN 3 MG/ML
0.3 SOLUTION OPHTHALMIC
Qty: 1 | Refills: 0 | Status: COMPLETED | COMMUNITY
Start: 2023-07-10 | End: 2024-01-09

## 2024-01-09 RX ORDER — HYDROCHLOROTHIAZIDE 25 MG/1
25 TABLET ORAL DAILY
Qty: 90 | Refills: 3 | Status: ACTIVE | COMMUNITY
Start: 2023-07-17 | End: 1900-01-01

## 2024-01-09 RX ORDER — BLOOD-GLUCOSE METER
KIT MISCELLANEOUS
Qty: 1 | Refills: 0 | Status: COMPLETED | COMMUNITY
Start: 2023-07-19 | End: 2024-01-09

## 2024-01-09 RX ORDER — LOSARTAN POTASSIUM 100 MG/1
100 TABLET, FILM COATED ORAL DAILY
Qty: 1 | Refills: 3 | Status: ACTIVE | COMMUNITY
Start: 1900-01-01 | End: 1900-01-01

## 2024-04-29 ENCOUNTER — APPOINTMENT (OUTPATIENT)
Dept: HEART AND VASCULAR | Facility: CLINIC | Age: 49
End: 2024-04-29

## 2024-06-06 ENCOUNTER — APPOINTMENT (OUTPATIENT)
Dept: INFECTIOUS DISEASE | Facility: CLINIC | Age: 49
End: 2024-06-06

## 2024-06-25 ENCOUNTER — APPOINTMENT (OUTPATIENT)
Dept: NEUROLOGY | Facility: CLINIC | Age: 49
End: 2024-06-25
Payer: MEDICAID

## 2024-06-25 DIAGNOSIS — S06.0X0A CONCUSSION W/OUT LOSS OF CONSCIOUSNESS, INITIAL ENCOUNTER: ICD-10-CM

## 2024-06-25 DIAGNOSIS — H81.90 UNSPECIFIED DISORDER OF VESTIBULAR FUNCTION, UNSPECIFIED EAR: ICD-10-CM

## 2024-06-25 DIAGNOSIS — S43.004A UNSPECIFIED DISLOCATION OF RIGHT SHOULDER JOINT, INITIAL ENCOUNTER: ICD-10-CM

## 2024-06-25 DIAGNOSIS — M54.2 CERVICALGIA: ICD-10-CM

## 2024-06-25 PROCEDURE — 99215 OFFICE O/P EST HI 40 MIN: CPT

## 2024-07-03 ENCOUNTER — TRANSCRIPTION ENCOUNTER (OUTPATIENT)
Age: 49
End: 2024-07-03

## 2024-07-08 ENCOUNTER — TRANSCRIPTION ENCOUNTER (OUTPATIENT)
Age: 49
End: 2024-07-08

## 2024-07-08 ENCOUNTER — APPOINTMENT (OUTPATIENT)
Dept: INFECTIOUS DISEASE | Facility: CLINIC | Age: 49
End: 2024-07-08
Payer: MEDICAID

## 2024-07-08 VITALS
SYSTOLIC BLOOD PRESSURE: 156 MMHG | HEART RATE: 109 BPM | TEMPERATURE: 96.5 F | HEIGHT: 69 IN | OXYGEN SATURATION: 99 % | WEIGHT: 184 LBS | BODY MASS INDEX: 27.25 KG/M2 | DIASTOLIC BLOOD PRESSURE: 92 MMHG

## 2024-07-08 PROCEDURE — 99213 OFFICE O/P EST LOW 20 MIN: CPT

## 2024-07-09 LAB
BASOPHILS NFR BLD AUTO: 0.3 %
EOSINOPHIL # BLD AUTO: 0.09 K/UL
EOSINOPHIL NFR BLD AUTO: 1.1 %
HCT VFR BLD CALC: 45.8 %
HGB BLD-MCNC: 14.8 G/DL
IMM GRANULOCYTES NFR BLD AUTO: 0.1 %
LYMPHOCYTES # BLD AUTO: 2.62 K/UL
LYMPHOCYTES NFR BLD AUTO: 32.9 %
MAN DIFF?: NORMAL
MCHC RBC-ENTMCNC: 29.2 PG
MCHC RBC-ENTMCNC: 32.3 GM/DL
MCV RBC AUTO: 90.5 FL
MONOCYTES # BLD AUTO: 0.59 K/UL
MONOCYTES NFR BLD AUTO: 7.4 %
NEUTROPHILS # BLD AUTO: 4.63 K/UL
NEUTROPHILS NFR BLD AUTO: 58.2 %
PLATELET # BLD AUTO: 272 K/UL
RBC # BLD: 5.06 M/UL
RBC # FLD: 14.6 %
WBC # FLD AUTO: 7.96 K/UL

## 2024-07-10 LAB
ALBUMIN SERPL ELPH-MCNC: 4.9 G/DL
ALP BLD-CCNC: 90 U/L
ANION GAP SERPL CALC-SCNC: 16 MMOL/L
AST SERPL-CCNC: 23 U/L
BILIRUB SERPL-MCNC: 0.5 MG/DL
BUN SERPL-MCNC: 12 MG/DL
C TRACH RRNA SPEC QL NAA+PROBE: NOT DETECTED
CALCIUM SERPL-MCNC: 10.4 MG/DL
CD3 CELLS # BLD: 1699 CELLS/UL
CD3 CELLS NFR BLD: 69 %
CD3+CD4+ CELLS # BLD: 1008 CELLS/UL
CD3+CD4+ CELLS NFR BLD: 41 %
CD3+CD4+ CELLS/CD3+CD8+ CLL SPEC: 1.46 RATIO
CD3+CD8+ CELLS # SPEC: 690 CELLS/UL
CD3+CD8+ CELLS NFR BLD: 28 %
CHLORIDE SERPL-SCNC: 100 MMOL/L
CO2 SERPL-SCNC: 25 MMOL/L
CREAT SERPL-MCNC: 1.28 MG/DL
EGFR: 68 ML/MIN/1.73M2
GLUCOSE SERPL-MCNC: 80 MG/DL
HIV1 RNA # SERPL NAA+PROBE: NORMAL
HIV1 RNA # SERPL NAA+PROBE: NORMAL COPIES/ML
N GONORRHOEA RRNA SPEC QL NAA+PROBE: NOT DETECTED
POTASSIUM SERPL-SCNC: 3.9 MMOL/L
PROT SERPL-MCNC: 8.2 G/DL
SODIUM SERPL-SCNC: 140 MMOL/L
SOURCE AMPLIFICATION: NORMAL
SOURCE ANAL: NORMAL
SOURCE ORAL: NORMAL
VIRAL LOAD INTERP: NORMAL
VIRAL LOAD LOG: NORMAL LG COP/ML

## 2024-07-11 LAB — RPR SER-TITR: NORMAL

## 2024-07-15 ENCOUNTER — TRANSCRIPTION ENCOUNTER (OUTPATIENT)
Age: 49
End: 2024-07-15

## 2024-07-22 ENCOUNTER — APPOINTMENT (OUTPATIENT)
Dept: FAMILY MEDICINE | Facility: CLINIC | Age: 49
End: 2024-07-22
Payer: MEDICAID

## 2024-07-22 VITALS
OXYGEN SATURATION: 96 % | HEART RATE: 103 BPM | SYSTOLIC BLOOD PRESSURE: 127 MMHG | WEIGHT: 178 LBS | DIASTOLIC BLOOD PRESSURE: 87 MMHG | TEMPERATURE: 98.6 F | HEIGHT: 69 IN | BODY MASS INDEX: 26.36 KG/M2

## 2024-07-22 DIAGNOSIS — S43.004A UNSPECIFIED DISLOCATION OF RIGHT SHOULDER JOINT, INITIAL ENCOUNTER: ICD-10-CM

## 2024-07-22 DIAGNOSIS — Z87.39 PERSONAL HISTORY OF OTHER DISEASES OF THE MUSCULOSKELETAL SYSTEM AND CONNECTIVE TISSUE: ICD-10-CM

## 2024-07-22 DIAGNOSIS — S14.3XXA INJURY OF BRACHIAL PLEXUS, INITIAL ENCOUNTER: ICD-10-CM

## 2024-07-22 DIAGNOSIS — B20 HUMAN IMMUNODEFICIENCY VIRUS [HIV] DISEASE: ICD-10-CM

## 2024-07-22 DIAGNOSIS — Z86.59 PERSONAL HISTORY OF OTHER MENTAL AND BEHAVIORAL DISORDERS: ICD-10-CM

## 2024-07-22 DIAGNOSIS — Z80.1 FAMILY HISTORY OF MALIGNANT NEOPLASM OF TRACHEA, BRONCHUS AND LUNG: ICD-10-CM

## 2024-07-22 DIAGNOSIS — E78.5 HYPERLIPIDEMIA, UNSPECIFIED: ICD-10-CM

## 2024-07-22 DIAGNOSIS — R45.86 EMOTIONAL LABILITY: ICD-10-CM

## 2024-07-22 DIAGNOSIS — F41.9 ANXIETY DISORDER, UNSPECIFIED: ICD-10-CM

## 2024-07-22 DIAGNOSIS — Z00.00 ENCOUNTER FOR GENERAL ADULT MEDICAL EXAMINATION W/OUT ABNORMAL FINDINGS: ICD-10-CM

## 2024-07-22 DIAGNOSIS — S06.0X0A CONCUSSION W/OUT LOSS OF CONSCIOUSNESS, INITIAL ENCOUNTER: ICD-10-CM

## 2024-07-22 DIAGNOSIS — Z78.9 OTHER SPECIFIED HEALTH STATUS: ICD-10-CM

## 2024-07-22 DIAGNOSIS — A53.0 LATENT SYPHILIS, UNSPECIFIED AS EARLY OR LATE: ICD-10-CM

## 2024-07-22 DIAGNOSIS — F32.A ANXIETY DISORDER, UNSPECIFIED: ICD-10-CM

## 2024-07-22 DIAGNOSIS — Z80.7 FAMILY HISTORY OF OTHER MALIGNANT NEOPLASMS OF LYMPHOID, HEMATOPOIETIC AND RELATED TISSUES: ICD-10-CM

## 2024-07-22 DIAGNOSIS — Z87.2 PERSONAL HISTORY OF DISEASES OF THE SKIN AND SUBCUTANEOUS TISSUE: ICD-10-CM

## 2024-07-22 DIAGNOSIS — R20.0 ANESTHESIA OF SKIN: ICD-10-CM

## 2024-07-22 DIAGNOSIS — H81.90 UNSPECIFIED DISORDER OF VESTIBULAR FUNCTION, UNSPECIFIED EAR: ICD-10-CM

## 2024-07-22 DIAGNOSIS — N48.22 CELLULITIS OF CORPUS CAVERNOSUM AND PENIS: ICD-10-CM

## 2024-07-22 DIAGNOSIS — H10.32 UNSPECIFIED ACUTE CONJUNCTIVITIS, LEFT EYE: ICD-10-CM

## 2024-07-22 DIAGNOSIS — I10 ESSENTIAL (PRIMARY) HYPERTENSION: ICD-10-CM
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
## 2024-07-22 DIAGNOSIS — G56.01 CARPAL TUNNEL SYNDROME, RIGHT UPPER LIMB: ICD-10-CM

## 2024-07-22 DIAGNOSIS — L03.211 CELLULITIS OF FACE: ICD-10-CM

## 2024-07-22 PROCEDURE — 99214 OFFICE O/P EST MOD 30 MIN: CPT | Mod: 25

## 2024-07-22 PROCEDURE — 36415 COLL VENOUS BLD VENIPUNCTURE: CPT

## 2024-07-22 PROCEDURE — 99396 PREV VISIT EST AGE 40-64: CPT

## 2024-07-22 NOTE — HEALTH RISK ASSESSMENT
[Good] : ~his/her~  mood as  good [No] : In the past 12 months have you used drugs other than those required for medical reasons? No [0] : 2) Feeling down, depressed, or hopeless: Not at all (0) [PHQ-2 Negative - No further assessment needed] : PHQ-2 Negative - No further assessment needed [Never] : Never [Patient declined colonoscopy] : Patient declined colonoscopy [HIV test declined] : HIV test declined [Hepatitis C test declined] : Hepatitis C test declined [None] : None [Alone] : lives alone [Unemployed] : unemployed [] :  [Sexually Active] : sexually active [High Risk Behavior] : high risk behavior [Feels Safe at Home] : Feels safe at home [Fully functional (bathing, dressing, toileting, transferring, walking, feeding)] : Fully functional (bathing, dressing, toileting, transferring, walking, feeding) [Fully functional (using the telephone, shopping, preparing meals, housekeeping, doing laundry, using] : Fully functional and needs no help or supervision to perform IADLs (using the telephone, shopping, preparing meals, housekeeping, doing laundry, using transportation, managing medications and managing finances) [Audit-CScore] : 0 [de-identified] : exercise  [de-identified] : fruits, veggies  [[id number]] : 0 [Change in mental status noted] : No change in mental status noted [Language] : denies difficulty with language [Reports changes in hearing] : Reports no changes in hearing [Reports changes in vision] : Reports no changes in vision [ColonoscopyComments] : will refer

## 2024-07-24 ENCOUNTER — NON-APPOINTMENT (OUTPATIENT)
Age: 49
End: 2024-07-24

## 2024-07-24 ENCOUNTER — APPOINTMENT (OUTPATIENT)
Dept: AFTER HOURS CARE | Facility: EMERGENCY ROOM | Age: 49
End: 2024-07-24

## 2024-07-24 DIAGNOSIS — H00.011 HORDEOLUM EXTERNUM RIGHT UPPER EYELID: ICD-10-CM

## 2024-07-24 PROCEDURE — 99214 OFFICE O/P EST MOD 30 MIN: CPT | Mod: 95

## 2024-07-24 RX ORDER — AMOXICILLIN AND CLAVULANATE POTASSIUM 875; 125 MG/1; MG/1
875-125 TABLET, COATED ORAL
Qty: 14 | Refills: 0 | Status: ACTIVE | COMMUNITY
Start: 2024-07-24 | End: 1900-01-01

## 2024-07-24 NOTE — HISTORY OF PRESENT ILLNESS
[Home] : at home, [unfilled] , at the time of the visit. [Other Location: e.g. Home (Enter Location, City,State)___] : at [unfilled] [Verbal consent obtained from patient] : the patient, [unfilled] [FreeTextEntry8] : 49 y M h/o HTN and HIV on triumeq (undetectable VL, CD4 >1000) with 1 day R eyelid swelling swelling and mild pain.  No f/c.  No HA.  No viion changes.  No n/v.  No other complaints. Feels a "nodule" on upper eyelid that is firm No vision changes No trauma Believes he has a sore throat as well, no congestion Has not taken anything for symptoms but has used a warm compress Does not wear contacts

## 2024-07-24 NOTE — PLAN
[FreeTextEntry1] : Augmentin NSAIDs Warm compresses PCP f/u Very strict ED precautions discussed with patient

## 2024-07-24 NOTE — ASSESSMENT
[FreeTextEntry1] : Presumed early hordeolum R upper lid.  No systemic symptoms.  Overall no obvious e/o periorbital cellulitis and no c/f orbital cellulitis/endophthalmitis.

## 2024-07-24 NOTE — PHYSICAL EXAM
[Normal Sclera/Conjunctiva] : normal sclera/conjunctiva [EOMI] : extraocular movements intact [de-identified] : PLEASE SEE HPI FOR ADDITIONAL RELEVANT PHYSICAL EXAM FINDINGS GENERAL: no acute distress, nontoxic HEAD: normocephalic EYES: no scleral icterus NECK: trachea midline, Full ROM RESP: no respiratory distress ABD: nondistended MSK: no gross deformity NEURO: alert & fully oriented SKIN: no rash PSYCH: cooperative, good insight, appropriate, fluent speech  [de-identified] : OD mild upper eyelid swelling and erythema to lateral margin, no conj injection.  EOM full and painless.  No d/c.  No surrounding soft tissue erythema

## 2024-07-26 LAB
ALBUMIN SERPL ELPH-MCNC: 4.7 G/DL
ALP BLD-CCNC: 83 U/L
ALT SERPL-CCNC: 22 U/L
ANION GAP SERPL CALC-SCNC: 16 MMOL/L
AST SERPL-CCNC: 26 U/L
BASOPHILS # BLD AUTO: 0.03 K/UL
BASOPHILS NFR BLD AUTO: 0.4 %
BILIRUB SERPL-MCNC: 0.4 MG/DL
BUN SERPL-MCNC: 21 MG/DL
CALCIUM SERPL-MCNC: 9.8 MG/DL
CHLORIDE SERPL-SCNC: 101 MMOL/L
CHOLEST SERPL-MCNC: 226 MG/DL
CO2 SERPL-SCNC: 21 MMOL/L
CREAT SERPL-MCNC: 1.28 MG/DL
EGFR: 68 ML/MIN/1.73M2
EOSINOPHIL # BLD AUTO: 0.07 K/UL
EOSINOPHIL NFR BLD AUTO: 0.8 %
ESTIMATED AVERAGE GLUCOSE: 111 MG/DL
GLUCOSE SERPL-MCNC: 108 MG/DL
HBA1C MFR BLD HPLC: 5.5 %
HCT VFR BLD CALC: 43.4 %
HDLC SERPL-MCNC: 66 MG/DL
HGB BLD-MCNC: 14.2 G/DL
IMM GRANULOCYTES NFR BLD AUTO: 0.1 %
LDLC SERPL CALC-MCNC: 149 MG/DL
LYMPHOCYTES # BLD AUTO: 2.89 K/UL
LYMPHOCYTES NFR BLD AUTO: 35 %
MAN DIFF?: NORMAL
MCHC RBC-ENTMCNC: 28.5 PG
MCHC RBC-ENTMCNC: 32.7 GM/DL
MCV RBC AUTO: 87.1 FL
MONOCYTES # BLD AUTO: 0.69 K/UL
MONOCYTES NFR BLD AUTO: 8.4 %
NEUTROPHILS # BLD AUTO: 4.57 K/UL
NEUTROPHILS NFR BLD AUTO: 55.3 %
NONHDLC SERPL-MCNC: 160 MG/DL
PLATELET # BLD AUTO: 247 K/UL
POTASSIUM SERPL-SCNC: 3.6 MMOL/L
PROT SERPL-MCNC: 7.5 G/DL
PSA SERPL-MCNC: 0.87 NG/ML
RBC # BLD: 4.98 M/UL
RBC # FLD: 14.5 %
SODIUM SERPL-SCNC: 138 MMOL/L
TRIGL SERPL-MCNC: 65 MG/DL
TSH SERPL-ACNC: 1.14 UIU/ML
WBC # FLD AUTO: 8.26 K/UL

## 2024-07-29 ENCOUNTER — NON-APPOINTMENT (OUTPATIENT)
Age: 49
End: 2024-07-29

## 2024-08-05 ENCOUNTER — TRANSCRIPTION ENCOUNTER (OUTPATIENT)
Age: 49
End: 2024-08-05

## 2024-08-13 ENCOUNTER — APPOINTMENT (OUTPATIENT)
Dept: UROLOGY | Facility: CLINIC | Age: 49
End: 2024-08-13

## 2024-08-13 ENCOUNTER — TRANSCRIPTION ENCOUNTER (OUTPATIENT)
Age: 49
End: 2024-08-13

## 2024-08-19 ENCOUNTER — TRANSCRIPTION ENCOUNTER (OUTPATIENT)
Age: 49
End: 2024-08-19

## 2024-09-05 ENCOUNTER — APPOINTMENT (OUTPATIENT)
Dept: GASTROENTEROLOGY | Facility: CLINIC | Age: 49
End: 2024-09-05

## 2024-09-23 ENCOUNTER — APPOINTMENT (OUTPATIENT)
Dept: NEUROLOGY | Age: 49
End: 2024-09-23

## 2024-09-23 NOTE — PHYSICAL EXAM
[FreeTextEntry1] : General: this is a pleasant patient in no acute distress  HEENT conjuntiva are normal  Mental status: Alert and oriented to person, place and time Memory is not formally assessed Language is normal  Head: Palpation of cranium and jaw: ok Occipital nerve tenderness: neg b/l  Cervical Spine: Palpation: mild paraspinal tenderness Lateral Rotation: limited b/l Lateral Flexion: limited b/l Flexion + rotation mild limited b/l  Eye movements: extra-occular movements in tact without nystagmus Saccades: undershoots b/l Smooth Pursuit: occasional saccadic intrusions Visually enhanced VOR: dizzy horiz and vertical Near point of convergence: 10cm  Gait: stable, able to tip toe heel and tandem  Stances: Romberg: stable Shapened romberg: steps at 10s Single leg, eyes closed: steps at 5s.

## 2024-10-23 ENCOUNTER — APPOINTMENT (OUTPATIENT)
Dept: UROLOGY | Facility: CLINIC | Age: 49
End: 2024-10-23

## 2024-10-24 ENCOUNTER — APPOINTMENT (OUTPATIENT)
Dept: FAMILY MEDICINE | Facility: CLINIC | Age: 49
End: 2024-10-24

## 2024-11-08 ENCOUNTER — APPOINTMENT (OUTPATIENT)
Dept: HEART AND VASCULAR | Facility: CLINIC | Age: 49
End: 2024-11-08

## 2024-11-08 PROCEDURE — 93880 EXTRACRANIAL BILAT STUDY: CPT

## 2024-11-11 ENCOUNTER — APPOINTMENT (OUTPATIENT)
Dept: HEART AND VASCULAR | Facility: CLINIC | Age: 49
End: 2024-11-11
Payer: MEDICAID

## 2024-11-11 ENCOUNTER — NON-APPOINTMENT (OUTPATIENT)
Age: 49
End: 2024-11-11

## 2024-11-11 VITALS
SYSTOLIC BLOOD PRESSURE: 138 MMHG | HEART RATE: 102 BPM | WEIGHT: 175 LBS | HEIGHT: 69 IN | DIASTOLIC BLOOD PRESSURE: 87 MMHG | OXYGEN SATURATION: 99 % | BODY MASS INDEX: 25.92 KG/M2

## 2024-11-11 DIAGNOSIS — I10 ESSENTIAL (PRIMARY) HYPERTENSION: ICD-10-CM

## 2024-11-11 DIAGNOSIS — E78.5 HYPERLIPIDEMIA, UNSPECIFIED: ICD-10-CM

## 2024-11-11 PROCEDURE — 93000 ELECTROCARDIOGRAM COMPLETE: CPT

## 2024-11-11 PROCEDURE — 99214 OFFICE O/P EST MOD 30 MIN: CPT

## 2024-11-11 PROCEDURE — G2211 COMPLEX E/M VISIT ADD ON: CPT | Mod: NC

## 2024-11-19 ENCOUNTER — APPOINTMENT (OUTPATIENT)
Dept: UROLOGY | Facility: CLINIC | Age: 49
End: 2024-11-19
Payer: MEDICAID

## 2024-11-19 DIAGNOSIS — N47.2 PARAPHIMOSIS: ICD-10-CM

## 2024-11-19 DIAGNOSIS — Z78.9 OTHER SPECIFIED HEALTH STATUS: ICD-10-CM

## 2024-11-19 PROCEDURE — 99204 OFFICE O/P NEW MOD 45 MIN: CPT

## 2024-11-20 LAB
APPEARANCE: CLEAR
BACTERIA: NEGATIVE /HPF
BILIRUBIN URINE: NEGATIVE
BLOOD URINE: NEGATIVE
CALCIUM OXALATE CRYSTALS: PRESENT
CAST: 0 /LPF
COLOR: YELLOW
EPITHELIAL CELLS: 0 /HPF
GLUCOSE QUALITATIVE U: NEGATIVE MG/DL
KETONES URINE: NEGATIVE MG/DL
LEUKOCYTE ESTERASE URINE: NEGATIVE
MICROSCOPIC-UA: NORMAL
NITRITE URINE: NEGATIVE
PH URINE: 6.5
PROTEIN URINE: NEGATIVE MG/DL
RED BLOOD CELLS URINE: 4 /HPF
REVIEW: NORMAL
SPECIFIC GRAVITY URINE: 1.02
UROBILINOGEN URINE: 0.2 MG/DL
WHITE BLOOD CELLS URINE: 0 /HPF

## 2024-11-21 LAB — BACTERIA UR CULT: NORMAL

## 2024-12-10 ENCOUNTER — EMERGENCY (EMERGENCY)
Facility: HOSPITAL | Age: 49
LOS: 1 days | Discharge: HOME | End: 2024-12-10
Attending: STUDENT IN AN ORGANIZED HEALTH CARE EDUCATION/TRAINING PROGRAM | Admitting: STUDENT IN AN ORGANIZED HEALTH CARE EDUCATION/TRAINING PROGRAM
Payer: MEDICAID

## 2024-12-10 VITALS
TEMPERATURE: 98 F | SYSTOLIC BLOOD PRESSURE: 162 MMHG | DIASTOLIC BLOOD PRESSURE: 109 MMHG | HEART RATE: 96 BPM | OXYGEN SATURATION: 95 % | RESPIRATION RATE: 16 BRPM

## 2024-12-10 LAB
ANION GAP SERPL CALC-SCNC: 11 MMOL/L — SIGNIFICANT CHANGE UP (ref 5–17)
BASOPHILS # BLD AUTO: 0.01 K/UL — SIGNIFICANT CHANGE UP (ref 0–0.2)
BASOPHILS NFR BLD AUTO: 0.1 % — SIGNIFICANT CHANGE UP (ref 0–2)
BUN SERPL-MCNC: 8 MG/DL — SIGNIFICANT CHANGE UP (ref 7–23)
CALCIUM SERPL-MCNC: 9.6 MG/DL — SIGNIFICANT CHANGE UP (ref 8.4–10.5)
CHLORIDE SERPL-SCNC: 95 MMOL/L — LOW (ref 96–108)
CO2 SERPL-SCNC: 26 MMOL/L — SIGNIFICANT CHANGE UP (ref 22–31)
CREAT SERPL-MCNC: 0.83 MG/DL — SIGNIFICANT CHANGE UP (ref 0.5–1.3)
EGFR: 107 ML/MIN/1.73M2 — SIGNIFICANT CHANGE UP
EOSINOPHIL # BLD AUTO: 0.03 K/UL — SIGNIFICANT CHANGE UP (ref 0–0.5)
EOSINOPHIL NFR BLD AUTO: 0.2 % — SIGNIFICANT CHANGE UP (ref 0–6)
FLUAV AG NPH QL: SIGNIFICANT CHANGE UP
FLUBV AG NPH QL: SIGNIFICANT CHANGE UP
GLUCOSE SERPL-MCNC: 116 MG/DL — HIGH (ref 70–99)
HCT VFR BLD CALC: 50.2 % — HIGH (ref 39–50)
HGB BLD-MCNC: 16.6 G/DL — SIGNIFICANT CHANGE UP (ref 13–17)
IMM GRANULOCYTES NFR BLD AUTO: 0.3 % — SIGNIFICANT CHANGE UP (ref 0–0.9)
LYMPHOCYTES # BLD AUTO: 1.53 K/UL — SIGNIFICANT CHANGE UP (ref 1–3.3)
LYMPHOCYTES # BLD AUTO: 12.7 % — LOW (ref 13–44)
MAGNESIUM SERPL-MCNC: 2.1 MG/DL — SIGNIFICANT CHANGE UP (ref 1.6–2.6)
MCHC RBC-ENTMCNC: 28.1 PG — SIGNIFICANT CHANGE UP (ref 27–34)
MCHC RBC-ENTMCNC: 33.1 G/DL — SIGNIFICANT CHANGE UP (ref 32–36)
MCV RBC AUTO: 84.9 FL — SIGNIFICANT CHANGE UP (ref 80–100)
MONOCYTES # BLD AUTO: 1.31 K/UL — HIGH (ref 0–0.9)
MONOCYTES NFR BLD AUTO: 10.9 % — SIGNIFICANT CHANGE UP (ref 2–14)
NEUTROPHILS # BLD AUTO: 9.15 K/UL — HIGH (ref 1.8–7.4)
NEUTROPHILS NFR BLD AUTO: 75.8 % — SIGNIFICANT CHANGE UP (ref 43–77)
NRBC # BLD: 0 /100 WBCS — SIGNIFICANT CHANGE UP (ref 0–0)
PLATELET # BLD AUTO: 241 K/UL — SIGNIFICANT CHANGE UP (ref 150–400)
POTASSIUM SERPL-MCNC: 4.6 MMOL/L — SIGNIFICANT CHANGE UP (ref 3.5–5.3)
POTASSIUM SERPL-SCNC: 4.6 MMOL/L — SIGNIFICANT CHANGE UP (ref 3.5–5.3)
RBC # BLD: 5.91 M/UL — HIGH (ref 4.2–5.8)
RBC # FLD: 13.6 % — SIGNIFICANT CHANGE UP (ref 10.3–14.5)
RSV RNA NPH QL NAA+NON-PROBE: DETECTED
SARS-COV-2 RNA SPEC QL NAA+PROBE: SIGNIFICANT CHANGE UP
SODIUM SERPL-SCNC: 132 MMOL/L — LOW (ref 135–145)
TROPONIN T, HIGH SENSITIVITY RESULT: <6 NG/L — SIGNIFICANT CHANGE UP (ref 0–51)
WBC # BLD: 12.07 K/UL — HIGH (ref 3.8–10.5)
WBC # FLD AUTO: 12.07 K/UL — HIGH (ref 3.8–10.5)

## 2024-12-10 PROCEDURE — 71046 X-RAY EXAM CHEST 2 VIEWS: CPT | Mod: 26

## 2024-12-10 PROCEDURE — 99284 EMERGENCY DEPT VISIT MOD MDM: CPT

## 2024-12-10 RX ORDER — SODIUM CHLORIDE 9 MG/ML
1000 INJECTION, SOLUTION INTRAMUSCULAR; INTRAVENOUS; SUBCUTANEOUS ONCE
Refills: 0 | Status: COMPLETED | OUTPATIENT
Start: 2024-12-10 | End: 2024-12-10

## 2024-12-10 RX ORDER — LOSARTAN POTASSIUM 100 MG/1
25 TABLET, FILM COATED ORAL ONCE
Refills: 0 | Status: COMPLETED | OUTPATIENT
Start: 2024-12-10 | End: 2024-12-10

## 2024-12-10 RX ORDER — METOCLOPRAMIDE HYDROCHLORIDE 10 MG/1
10 TABLET ORAL ONCE
Refills: 0 | Status: COMPLETED | OUTPATIENT
Start: 2024-12-10 | End: 2024-12-10

## 2024-12-10 RX ORDER — ACETAMINOPHEN 500MG 500 MG/1
1000 TABLET, COATED ORAL ONCE
Refills: 0 | Status: COMPLETED | OUTPATIENT
Start: 2024-12-10 | End: 2024-12-10

## 2024-12-10 RX ADMIN — ACETAMINOPHEN 500MG 400 MILLIGRAM(S): 500 TABLET, COATED ORAL at 22:05

## 2024-12-10 RX ADMIN — METOCLOPRAMIDE HYDROCHLORIDE 104 MILLIGRAM(S): 10 TABLET ORAL at 22:30

## 2024-12-10 RX ADMIN — SODIUM CHLORIDE 1000 MILLILITER(S): 9 INJECTION, SOLUTION INTRAMUSCULAR; INTRAVENOUS; SUBCUTANEOUS at 22:03

## 2024-12-10 RX ADMIN — LOSARTAN POTASSIUM 25 MILLIGRAM(S): 100 TABLET, FILM COATED ORAL at 22:06

## 2024-12-10 NOTE — ED ADULT TRIAGE NOTE - CHIEF COMPLAINT QUOTE
Pt. presents to the ED for dizziness, blurry vision, headache and confusion since Sunday. HX of TBI, HIV, HTN.

## 2024-12-10 NOTE — ED ADULT NURSE NOTE - NS TRANSFER PATIENT BELONGINGS
Thank you for visiting the [facility], [place]     It is difficult to recognize all elements of any illness or injury in a single visit. The examination, treatment, and x-rays received are on a preliminary basis only. A radiologist will also review your x-rays for final reading. Call your primary care provider if you have questions or problems before your next appointment. If you are unable to  reach your Primary Care Provider (PCP), please call or return to the Walk-In Clinic.  If symptoms worsen or do not resolve please follow-up with your Primary Care Provider (PCP), Walk-In or the nearest  Emergency Room for emergency symptoms.  If you are unsure of whom to follow up with, call [phone] and ask to speak with either your PCP, the Walk-In nurse or the on-call Provider if you are calling after hours.      If you are referred to a specialist or scheduled for a test, our Referrals department will call you with your appointment date and time within 3 business days. If you have not heard from them in this time frame, please call [phone] and ask for the Referrals department.     Test results: Unless otherwise instructed, you should be notified of test results within one week. Please call our office if you do not hear from us within this time frame at [phone].     Hours:  Monday through Friday: 7:00 am to 7:00 pm  Saturday: 7:00 am to 3:00 pm  Sunday: 7:00 am to 3:00 pm.  Holiday hours may vary, please call [phone] on Holidays.  Walk-In is closed on Thanksgiving Day, Rachel Day and  New Year's Day.      Thank you again for visiting [facility] [place].  Rolando Lacey MD      
Clothing

## 2024-12-10 NOTE — ED ADULT NURSE NOTE - OBJECTIVE STATEMENT
Pt with PMH HTN compliant with meds, HIV, and TBI presents to ED c/o lightheadedness, cough, blurry vision, headache, and confusion since Sunday. Pt states he has not taken his meds today because he was "too sick to get out of bed". Pt also reports feeling dehydrated. Pt resp even and unlabored. Denies f/c, n/v/d, numbness/tingling, weakness to one side.

## 2024-12-10 NOTE — ED ADULT NURSE NOTE - NSFALLUNIVINTERV_ED_ALL_ED
Bed/Stretcher in lowest position, wheels locked, appropriate side rails in place/Call bell, personal items and telephone in reach/Instruct patient to call for assistance before getting out of bed/chair/stretcher/Non-slip footwear applied when patient is off stretcher/[place] to call system/Physically safe environment - no spills, clutter or unnecessary equipment/Purposeful proactive rounding/Room/bathroom lighting operational, light cord in reach

## 2024-12-11 VITALS
TEMPERATURE: 98 F | SYSTOLIC BLOOD PRESSURE: 135 MMHG | RESPIRATION RATE: 16 BRPM | HEART RATE: 82 BPM | OXYGEN SATURATION: 97 % | DIASTOLIC BLOOD PRESSURE: 96 MMHG

## 2024-12-11 LAB
APPEARANCE UR: CLEAR — SIGNIFICANT CHANGE UP
BILIRUB UR-MCNC: NEGATIVE — SIGNIFICANT CHANGE UP
COLOR SPEC: YELLOW — SIGNIFICANT CHANGE UP
DIFF PNL FLD: ABNORMAL
GLUCOSE UR QL: NEGATIVE MG/DL — SIGNIFICANT CHANGE UP
KETONES UR-MCNC: NEGATIVE MG/DL — SIGNIFICANT CHANGE UP
LEUKOCYTE ESTERASE UR-ACNC: NEGATIVE — SIGNIFICANT CHANGE UP
NITRITE UR-MCNC: NEGATIVE — SIGNIFICANT CHANGE UP
PH UR: 6.5 — SIGNIFICANT CHANGE UP (ref 5–8)
PROT UR-MCNC: SIGNIFICANT CHANGE UP MG/DL
SP GR SPEC: 1.02 — SIGNIFICANT CHANGE UP (ref 1–1.03)
UROBILINOGEN FLD QL: 1 MG/DL — SIGNIFICANT CHANGE UP (ref 0.2–1)

## 2024-12-11 PROCEDURE — 70450 CT HEAD/BRAIN W/O DYE: CPT | Mod: 26,MC

## 2024-12-11 NOTE — ED PROVIDER NOTE - ATTENDING APP SHARED VISIT CONTRIBUTION OF CARE
i discussed the care of the pt directly with the ACP while the pt was in the ED. i have reviewed the ACP note and agree w/ the history, exam and plan of care other than as noted above.    49yM w/ generalized weakness cough headache malaise decreased po x 3 days. no hx headaches  check labs, cxr, ct head, viral swab, meds, reassess

## 2024-12-11 NOTE — ED PROVIDER NOTE - NSFOLLOWUPINSTRUCTIONS_ED_ALL_ED_FT
RSV (Respiratory Syncytial Virus) Infection    WHAT YOU NEED TO KNOW:    What do I need to know about respiratory syncytial virus (RSV)? RSV causes infection in your nose, throat, lungs, and airways. An RSV infection causes the airways to become swollen and filled with fluid and mucus. This infection may make it hard for you to breathe. An RSV infection is most common from fall through spring. An RSV infection may lead to other lung problems, such as pneumonia or bronchiolitis.    How does the virus spread? RSV is highly contagious. Germs may be spread to others through coughing, sneezing, or close contact. Germs may be left on objects such as counters, doorknobs, or beds. You can get infected by touching objects that carry the virus and then rubbing your eyes or nose.    What increases my risk for a severe RSV infection?    A weak immune system    A heart or lung problem    Smoking or exposure to secondhand smoke  What are the signs and symptoms of an RSV infection? An RSV infection begins like a common cold. You may have any of the following:    Runny or stuffy nose    Sore throat or cough    Mild fever or headache    Breathing faster than usual    Decrease in appetite  How is an RSV infection diagnosed? Your healthcare provider will examine you and ask about your symptoms. Your oxygen level may be checked. The provider may swab the inside of your nose. This sample is tested for infection.    How is an RSV infection treated? Most adults with an RSV infection can be treated at home. Medicine may be given to decrease symptoms. You may need to be monitored or treated in the hospital if you have a severe RSV infection. You may need antibiotics if you have a secondary bacterial infection, such as bacterial pneumonia.    How can I manage my symptoms?    Get plenty of rest. Rest can help your body fight the infection.    Drink more liquids than usual. Liquids will help thin and loosen mucus so you can cough it up. Liquids will also keep you hydrated. Liquids that help prevent dehydration include water, fruit juice, or broth. Do not drink liquids that contain caffeine. Caffeine can increase your risk for dehydration. Ask your healthcare provider how much liquid to drink each day and which liquids are best for you.    Remove mucus from your nose. Place saline (saltwater) spray or drops into your nose to help remove mucus. Saline spray and drops are available over-the-counter. Follow directions on the spray or drops bottle. Blow your nose after you use these products.    Use a cool mist humidifier in your room. Cool mist can help thin mucus and make it easier for you to breathe. Be sure to clean the humidifier as directed.  How can I help prevent an RSV infection?    Ask about the RSV vaccine. Your healthcare provider may recommend the vaccine if you are at high risk for an RSV infection. The vaccine is given to adults 60 years or older. It may also be given to pregnant persons to help prevent infection in infants younger than 6 months. The vaccine is given between weeks 32 and 36 of pregnancy, or at least 2 weeks before delivery.    Wash your hands often. Wash after you use the bathroom, change a child's diaper, and before you prepare or eat food. Use soap and water every time. Rub your soapy hands together, lacing your fingers. Wash the front and back of each hand, and in between all fingers. Use the fingers of one hand to scrub under the fingernails of the other hand. Wash for at least 20 seconds. Rinse with warm, running water for several seconds. Then dry your hands with a clean towel or paper towel. You can use hand  that contains alcohol if soap and water are not available. Do not touch your eyes, nose, or mouth without washing your hands first.  Handwashing      Clean objects with a disinfectant solution. Clean tables, counters, and doorknobs. Use a disinfecting wipe, a single-use sponge, or a cloth you can wash and reuse. Use disinfecting  if you do not have wipes. You can create a disinfecting  by mixing 1 part bleach with 10 parts water. Wash sheets and towels in hot, soapy water, and dry on high heat.    Stay away from people who are sick. Avoid crowds or people with colds and other respiratory infections.    Do not smoke. Nicotine and other chemicals in cigarettes and cigars can increase your risk for infection. Ask your provider for information if you currently smoke and need help to quit. E-cigarettes or smokeless tobacco still contain nicotine. Talk to your healthcare before you use these products.    Call your local emergency number (911 in the [place]) or have someone call if:    You have chest pain or trouble breathing.    When should I seek immediate care?    You have a fever over 102ºF (39ºC).    When should I call my doctor?    You have thick, green, or yellow drainage from your nose.    You cough up thick yellow, green, or bloody mucus.    Your symptoms do not get better, or they get worse.    You have questions or concerns about your condition or care.

## 2024-12-11 NOTE — ED PROVIDER NOTE - PATIENT PORTAL LINK FT
You can access the FollowMyHealth Patient Portal offered by [facility] by registering at the following website: [email or web address]. By joining [employer]’s FollowMyHealth portal, you will also be able to view your health information using other applications (apps) compatible with our system.

## 2024-12-17 PROCEDURE — 96375 TX/PRO/DX INJ NEW DRUG ADDON: CPT

## 2024-12-17 PROCEDURE — 87637 SARSCOV2&INF A&B&RSV AMP PRB: CPT

## 2024-12-17 PROCEDURE — 97530 THERAPEUTIC ACTIVITIES: CPT

## 2024-12-17 PROCEDURE — 96374 THER/PROPH/DIAG INJ IV PUSH: CPT

## 2024-12-17 PROCEDURE — 82962 GLUCOSE BLOOD TEST: CPT

## 2024-12-17 PROCEDURE — 71046 X-RAY EXAM CHEST 2 VIEWS: CPT

## 2024-12-17 PROCEDURE — 85025 COMPLETE CBC W/AUTO DIFF WBC: CPT

## 2024-12-17 PROCEDURE — 81001 URINALYSIS AUTO W/SCOPE: CPT

## 2024-12-17 PROCEDURE — 83735 ASSAY OF MAGNESIUM: CPT

## 2024-12-17 PROCEDURE — 99284 EMERGENCY DEPT VISIT MOD MDM: CPT | Mod: 25

## 2024-12-17 PROCEDURE — 36415 COLL VENOUS BLD VENIPUNCTURE: CPT

## 2024-12-17 PROCEDURE — 70450 CT HEAD/BRAIN W/O DYE: CPT | Mod: MC

## 2024-12-17 PROCEDURE — 84484 ASSAY OF TROPONIN QUANT: CPT

## 2024-12-17 PROCEDURE — 80048 BASIC METABOLIC PNL TOTAL CA: CPT

## 2024-12-20 ENCOUNTER — APPOINTMENT (OUTPATIENT)
Dept: UROLOGY | Facility: CLINIC | Age: 49
End: 2024-12-20

## 2024-12-28 ENCOUNTER — APPOINTMENT (OUTPATIENT)
Dept: AFTER HOURS CARE | Facility: EMERGENCY ROOM | Age: 49
End: 2024-12-28

## 2024-12-28 ENCOUNTER — EMERGENCY (EMERGENCY)
Facility: HOSPITAL | Age: 49
LOS: 1 days | Discharge: HOME | End: 2024-12-28
Attending: EMERGENCY MEDICINE | Admitting: EMERGENCY MEDICINE
Payer: MEDICAID

## 2024-12-28 VITALS
RESPIRATION RATE: 16 BRPM | SYSTOLIC BLOOD PRESSURE: 138 MMHG | OXYGEN SATURATION: 99 % | TEMPERATURE: 98 F | DIASTOLIC BLOOD PRESSURE: 94 MMHG | HEART RATE: 100 BPM

## 2024-12-28 VITALS
HEART RATE: 107 BPM | DIASTOLIC BLOOD PRESSURE: 107 MMHG | SYSTOLIC BLOOD PRESSURE: 153 MMHG | WEIGHT: 175.05 LBS | RESPIRATION RATE: 17 BRPM | HEIGHT: 69 IN | TEMPERATURE: 99 F | OXYGEN SATURATION: 98 %
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
## 2024-12-28 DIAGNOSIS — L08.9 ABRASION OF UNSPECIFIED FOREARM, INITIAL ENCOUNTER: ICD-10-CM

## 2024-12-28 DIAGNOSIS — S50.819A ABRASION OF UNSPECIFIED FOREARM, INITIAL ENCOUNTER: ICD-10-CM

## 2024-12-28 DIAGNOSIS — L03.113 CELLULITIS OF RIGHT UPPER LIMB: ICD-10-CM

## 2024-12-28 LAB
ANION GAP SERPL CALC-SCNC: 9 MMOL/L — SIGNIFICANT CHANGE UP (ref 5–17)
BASOPHILS # BLD AUTO: 0.01 K/UL — SIGNIFICANT CHANGE UP (ref 0–0.2)
BASOPHILS NFR BLD AUTO: 0.1 % — SIGNIFICANT CHANGE UP (ref 0–2)
BUN SERPL-MCNC: 19 MG/DL — SIGNIFICANT CHANGE UP (ref 7–23)
CALCIUM SERPL-MCNC: 9.8 MG/DL — SIGNIFICANT CHANGE UP (ref 8.4–10.5)
CHLORIDE SERPL-SCNC: 98 MMOL/L — SIGNIFICANT CHANGE UP (ref 96–108)
CO2 SERPL-SCNC: 28 MMOL/L — SIGNIFICANT CHANGE UP (ref 22–31)
CREAT SERPL-MCNC: 0.94 MG/DL — SIGNIFICANT CHANGE UP (ref 0.5–1.3)
EGFR: 99 ML/MIN/1.73M2 — SIGNIFICANT CHANGE UP
EOSINOPHIL # BLD AUTO: 0.1 K/UL — SIGNIFICANT CHANGE UP (ref 0–0.5)
EOSINOPHIL NFR BLD AUTO: 0.8 % — SIGNIFICANT CHANGE UP (ref 0–6)
GLUCOSE SERPL-MCNC: 102 MG/DL — HIGH (ref 70–99)
HCT VFR BLD CALC: 43.5 % — SIGNIFICANT CHANGE UP (ref 39–50)
HGB BLD-MCNC: 14.6 G/DL — SIGNIFICANT CHANGE UP (ref 13–17)
IMM GRANULOCYTES NFR BLD AUTO: 0.3 % — SIGNIFICANT CHANGE UP (ref 0–0.9)
LYMPHOCYTES # BLD AUTO: 16.8 % — SIGNIFICANT CHANGE UP (ref 13–44)
LYMPHOCYTES # BLD AUTO: 2.21 K/UL — SIGNIFICANT CHANGE UP (ref 1–3.3)
MCHC RBC-ENTMCNC: 28.6 PG — SIGNIFICANT CHANGE UP (ref 27–34)
MCHC RBC-ENTMCNC: 33.6 G/DL — SIGNIFICANT CHANGE UP (ref 32–36)
MCV RBC AUTO: 85.1 FL — SIGNIFICANT CHANGE UP (ref 80–100)
MONOCYTES # BLD AUTO: 0.92 K/UL — HIGH (ref 0–0.9)
MONOCYTES NFR BLD AUTO: 7 % — SIGNIFICANT CHANGE UP (ref 2–14)
NEUTROPHILS # BLD AUTO: 9.86 K/UL — HIGH (ref 1.8–7.4)
NEUTROPHILS NFR BLD AUTO: 75 % — SIGNIFICANT CHANGE UP (ref 43–77)
NRBC # BLD: 0 /100 WBCS — SIGNIFICANT CHANGE UP (ref 0–0)
PLATELET # BLD AUTO: 337 K/UL — SIGNIFICANT CHANGE UP (ref 150–400)
POTASSIUM SERPL-MCNC: 4.1 MMOL/L — SIGNIFICANT CHANGE UP (ref 3.5–5.3)
POTASSIUM SERPL-SCNC: 4.1 MMOL/L — SIGNIFICANT CHANGE UP (ref 3.5–5.3)
RBC # BLD: 5.11 M/UL — SIGNIFICANT CHANGE UP (ref 4.2–5.8)
RBC # FLD: 14 % — SIGNIFICANT CHANGE UP (ref 10.3–14.5)
SODIUM SERPL-SCNC: 135 MMOL/L — SIGNIFICANT CHANGE UP (ref 135–145)
WBC # BLD: 13.14 K/UL — HIGH (ref 3.8–10.5)
WBC # FLD AUTO: 13.14 K/UL — HIGH (ref 3.8–10.5)

## 2024-12-28 PROCEDURE — 87040 BLOOD CULTURE FOR BACTERIA: CPT

## 2024-12-28 PROCEDURE — 87491 CHLMYD TRACH DNA AMP PROBE: CPT

## 2024-12-28 PROCEDURE — 73130 X-RAY EXAM OF HAND: CPT

## 2024-12-28 PROCEDURE — 73110 X-RAY EXAM OF WRIST: CPT

## 2024-12-28 PROCEDURE — 96374 THER/PROPH/DIAG INJ IV PUSH: CPT

## 2024-12-28 PROCEDURE — 99284 EMERGENCY DEPT VISIT MOD MDM: CPT

## 2024-12-28 PROCEDURE — 87591 N.GONORRHOEAE DNA AMP PROB: CPT

## 2024-12-28 PROCEDURE — 80048 BASIC METABOLIC PNL TOTAL CA: CPT

## 2024-12-28 PROCEDURE — 99284 EMERGENCY DEPT VISIT MOD MDM: CPT | Mod: 25

## 2024-12-28 PROCEDURE — 73110 X-RAY EXAM OF WRIST: CPT | Mod: 26,RT

## 2024-12-28 PROCEDURE — 99213 OFFICE O/P EST LOW 20 MIN: CPT | Mod: NC

## 2024-12-28 PROCEDURE — 85025 COMPLETE CBC W/AUTO DIFF WBC: CPT

## 2024-12-28 PROCEDURE — 96375 TX/PRO/DX INJ NEW DRUG ADDON: CPT

## 2024-12-28 PROCEDURE — 36415 COLL VENOUS BLD VENIPUNCTURE: CPT

## 2024-12-28 PROCEDURE — 73130 X-RAY EXAM OF HAND: CPT | Mod: 26,RT

## 2024-12-28 RX ORDER — AMPICILLIN AND SULBACTAM 1; .5 G/1; G/1
3 INJECTION, POWDER, FOR SOLUTION INTRAVENOUS ONCE
Refills: 0 | Status: COMPLETED | OUTPATIENT
Start: 2024-12-28 | End: 2024-12-28

## 2024-12-28 RX ORDER — KETOROLAC TROMETHAMINE 30 MG/ML
15 INJECTION INTRAMUSCULAR; INTRAVENOUS ONCE
Refills: 0 | Status: DISCONTINUED | OUTPATIENT
Start: 2024-12-28 | End: 2024-12-28

## 2024-12-28 RX ORDER — AMOXICILLIN/POTASSIUM CLAV 250-125 MG
1 TABLET ORAL
Qty: 20 | Refills: 0
Start: 2024-12-28 | End: 2025-01-06

## 2024-12-28 RX ADMIN — KETOROLAC TROMETHAMINE 15 MILLIGRAM(S): 30 INJECTION INTRAMUSCULAR; INTRAVENOUS at 13:24

## 2024-12-28 RX ADMIN — AMPICILLIN AND SULBACTAM 200 GRAM(S): 1; .5 INJECTION, POWDER, FOR SOLUTION INTRAVENOUS at 13:24

## 2024-12-28 NOTE — ED ADULT NURSE NOTE - OBJECTIVE STATEMENT
49y male to the ER from home via triage c/o of hand swelling. Pt reports that pt began having hand swelling and redness after being scratched and bit by a cat one week ago. Pt able to move hand but reports tenderness and swelling. Redness and swelling noted to the right hand.

## 2024-12-28 NOTE — ED PROVIDER NOTE - OBJECTIVE STATEMENT
50 y/o m no pmh presents c/o right wrist/hand swelling and pain since yesterday.  Pt stating sx started the day after his cat bit/scratched him in that area.  Denies fever, chills, numbness/tingling to ext, all other ROS negative.

## 2024-12-28 NOTE — ED PROVIDER NOTE - PATIENT PORTAL LINK FT
You can access the FollowMyHealth Patient Portal offered by [facility] by registering at the following website: [email or web address]. By joining [employer]’s FollowMyHealth portal, you will also be able to view your health information using other applications (apps) compatible with our system.

## 2024-12-28 NOTE — ED PROVIDER NOTE - CONSTITUTIONAL [-], MLM
[Feeling Fatigued] : feeling fatigued [SOB] : shortness of breath [Palpitations] : palpitations [Negative] : Heme/Lymph no fever/no chills

## 2024-12-28 NOTE — ED PROVIDER NOTE - MUSCULOSKELETAL, MLM
right wrist/hand; multiple small puncture wounds with +swelling and erythema, no fluctuance, no proximal streaking, +FROM wrist and hand/digits, sensation intact distally, radial pulse 2+

## 2024-12-28 NOTE — ED PROVIDER NOTE - ATTENDING APP SHARED VISIT CONTRIBUTION OF CARE
rt forearm redness/swelling after cat bite consistent w cellulitis. wbc elevated. given dose of unasyn. able to range joints normally, no signs joint infection or tenosynovitis. discussed admission for iv abx / observation but pt prefers oral abx at home. understands to return for increased redness/swelling/pain/fever for further eval/ treatment.

## 2024-12-28 NOTE — ED PROVIDER NOTE - CLINICAL SUMMARY MEDICAL DECISION MAKING FREE TEXT BOX
48 y/o m no pmh presents c/o right hand/wrist pain and swelling x 2 days after cat scratch and bite to the area.  Pt afebrile in ED, ext NVI, no fb on xr, labs notable for wbc 13.  Pt given dose of unasyn in ED, offered admission for IV abx which he declined, preferring outpatient oral course.  Rx for augmentin sent to pharmacy, given strict return precautions for worsening swelling, fever.

## 2024-12-28 NOTE — ED PROVIDER NOTE - NSFOLLOWUPINSTRUCTIONS_ED_ALL_ED_FT
Cellulitis, Adult  A person's legs and feet. One leg is normal and the other leg is affected by cellulitis.  Cellulitis is a skin infection. The infected area is usually warm, red, swollen, and tender. It most commonly occurs on the lower body, such as the legs, feet, and toes, but this condition can occur on any part of the body. The infection can travel to the muscles, blood, and underlying tissue and become life-threatening without treatment. It is important to get medical treatment right away for this condition.    What are the causes?  Cellulitis is caused by bacteria. The bacteria enter through a break in the skin, such as a cut, burn, insect or animal bite, open sore, or crack.    What increases the risk?  This condition is more likely to occur in people who:  Have a weak body's defense system (immune system).  Are older than 60 years old.  Have diabetes.  Have a type of long-term (chronic) liver disease (cirrhosis) or kidney disease.  Are obese.  Have a skin condition such as:  An itchy rash, such as eczema or psoriasis.  A fungal rash on the feet or in skinfolds.  Blistering rashes, such as shingles or chickenpox.  Slow movement of blood in the veins (venous stasis).  Fluid buildup below the skin (edema).  Have open wounds on the skin, such as cuts, puncture wounds, burns, bites, scrapes, tattoos, piercings, or wounds from surgery.  Have had radiation therapy.  Use IV drugs.  What are the signs or symptoms?  Symptoms of this condition include:  Skin that looks red, purple, or slightly darker than your usual skin color.  Streaks or spots on the skin.  Swollen area of the skin.  Tenderness or pain when an area of the skin is touched.  Warm skin.  Fever or chills.  Blisters.  Tiredness (fatigue).  How is this diagnosed?  This condition is diagnosed based on a medical history and physical exam. You may also have tests, including:  Blood tests.  Imaging tests.  Tests on a sample of fluid taken from the wound (wound culture).  How is this treated?  Treatment for this condition may include:  Medicines. These may include antibiotics or medicines to treat allergies (antihistamines).  Rest.  Applying cold or warm wet cloths (compresses) to the skin.  If the condition is severe, you may need to stay in the hospital and get antibiotics through an IV.  The infection usually starts to get better within 1–2 days of treatment.    Follow these instructions at home:  Medicines    Take over-the-counter and prescription medicines only as told by your health care provider.  If you were prescribed antibiotics, take them as told by your provider. Do not stop using the antibiotic even if you start to feel better.  General instructions    Drink enough fluid to keep your pee (urine) pale yellow.  Do not touch or rub the infected area.  Raise (elevate) the infected area above the level of your heart while you are sitting or lying down.  Return to your normal activities as told by your provider. Ask your provider what activities are safe for you.  Apply warm or cold compresses to the affected area as told by your provider.  Keep all follow-up visits. Your provider will need to make sure that a more serious infection is not developing.  Contact a health care provider if:  You have a fever.  Your symptoms do not improve within 1–2 days of starting treatment or you develop new symptoms.  Your bone or joint underneath the infected area becomes painful after the skin has healed.  Your infection returns in the same area or another area. Signs of this may include:  You notice a swollen bump in the infected area.  Your red area gets larger, turns dark in color, or becomes more painful.  Drainage increases.  Pus or a bad smell develops in your infected area.  You have more pain.  You feel ill and have muscle aches and weakness.  You develop vomiting or diarrhea that will not go away.  Get help right away if:  You notice red streaks coming from the infected area.  You notice the skin turns purple or black and falls off.  This symptom may be an emergency. Get help right away. Call 911.  Do not wait to see if the symptom will go away.  Do not drive yourself to the hospital.  This information is not intended to replace advice given to you by your health care provider. Make sure you discuss any questions you have with your health care provider.

## 2024-12-29 ENCOUNTER — INPATIENT (INPATIENT)
Facility: HOSPITAL | Age: 49
LOS: 0 days | Discharge: LEFT BEFORE DISCHARGE | DRG: 975 | End: 2024-12-30
Attending: INTERNAL MEDICINE | Admitting: HOSPITALIST
Payer: MEDICAID

## 2024-12-29 VITALS
HEIGHT: 69 IN | DIASTOLIC BLOOD PRESSURE: 95 MMHG | RESPIRATION RATE: 16 BRPM | OXYGEN SATURATION: 98 % | WEIGHT: 164.91 LBS | HEART RATE: 114 BPM | SYSTOLIC BLOOD PRESSURE: 137 MMHG | TEMPERATURE: 99 F

## 2024-12-29 DIAGNOSIS — Z29.9 ENCOUNTER FOR PROPHYLACTIC MEASURES, UNSPECIFIED: ICD-10-CM

## 2024-12-29 DIAGNOSIS — A44.9 BARTONELLOSIS, UNSPECIFIED: ICD-10-CM

## 2024-12-29 DIAGNOSIS — I10 ESSENTIAL (PRIMARY) HYPERTENSION: ICD-10-CM

## 2024-12-29 DIAGNOSIS — A41.9 SEPSIS, UNSPECIFIED ORGANISM: ICD-10-CM

## 2024-12-29 DIAGNOSIS — L03.90 CELLULITIS, UNSPECIFIED: ICD-10-CM

## 2024-12-29 DIAGNOSIS — B20 HUMAN IMMUNODEFICIENCY VIRUS [HIV] DISEASE: ICD-10-CM

## 2024-12-29 DIAGNOSIS — F41.9 ANXIETY DISORDER, UNSPECIFIED: ICD-10-CM

## 2024-12-29 LAB
ADD ON TEST-SPECIMEN IN LAB: SIGNIFICANT CHANGE UP
ADD ON TEST-SPECIMEN IN LAB: SIGNIFICANT CHANGE UP
ALBUMIN SERPL ELPH-MCNC: 3.7 G/DL — SIGNIFICANT CHANGE UP (ref 3.3–5)
ALP SERPL-CCNC: 90 U/L — SIGNIFICANT CHANGE UP (ref 40–120)
ALT FLD-CCNC: 23 U/L — SIGNIFICANT CHANGE UP (ref 10–45)
ANION GAP SERPL CALC-SCNC: 12 MMOL/L — SIGNIFICANT CHANGE UP (ref 5–17)
AST SERPL-CCNC: SIGNIFICANT CHANGE UP (ref 10–40)
BASOPHILS # BLD AUTO: 0.02 K/UL — SIGNIFICANT CHANGE UP (ref 0–0.2)
BASOPHILS NFR BLD AUTO: 0.2 % — SIGNIFICANT CHANGE UP (ref 0–2)
BILIRUB SERPL-MCNC: 0.4 MG/DL — SIGNIFICANT CHANGE UP (ref 0.2–1.2)
BUN SERPL-MCNC: 20 MG/DL — SIGNIFICANT CHANGE UP (ref 7–23)
CALCIUM SERPL-MCNC: 9.3 MG/DL — SIGNIFICANT CHANGE UP (ref 8.4–10.5)
CHLORIDE SERPL-SCNC: 100 MMOL/L — SIGNIFICANT CHANGE UP (ref 96–108)
CO2 SERPL-SCNC: 25 MMOL/L — SIGNIFICANT CHANGE UP (ref 22–31)
CREAT SERPL-MCNC: 1.12 MG/DL — SIGNIFICANT CHANGE UP (ref 0.5–1.3)
CRP SERPL-MCNC: 28.8 MG/L — HIGH (ref 0–4)
EGFR: 80 ML/MIN/1.73M2 — SIGNIFICANT CHANGE UP
EOSINOPHIL # BLD AUTO: 0.2 K/UL — SIGNIFICANT CHANGE UP (ref 0–0.5)
EOSINOPHIL NFR BLD AUTO: 1.8 % — SIGNIFICANT CHANGE UP (ref 0–6)
ERYTHROCYTE [SEDIMENTATION RATE] IN BLOOD: 18 MM/HR — SIGNIFICANT CHANGE UP
GLUCOSE SERPL-MCNC: 95 MG/DL — SIGNIFICANT CHANGE UP (ref 70–99)
HCT VFR BLD CALC: 42.8 % — SIGNIFICANT CHANGE UP (ref 39–50)
HGB BLD-MCNC: 14.4 G/DL — SIGNIFICANT CHANGE UP (ref 13–17)
IMM GRANULOCYTES NFR BLD AUTO: 0.3 % — SIGNIFICANT CHANGE UP (ref 0–0.9)
LACTATE SERPL-SCNC: 0.8 MMOL/L — SIGNIFICANT CHANGE UP (ref 0.5–2)
LYMPHOCYTES # BLD AUTO: 2.67 K/UL — SIGNIFICANT CHANGE UP (ref 1–3.3)
LYMPHOCYTES # BLD AUTO: 23.5 % — SIGNIFICANT CHANGE UP (ref 13–44)
MCHC RBC-ENTMCNC: 28.9 PG — SIGNIFICANT CHANGE UP (ref 27–34)
MCHC RBC-ENTMCNC: 33.6 G/DL — SIGNIFICANT CHANGE UP (ref 32–36)
MCV RBC AUTO: 85.9 FL — SIGNIFICANT CHANGE UP (ref 80–100)
MONOCYTES # BLD AUTO: 0.93 K/UL — HIGH (ref 0–0.9)
MONOCYTES NFR BLD AUTO: 8.2 % — SIGNIFICANT CHANGE UP (ref 2–14)
NEUTROPHILS # BLD AUTO: 7.5 K/UL — HIGH (ref 1.8–7.4)
NEUTROPHILS NFR BLD AUTO: 66 % — SIGNIFICANT CHANGE UP (ref 43–77)
NRBC # BLD: 0 /100 WBCS — SIGNIFICANT CHANGE UP (ref 0–0)
PLATELET # BLD AUTO: 317 K/UL — SIGNIFICANT CHANGE UP (ref 150–400)
POTASSIUM SERPL-MCNC: 3.9 MMOL/L — SIGNIFICANT CHANGE UP (ref 3.5–5.3)
POTASSIUM SERPL-SCNC: 3.9 MMOL/L — SIGNIFICANT CHANGE UP (ref 3.5–5.3)
PROT SERPL-MCNC: 7.9 G/DL — SIGNIFICANT CHANGE UP (ref 6–8.3)
RBC # BLD: 4.98 M/UL — SIGNIFICANT CHANGE UP (ref 4.2–5.8)
RBC # FLD: 14.1 % — SIGNIFICANT CHANGE UP (ref 10.3–14.5)
SODIUM SERPL-SCNC: 137 MMOL/L — SIGNIFICANT CHANGE UP (ref 135–145)
WBC # BLD: 11.35 K/UL — HIGH (ref 3.8–10.5)
WBC # FLD AUTO: 11.35 K/UL — HIGH (ref 3.8–10.5)

## 2024-12-29 PROCEDURE — 99285 EMERGENCY DEPT VISIT HI MDM: CPT

## 2024-12-29 PROCEDURE — 99223 1ST HOSP IP/OBS HIGH 75: CPT | Mod: GC

## 2024-12-29 PROCEDURE — 76882 US LMTD JT/FCL EVL NVASC XTR: CPT | Mod: 26,RT

## 2024-12-29 RX ORDER — ACETAMINOPHEN 80 MG/.8ML
650 SOLUTION/ DROPS ORAL EVERY 6 HOURS
Refills: 0 | Status: DISCONTINUED | OUTPATIENT
Start: 2024-12-29 | End: 2024-12-30

## 2024-12-29 RX ORDER — AMPICILLIN SODIUM AND SULBACTAM SODIUM 100; 50 MG/ML; MG/ML
INJECTION, POWDER, FOR SOLUTION INTRAVENOUS
Refills: 0 | Status: DISCONTINUED | OUTPATIENT
Start: 2024-12-29 | End: 2024-12-30

## 2024-12-29 RX ORDER — SODIUM CHLORIDE 9 MG/ML
2300 INJECTION, SOLUTION INTRAMUSCULAR; INTRAVENOUS; SUBCUTANEOUS ONCE
Refills: 0 | Status: COMPLETED | OUTPATIENT
Start: 2024-12-29 | End: 2024-12-29

## 2024-12-29 RX ORDER — AMPICILLIN SODIUM AND SULBACTAM SODIUM 100; 50 MG/ML; MG/ML
3 INJECTION, POWDER, FOR SOLUTION INTRAVENOUS ONCE
Refills: 0 | Status: COMPLETED | OUTPATIENT
Start: 2024-12-29 | End: 2024-12-29

## 2024-12-29 RX ORDER — AZITHROMYCIN MONOHYDRATE 200 MG/5ML
500 POWDER, FOR SUSPENSION ORAL EVERY 24 HOURS
Refills: 0 | Status: DISCONTINUED | OUTPATIENT
Start: 2024-12-29 | End: 2024-12-30

## 2024-12-29 RX ORDER — SERTRALINE HYDROCHLORIDE 25 MG/1
12.5 TABLET ORAL DAILY
Refills: 0 | Status: DISCONTINUED | OUTPATIENT
Start: 2024-12-29 | End: 2024-12-30

## 2024-12-29 RX ORDER — ENOXAPARIN SODIUM 60 MG/.6ML
40 INJECTION INTRAVENOUS; SUBCUTANEOUS EVERY 24 HOURS
Refills: 0 | Status: DISCONTINUED | OUTPATIENT
Start: 2024-12-29 | End: 2024-12-30

## 2024-12-29 RX ORDER — INFLUENZA A VIRUS A/WISCONSIN/588/2019 (H1N1) RECOMBINANT HEMAGGLUTININ ANTIGEN, INFLUENZA A VIRUS A/DARWIN/6/2021 (H3N2) RECOMBINANT HEMAGGLUTININ ANTIGEN, INFLUENZA B VIRUS B/AUSTRIA/1359417/2021 RECOMBINANT HEMAGGLUTININ ANTIGEN, AND INFLUENZA B VIRUS B/PHUKET/3073/2013 RECOMBINANT HEMAGGLUTININ ANTIGEN 45; 45; 45; 45 UG/.5ML; UG/.5ML; UG/.5ML; UG/.5ML
0.5 INJECTION INTRAMUSCULAR ONCE
Refills: 0 | Status: DISCONTINUED | OUTPATIENT
Start: 2024-12-29 | End: 2024-12-30

## 2024-12-29 RX ORDER — VANCOMYCIN HYDROCHLORIDE 5 G/100ML
1000 INJECTION, POWDER, LYOPHILIZED, FOR SOLUTION INTRAVENOUS ONCE
Refills: 0 | Status: COMPLETED | OUTPATIENT
Start: 2024-12-29 | End: 2024-12-29

## 2024-12-29 RX ORDER — CLINDAMYCIN HYDROCHLORIDE 300 MG/1
CAPSULE ORAL
Refills: 0 | Status: DISCONTINUED | OUTPATIENT
Start: 2024-12-29 | End: 2024-12-29

## 2024-12-29 RX ORDER — CLINDAMYCIN HYDROCHLORIDE 300 MG/1
600 CAPSULE ORAL EVERY 8 HOURS
Refills: 0 | Status: DISCONTINUED | OUTPATIENT
Start: 2024-12-29 | End: 2024-12-30

## 2024-12-29 RX ORDER — LOSARTAN POTASSIUM 100 MG/1
50 TABLET, FILM COATED ORAL DAILY
Refills: 0 | Status: DISCONTINUED | OUTPATIENT
Start: 2024-12-29 | End: 2024-12-29

## 2024-12-29 RX ORDER — AMPICILLIN SODIUM AND SULBACTAM SODIUM 100; 50 MG/ML; MG/ML
3 INJECTION, POWDER, FOR SOLUTION INTRAVENOUS EVERY 6 HOURS
Refills: 0 | Status: DISCONTINUED | OUTPATIENT
Start: 2024-12-29 | End: 2024-12-30

## 2024-12-29 RX ORDER — LOSARTAN POTASSIUM 100 MG/1
50 TABLET, FILM COATED ORAL DAILY
Refills: 0 | Status: DISCONTINUED | OUTPATIENT
Start: 2024-12-30 | End: 2024-12-30

## 2024-12-29 RX ADMIN — AMPICILLIN SODIUM AND SULBACTAM SODIUM 200 GRAM(S): 100; 50 INJECTION, POWDER, FOR SOLUTION INTRAVENOUS at 18:22

## 2024-12-29 RX ADMIN — CLINDAMYCIN HYDROCHLORIDE 100 MILLIGRAM(S): 300 CAPSULE ORAL at 21:31

## 2024-12-29 RX ADMIN — SODIUM CHLORIDE 2300 MILLILITER(S): 9 INJECTION, SOLUTION INTRAMUSCULAR; INTRAVENOUS; SUBCUTANEOUS at 08:04

## 2024-12-29 RX ADMIN — VANCOMYCIN HYDROCHLORIDE 250 MILLIGRAM(S): 5 INJECTION, POWDER, LYOPHILIZED, FOR SOLUTION INTRAVENOUS at 08:04

## 2024-12-29 RX ADMIN — LOSARTAN POTASSIUM 50 MILLIGRAM(S): 100 TABLET, FILM COATED ORAL at 11:59

## 2024-12-29 RX ADMIN — SERTRALINE HYDROCHLORIDE 12.5 MILLIGRAM(S): 25 TABLET ORAL at 21:31

## 2024-12-29 RX ADMIN — SODIUM CHLORIDE 2300 MILLILITER(S): 9 INJECTION, SOLUTION INTRAMUSCULAR; INTRAVENOUS; SUBCUTANEOUS at 09:37

## 2024-12-29 RX ADMIN — CLINDAMYCIN HYDROCHLORIDE 100 MILLIGRAM(S): 300 CAPSULE ORAL at 15:41

## 2024-12-29 RX ADMIN — ACETAMINOPHEN 650 MILLIGRAM(S): 80 SOLUTION/ DROPS ORAL at 14:22

## 2024-12-29 RX ADMIN — AMPICILLIN SODIUM AND SULBACTAM SODIUM 200 GRAM(S): 100; 50 INJECTION, POWDER, FOR SOLUTION INTRAVENOUS at 12:24

## 2024-12-29 RX ADMIN — AZITHROMYCIN MONOHYDRATE 255 MILLIGRAM(S): 200 POWDER, FOR SUSPENSION ORAL at 17:14

## 2024-12-29 RX ADMIN — VANCOMYCIN HYDROCHLORIDE 1000 MILLIGRAM(S): 5 INJECTION, POWDER, LYOPHILIZED, FOR SOLUTION INTRAVENOUS at 09:37

## 2024-12-29 NOTE — ED ADULT NURSE NOTE - NSFALLUNIVINTERV_ED_ALL_ED
Bed/Stretcher in lowest position, wheels locked, appropriate side rails in place/Call bell, personal items and telephone in reach/Instruct patient to call for assistance before getting out of bed/chair/stretcher/Non-slip footwear applied when patient is off stretcher/Ryan to call system/Physically safe environment - no spills, clutter or unnecessary equipment/Purposeful proactive rounding/Room/bathroom lighting operational, light cord in reach

## 2024-12-29 NOTE — ED ADULT NURSE NOTE - OBJECTIVE STATEMENT
pt c/o cellulitis of r hand from cat scratch. swelling and redness to hand and wrist. pt c/o significant pain. states received dose of iv unsayn and has been taking oral abx as prescribed. feels like swelling and pain has gotten worse.

## 2024-12-29 NOTE — H&P ADULT - NSHPPHYSICALEXAM_GEN_ALL_CORE
General: NAD  HEENT: PERRL, EOM, anicteric sclera, MMM  Cardiovascular: +S1/S2; RRR; no M/R/G  Respiratory: CTAB; no W/R/R  Gastrointestinal: soft, NT/ND; +BS x4  MSK: 5/5 strength bilaterally UEs and LEs. Erythema and induration extending from R mid forearm into the hand. Limited but active ROM in all R hand digits. No obvious puncture/wound site (patient's hand covered in lety).   Extremities: WWP; 2+ peripheral pulses bilaterally; no LE edema  Skin: normal color and turgor; no rash  Neurologic: AOx3, no focal deficits

## 2024-12-29 NOTE — H&P ADULT - PROBLEM SELECTOR PLAN 4
Per NICOLE, CD4 1008 and VLUD  Home med: Triumeq. Per NICOLE, CD4 1008 and SHANIAUD  Home med: Triumeq.  -continued home med

## 2024-12-29 NOTE — H&P ADULT - NSHPSOCIALHISTORY_GEN_ALL_CORE
Occupation: unemployed  Living situation: alone  Tobacco:none  Alcohol: none  Illicit drug use: none

## 2024-12-29 NOTE — H&P ADULT - PROBLEM SELECTOR PLAN 5
Pt with hx of HTH, taking Losartan 100mg qd  -c/w with home meds, losartan 100mg qd. Pt with hx of HTH, taking Losartan 50 mg qD  -c/w with home meds, losartan 50 mg qD

## 2024-12-29 NOTE — PATIENT PROFILE ADULT - FUNCTIONAL ASSESSMENT - BASIC MOBILITY 6.
4-calculated by average/Not able to assess (calculate score using [facility] averaging method)

## 2024-12-29 NOTE — ED PROVIDER NOTE - OBJECTIVE STATEMENT
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 Was seen in ed yesterday for sx's and dx'd w/ cellulitis, started on augmentin. Pt took 3 doses with no improvement, states swelling and pain is getting worse. + subj warmth though no fever. No n/t/w, cp, sob, abd pain, n/v/d.... 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 Nosebleeds are common and can be caused by many things. They are usually not a sign of a serious medical problem.    Follow these instructions at home:  When you have a nosebleed:    The correct and incorrect way to hold your head and fingers for first aid during a nosebleed.   Sit down.  Tilt your head forward a little.  Follow these steps:  Pinch your nose with a clean towel or tissue.  Keep pinching your nose for 5 minutes. Do not let go.  After 5 minutes, let go of your nose.  Keep doing these steps until the bleeding stops.  Do not put tissues or other things in your nose to stop the bleeding.  Avoid lying down or putting your head back.  Use a nose spray decongestant as told by your doctor.  After a nosebleed:    Try not to blow your nose or sniffle for several hours.  Try not to strain, lift, or bend at the waist for several days.  Aspirin and medicines that thin your blood make bleeding more likely. If you take these medicines:  Ask your doctor if you should stop taking them or if you should change how much you take.  Do not stop taking the medicine unless your doctor tells you to.  If your nosebleed was caused by dryness, use over-the-counter saline nasal spray or gel and a humidifier as told by your doctor. This will keep the inside of your nose moist and allow it to heal. If you need to use nasal spray or gel:  Choose one that is water-soluble.  Use only as much as you need and use it only as often as needed.  Do not lie down right away after you use it.  If you get nosebleeds often, talk with your doctor about treatments. These may include:  Nasal cautery. A chemical swab or electrical device is used to lightly burn tiny blood vessels inside the nose. This helps stop or prevent nosebleeds.  Nasal packing. A gauze or other material is placed in the nose to keep constant pressure on the bleeding area.  Contact a doctor if:  You have a fever.  You get nosebleeds often.  You get nosebleeds more often than usual.  You bruise very easily.  You have something stuck in your nose.  You are bleeding in your mouth.  You vomit or cough up brown material.  You get a nosebleed after you start a new medicine.  Get help right away if:  You have a nosebleed after you fall or hurt your head.  Your nosebleed does not go away after 20 minutes.  You feel dizzy or weak.  You have unusual bleeding from other parts of your body.  You have unusual bruising on other parts of your body.  You get sweaty.  You vomit blood.  Summary  Nosebleeds are common. They are usually not a sign of a serious medical problem.  When you have a nosebleed, sit down and tilt your head a little forward. Pinch your nose with a clean tissue for 5 minutes.  Use saline spray or saline gel and a humidifier as told by your doctor.  Get help right away if your nosebleed does not go away after 20 minutes.  This information is not intended to replace advice given to you by your health care provider. Make sure you discuss any questions you have with your health care provider.    Document Revised: 12/27/2022 Document Reviewed: 12/27/2022  [employer] Patient Education © 2024 [employer]depression, cellulitis to LUE 2/2 cat scratch, who p/w cat scratch to r wrist sustained 3d ago, with eventual pain and swelling to r wrist/ hand since. Was seen in ed yesterday for sx's and dx'd w/ cellulitis, started on augmentin. Pt took 3 doses with no improvement, states swelling and pain is getting worse. + subj warmth though no fever. No n/t/w, cp, sob, abd pain, n/v/d....

## 2024-12-29 NOTE — H&P ADULT - PROBLEM SELECTOR PLAN 1
Pt meets 2/4 criteria[, WBC 13.14] and source of infection coming from left arm/forearm from cat scrach. Lactate 0.8. In ED, given 2.3 L bolus NS and Vancomycin 1g.     -Monitor vitals  - c/w Unasyn 3g q6h  -started azithromycin 500mg q24h   -F/u blood cx   -Trend cbc, ESR, CRP to monitor infection. Pt meets 2/4 criteria[, WBC 13.14] and source of infection coming from right arm/forearm from cat scrach. Lactate 0.8. In ED, given 2.3 L bolus NS and Vancomycin 1g.     -Monitor vitals  - c/w Unasyn 3g q6h  -started azithromycin 500mg q24h   -F/u blood cx   -Trend cbc, ESR, CRP to monitor infection.  - follow up MRI hand and forearm   -hand surgery consulted, appreciate recs Pt meets 2/4 criteria[, WBC 13.14] and source of infection coming from right arm/forearm from cat scrach. Lactate 0.8. In ED, given 2.3 L bolus NS and Vancomycin 1g.   Xray hand and wrist: No acute fracture, osteomyelitis, soft tissue gas, or foreign body.   -Monitor vitals  -started Unasyn 3g q6h for 7 days and azithromycin 500mg q24  -started clindamycin until MRI done  -F/u blood cx   -Trend cbc, ESR, CRP to monitor infection.  - follow up MRI hand and forearm   -hand surgery consulted, appreciate recs

## 2024-12-29 NOTE — H&P ADULT - PROBLEM SELECTOR PLAN 7
F: s/p 2.3 L NS  E: replete K<4, Mg<2  N: regular  DVT ppx lovenox. F: s/p 2.3 L NS  E: replete K<4, Mg<2  N: regular  DVT ppx lovenox.  Code status: Full code

## 2024-12-29 NOTE — PATIENT PROFILE ADULT - NSPROPOAURINARYCATHETER_GEN_A_NUR
Pt c/o fever, headache, loss of taste/smell, sorethroat, body aches and chills x1 week.  States has tried Ibuprofen, Aleve and Tylenol without relief of sx's.  Denies known exposure to COVID.    Denies known Latex allergy or symptoms of Latex sensitivity.  Medications reviewed and updated  Tobacco history verified 7/13/2020    PCP sravanthi    Patient would like communication of their results via:        Cell Phone:   Telephone Information:   Mobile [phone]     Okay to leave a message containing results? Yes    PPE worn by writer: gloves, gown, face shield/goggles and level 3 procedure mask       no

## 2024-12-29 NOTE — ED ADULT TRIAGE NOTE - CHIEF COMPLAINT QUOTE
"I was just here and discharged. I have cellulitis at my Rt arm. You need to give whatever IV antibiotics" "I was just here and discharged. I have cellulitis on my Rt arm. You need to give whatever IV antibiotics"

## 2024-12-29 NOTE — ED PROVIDER NOTE - CLINICAL SUMMARY MEDICAL DECISION MAKING FREE TEXT BOX
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 Was seen in ed yesterday for sx's and dx'd w/ cellulitis, started on augmentin. Pt took 3 doses with no improvement, states swelling and pain is getting worse. + subj warmth though no fever. No n/t/w, cp, sob, abd pain, n/v/d....    Afebrile. Mildly tachycardic. VS otherwise stable. + cellulitis of r hand, with no signs of fts, or sepitic jt. NVI. Pt started on augmentin however only took 3 doses thus far. Plan for labs, bld cx, iv abx, ivf. Will continue to monitor. 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 Was seen in ed yesterday for sx's and dx'd w/ cellulitis, started on augmentin. Pt took 3 doses with no improvement, states swelling and pain is getting worse. + subj warmth though no fever. No n/t/w, cp, sob, abd pain, n/v/d....    Afebrile. Mildly tachycardic. VS otherwise stable. + cellulitis of r hand, with no signs of fts, or sepitic jt. NVI. Pt started on augmentin however only took 3 doses thus far. Plan for labs, bld cx, iv abx, ivf. Will continue to monitor.    Wbc improved to 11.35 (was 13 yesterday). Lactate wnl. Xrays of r hand/ wrist from yesterday neg for acute findings. Given degree of swelling, will admit to medicine svc for continued iv abx for tx of cellulitis.

## 2024-12-29 NOTE — H&P ADULT - PROBLEM SELECTOR PLAN 2
Left arm edematous, erythematous, and painful. Erythema marked. Concern for bartonella infection. Negative for compartment syndrome as neuromuscular intact and +2 radial pulses of LUE. .    -Monitor progression soft tissue erythema.   -PRN Tylenol 650 q6h for mild/moderate pain   -started Unasyn 3g q6h for 7 days and azithromycin 500mg q24  -F/u blood cx. Right arm edematous, erythematous, and painful. Erythema marked. Concern for bartonella infection. Negative for compartment syndrome as neuromuscular intact and +2 radial pulses of RUE. .    -Monitor progression soft tissue erythema.   -PRN Tylenol 650 q6h for mild/moderate pain   -started Unasyn 3g q6h for 7 days and azithromycin 500mg q24  -started clindamycin   -F/u blood cx.  - follow up MRI hand and forearm   -hand surgery consulted, appreciate recs Right arm edematous, erythematous, and painful. Erythema marked. Concern for bartonella infection. Negative for compartment syndrome as neuromuscular intact and +2 radial pulses of RUE. .  Xray hand and wrist: No acute fracture, osteomyelitis, soft tissue gas, or foreign body.    -Monitor progression soft tissue erythema.   -PRN Tylenol 650 q6h for mild/moderate pain   -started Unasyn 3g q6h for 7 days and azithromycin 500mg q24  -started clindamycin until MRI done  -F/u blood cx.  - follow up MRI hand and forearm   -hand surgery consulted, appreciate recs

## 2024-12-29 NOTE — H&P ADULT - ATTENDING COMMENTS
Patient was seen and examined at bedside on 12/29/2024 at 1130 am with HS present. Patient reports feeling pain and edema at RUE. Denies SOB, CP, N/V. Denies melena, hematochezia. ROS is otherwise negative. Vitals, labwork and pertinent imaging reviewed. Exam - NAD, AAO x 4, PERRLA, EOMI, MMM, supple neck, chest - CTA b/l, CV - rrr, s1s2, no m/r/g, abd - soft, NTND, + BS, ext - wwp, marked edema, erythema, TTP of RUE, palpable chord at RUE antecubital fossa, psych - normal affect    Plan:  -C/w abx  -Hand surgery consult  -Check MRI R wrist/hand/forearm  -RUE doppler

## 2024-12-29 NOTE — ED ADULT NURSE REASSESSMENT NOTE - NS ED NURSE REASSESS COMMENT FT1
vancomycin infusing as ordered, pt resting in stretcher, denies pain at this time, safety precautions in place. No s/s of distress noted.

## 2024-12-29 NOTE — H&P ADULT - HISTORY OF PRESENT ILLNESS
PCP:   Pharmacy:   - - - - -    HPI:     In the ED:  Initial vital signs: T: 98.9F, HR:114, BP: 137/95, R: 16, SpO2:98% on RA  ED course:   Labs: significant for leukocytosis wbc 13.14->11.35, hgb/hct 14.4/42.8 plt 317, cmp unremarkable   Imaging:  Xray hand and wrist: No acute fracture, osteomyelitis, soft tissue gas, or foreign body.  EKG:   Medications: 2.3 L NS bolus, vancomycin 1g IVPBX1  Consults: none      HPI: 49M with PMHx of HIV, depression/anxiety, hypertension, and recent admission for cat-scratch/bite who presents for R forearm/hand swelling and pain in setting of cat-scratch/bite, admitted for RUE cellulitis. The patient reports that he was "playing rough" with his cat who bit and scratched him on Thursday. He cannot identify an exact puncture/injury site but states it was on his anterior medial wrist. He presented to the emergency department yesterday (12/28) for evaluation given an acute increase in erythema and induration and was discharged home with augmentin. He presents to the ED again for failure of symptoms to improve with augmentin and increase in pain/induration. He denies any other injuries to the RUE or injection drug use. He denies any objective fevers but states that he has felt hot at home. Denies any chills, vomiting, nausea, diarrhea, or headache. He does endorse fatigue overall. He follows with Dr. Ayala for management of HIV and has been adherent to his triumeq regimen     In the ED:  Initial vital signs: T: 98.9F, HR:114, BP: 137/95, R: 16, SpO2:98% on RA  ED course:   Labs: significant for leukocytosis wbc 13.14->11.35, hgb/hct 14.4/42.8 plt 317, cmp unremarkable   Imaging:  Xray hand and wrist: No acute fracture, osteomyelitis, soft tissue gas, or foreign body.  EKG:   Medications: 2.3 L NS bolus, vancomycin 1g IVPBX1  Consults: none

## 2024-12-29 NOTE — ED ADULT TRIAGE NOTE - INTERNATIONAL TRAVEL
No
I, Janeen Menendez, performed a face to face bedside interview with this patient regarding history of present illness, review of symptoms and relevant past medical, social and family history.  I completed an independent physical examination. Medical decision making, follow-up on ordered tests (ie labs, radiologic studies) and re-evaluation of the patient's status has been communicated to the ACP.  Disposition of the patient will be based on test outcome and response to ED interventions.    12yo female with left AOE, no mastoid tenderness, nontoxic. will dc home on ciprodex. Janeen Menendez DO

## 2024-12-29 NOTE — H&P ADULT - ASSESSMENT
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 Vitals and labs indicative of sepsis. Pt admitted for treatment of sepsis 2/2 cellulitis 2/2 cat scratch. Concern for bartonella infection.   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 Vitals and labs indicative of sepsis. Pt admitted for treatment of sepsis 2/2 cellulitis 2/2 cat scratch. Concern for bartonella infection.

## 2024-12-29 NOTE — CONSULT NOTE ADULT - SUBJECTIVE AND OBJECTIVE BOX
Orthopaedic Surgery Consult Note    For Surgeon: Dr. Harper      HPI:  49M PMHx HIV (viral load undetectable) p/w R hand erythema & swelling s/p cat bite injury on Thursday. Suffered puncture wound on dorsal / radial aspect of wrist/forearm region. Presented to ED yesterday with similar presentation and was discharged on PO augmentin. Presents to the ED again today due to lack of improvement. Has not noticed spread of erythema beyond forearm region. Denies fevers / chills. Denies numbness, tingling, or weakness.       PAST MEDICAL & SURGICAL HISTORY:  HIV disease  HTN (hypertension)  TBI (traumatic brain injury)  Cervical nerve root compression  Lumbar nerve root compression  History of depression      No significant past surgical history      MEDICATIONS  (STANDING):  ampicillin/sulbactam  IVPB      ampicillin/sulbactam  IVPB 3 Gram(s) IV Intermittent every 6 hours  azithromycin  IVPB 500 milliGRAM(s) IV Intermittent every 24 hours  clindamycin IVPB 600 milliGRAM(s) IV Intermittent every 8 hours  enoxaparin Injectable 40 milliGRAM(s) SubCutaneous every 24 hours  influenza   Vaccine 0.5 milliLiter(s) IntraMuscular once  sertraline 12.5 milliGRAM(s) Oral daily    MEDICATIONS  (PRN):  acetaminophen     Tablet .. 650 milliGRAM(s) Oral every 6 hours PRN Mild Pain (1 - 3)      Vital Signs Last 24 Hrs  T(C): 36.8 (29 Dec 2024 16:55), Max: 37.2 (29 Dec 2024 05:08)  T(F): 98.3 (29 Dec 2024 16:55), Max: 98.9 (29 Dec 2024 05:08)  HR: 89 (29 Dec 2024 16:55) (74 - 114)  BP: 120/76 (29 Dec 2024 16:55) (120/76 - 144/91)  BP(mean): --  RR: 18 (29 Dec 2024 16:55) (16 - 18)  SpO2: 98% (29 Dec 2024 16:55) (95% - 98%)    Parameters below as of 29 Dec 2024 16:55  Patient On (Oxygen Delivery Method): room air      Physical Exam:  -Erythema & swelling surrounding puncture wound on dorsal / radial wrist  -No fluctuant mass / expressible purulence   -Painless passive ROM at wrist and all digits   -NVID                          14.4   11.35 )-----------( 317      ( 29 Dec 2024 07:54 )             42.8     12-29    137  |  100  |  20  ----------------------------<  95  3.9   |  25  |  1.12    Ca    9.3      29 Dec 2024 07:54    TPro  7.9  /  Alb  3.7  /  TBili  0.4  /  DBili  x   /  AST  See Note  /  ALT  23  /  AlkPhos  90  12-29      A/P: 49yMale w/ R hand / forearm cellulitis s/p cat bite injury     -No acute orthopedic intervention at this time    -C/w IV Unasyn, Azithromycin, & Clinda   -Hand elevation  -Recommend marking out borders of cellulitis   -Monitor response to IV Abx  -Ortho will continue to follow       -Discussed with Dr. Harper    Ortho Pager [phone]

## 2024-12-29 NOTE — H&P ADULT - NSHPLABSRESULTS_GEN_ALL_CORE
LABS:                         14.4   11.35 )-----------( 317      ( 29 Dec 2024 07:54 )             42.8     12-29    137  |  100  |  20  ----------------------------<  95  3.9   |  25  |  1.12    Ca    9.3      29 Dec 2024 07:54    TPro  7.9  /  Alb  3.7  /  TBili  0.4  /  DBili  x   /  AST  See Note  /  ALT  23  /  AlkPhos  90  12-29      Urinalysis Basic - ( 29 Dec 2024 07:54 )    Color: x / Appearance: x / SG: x / pH: x  Gluc: 95 mg/dL / Ketone: x  / Bili: x / Urobili: x   Blood: x / Protein: x / Nitrite: x   Leuk Esterase: x / RBC: x / WBC x   Sq Epi: x / Non Sq Epi: x / Bacteria: x            Lactate, Blood: 0.8 mmol/L (12-29 @ 07:54)      RADIOLOGY, EKG & ADDITIONAL TESTS:

## 2024-12-29 NOTE — ED PROVIDER NOTE - PHYSICAL EXAMINATION
VITAL SIGNS: I have reviewed nursing notes and confirm.  CONSTITUTIONAL: Well appearing, in no acute distress.   SKIN:  warm and dry. See ext exam.   HEAD:  normocephalic, atraumatic.  EYES: EOM intact; conjunctiva and sclera clear.  ENT: No nasal discharge; airway clear.   NECK: Supple.  CARD: S1, S2, Regular rate and rhythm.   RESP:  Clear to auscultation b/l, no wheezes, rales or rhonchi.  ABD: Normal bowel sounds; soft; non-distended; non-tender; no guarding/ rebound.  EXT: RUE: + mild erythema, warmth, swelling to r wrist and dorsum of hand and swelling of all fingers, with scratch clara noted to radial side of wrist, mildly tender. No crepitus. No ttp to fts, or pain with passive extension of fingers. Able to range at wrist joint with mild pain. Compartments soft. < 2s cap refill. 2+ radial pulses.   NEURO: Alert, oriented, motor/ sensation intact.

## 2024-12-29 NOTE — ED ADULT NURSE NOTE - CHIEF COMPLAINT QUOTE
PCP: Damari Arnold MD    LAST OFFICE VISIT: 09/12/2024  UDS 12/19/2023  CSA: 1/2/2024       [facility]  report reviewed    The last fill date was 10/31/204 for a 30 d/s qty 30      LAST REFILL PER CHART:  Medication:methylphenidate 36 MG CR tablet   Ordered On:10/31/2024  Instructions:Take 1 tablet by mouth daily for 30 days. Max Daily Amount: 36 mg   Dispense:30 tablets  Refills:0      Future Appointments   Date Time Provider Department Center   1/2/2025  8:00 AM [facility]   10/9/2025  8:20 AM Olamide Ellis DO [facility] BS AMB       
"I was just here and discharged. I have cellulitis on my Rt arm. You need to give whatever IV antibiotics"

## 2024-12-29 NOTE — H&P ADULT - PROBLEM SELECTOR PLAN 6
Pt on home meds, zoloft 25mg qd  -c/w with home meds. Pt on home meds, zoloft 10 mg qd  -c/w with home meds. Pt on home meds, zoloft 12,5 mg qd (patient reports taking 10 mg at home but lowest dose is 25 mg)  -c/w with home meds.

## 2024-12-30 VITALS
OXYGEN SATURATION: 99 % | RESPIRATION RATE: 18 BRPM | SYSTOLIC BLOOD PRESSURE: 141 MMHG | DIASTOLIC BLOOD PRESSURE: 92 MMHG | TEMPERATURE: 97 F | HEART RATE: 76 BPM

## 2024-12-30 LAB
ANION GAP SERPL CALC-SCNC: 9 MMOL/L — SIGNIFICANT CHANGE UP (ref 5–17)
BASOPHILS # BLD AUTO: 0.02 K/UL — SIGNIFICANT CHANGE UP (ref 0–0.2)
BASOPHILS NFR BLD AUTO: 0.2 % — SIGNIFICANT CHANGE UP (ref 0–2)
BUN SERPL-MCNC: 16 MG/DL — SIGNIFICANT CHANGE UP (ref 7–23)
C TRACH RRNA SPEC QL NAA+PROBE: SIGNIFICANT CHANGE UP
CALCIUM SERPL-MCNC: 8.5 MG/DL — SIGNIFICANT CHANGE UP (ref 8.4–10.5)
CHLORIDE SERPL-SCNC: 100 MMOL/L — SIGNIFICANT CHANGE UP (ref 96–108)
CO2 SERPL-SCNC: 23 MMOL/L — SIGNIFICANT CHANGE UP (ref 22–31)
CREAT SERPL-MCNC: 0.86 MG/DL — SIGNIFICANT CHANGE UP (ref 0.5–1.3)
CRP SERPL-MCNC: 37.1 MG/L — HIGH (ref 0–4)
EGFR: 105 ML/MIN/1.73M2 — SIGNIFICANT CHANGE UP
EOSINOPHIL # BLD AUTO: 0.33 K/UL — SIGNIFICANT CHANGE UP (ref 0–0.5)
EOSINOPHIL NFR BLD AUTO: 3.7 % — SIGNIFICANT CHANGE UP (ref 0–6)
ERYTHROCYTE [SEDIMENTATION RATE] IN BLOOD: 26 MM/HR — HIGH
GLUCOSE SERPL-MCNC: 156 MG/DL — HIGH (ref 70–99)
HCT VFR BLD CALC: 41.6 % — SIGNIFICANT CHANGE UP (ref 39–50)
HGB BLD-MCNC: 13.5 G/DL — SIGNIFICANT CHANGE UP (ref 13–17)
IMM GRANULOCYTES NFR BLD AUTO: 0.3 % — SIGNIFICANT CHANGE UP (ref 0–0.9)
LYMPHOCYTES # BLD AUTO: 2.34 K/UL — SIGNIFICANT CHANGE UP (ref 1–3.3)
LYMPHOCYTES # BLD AUTO: 26.3 % — SIGNIFICANT CHANGE UP (ref 13–44)
MAGNESIUM SERPL-MCNC: 2.1 MG/DL — SIGNIFICANT CHANGE UP (ref 1.6–2.6)
MCHC RBC-ENTMCNC: 28 PG — SIGNIFICANT CHANGE UP (ref 27–34)
MCHC RBC-ENTMCNC: 32.5 G/DL — SIGNIFICANT CHANGE UP (ref 32–36)
MCV RBC AUTO: 86.3 FL — SIGNIFICANT CHANGE UP (ref 80–100)
MONOCYTES # BLD AUTO: 0.71 K/UL — SIGNIFICANT CHANGE UP (ref 0–0.9)
MONOCYTES NFR BLD AUTO: 8 % — SIGNIFICANT CHANGE UP (ref 2–14)
N GONORRHOEA RRNA SPEC QL NAA+PROBE: SIGNIFICANT CHANGE UP
NEUTROPHILS # BLD AUTO: 5.46 K/UL — SIGNIFICANT CHANGE UP (ref 1.8–7.4)
NEUTROPHILS NFR BLD AUTO: 61.5 % — SIGNIFICANT CHANGE UP (ref 43–77)
NRBC # BLD: 0 /100 WBCS — SIGNIFICANT CHANGE UP (ref 0–0)
PHOSPHATE SERPL-MCNC: 3.1 MG/DL — SIGNIFICANT CHANGE UP (ref 2.5–4.5)
PLATELET # BLD AUTO: 291 K/UL — SIGNIFICANT CHANGE UP (ref 150–400)
POTASSIUM SERPL-MCNC: 4 MMOL/L — SIGNIFICANT CHANGE UP (ref 3.5–5.3)
POTASSIUM SERPL-SCNC: 4 MMOL/L — SIGNIFICANT CHANGE UP (ref 3.5–5.3)
RBC # BLD: 4.82 M/UL — SIGNIFICANT CHANGE UP (ref 4.2–5.8)
RBC # FLD: 13.9 % — SIGNIFICANT CHANGE UP (ref 10.3–14.5)
SODIUM SERPL-SCNC: 132 MMOL/L — LOW (ref 135–145)
SPECIMEN SOURCE: SIGNIFICANT CHANGE UP
WBC # BLD: 8.89 K/UL — SIGNIFICANT CHANGE UP (ref 3.8–10.5)
WBC # FLD AUTO: 8.89 K/UL — SIGNIFICANT CHANGE UP (ref 3.8–10.5)

## 2024-12-30 PROCEDURE — 85652 RBC SED RATE AUTOMATED: CPT

## 2024-12-30 PROCEDURE — 99285 EMERGENCY DEPT VISIT HI MDM: CPT | Mod: 25

## 2024-12-30 PROCEDURE — 84100 ASSAY OF PHOSPHORUS: CPT

## 2024-12-30 PROCEDURE — 83605 ASSAY OF LACTIC ACID: CPT

## 2024-12-30 PROCEDURE — 96366 THER/PROPH/DIAG IV INF ADDON: CPT

## 2024-12-30 PROCEDURE — 76882 US LMTD JT/FCL EVL NVASC XTR: CPT

## 2024-12-30 PROCEDURE — 93971 EXTREMITY STUDY: CPT | Mod: 26,RT

## 2024-12-30 PROCEDURE — 86140 C-REACTIVE PROTEIN: CPT

## 2024-12-30 PROCEDURE — 96365 THER/PROPH/DIAG IV INF INIT: CPT

## 2024-12-30 PROCEDURE — 87040 BLOOD CULTURE FOR BACTERIA: CPT

## 2024-12-30 PROCEDURE — 93971 EXTREMITY STUDY: CPT

## 2024-12-30 PROCEDURE — 80048 BASIC METABOLIC PNL TOTAL CA: CPT

## 2024-12-30 PROCEDURE — 83735 ASSAY OF MAGNESIUM: CPT

## 2024-12-30 PROCEDURE — 99233 SBSQ HOSP IP/OBS HIGH 50: CPT

## 2024-12-30 PROCEDURE — 85025 COMPLETE CBC W/AUTO DIFF WBC: CPT

## 2024-12-30 PROCEDURE — 80053 COMPREHEN METABOLIC PANEL: CPT

## 2024-12-30 PROCEDURE — 82550 ASSAY OF CK (CPK): CPT

## 2024-12-30 PROCEDURE — 36415 COLL VENOUS BLD VENIPUNCTURE: CPT

## 2024-12-30 RX ORDER — KETOROLAC TROMETHAMINE 30 MG/ML
15 INJECTION INTRAMUSCULAR; INTRAVENOUS ONCE
Refills: 0 | Status: DISCONTINUED | OUTPATIENT
Start: 2024-12-30 | End: 2024-12-30

## 2024-12-30 RX ORDER — AZITHROMYCIN MONOHYDRATE 200 MG/5ML
1 POWDER, FOR SUSPENSION ORAL
Qty: 3 | Refills: 0
Start: 2024-12-30 | End: 2025-01-01

## 2024-12-30 RX ORDER — CEFADROXIL 500 MG
1 CAPSULE ORAL
Qty: 11 | Refills: 0
Start: 2024-12-30 | End: 2025-01-09

## 2024-12-30 RX ADMIN — AMPICILLIN SODIUM AND SULBACTAM SODIUM 200 GRAM(S): 100; 50 INJECTION, POWDER, FOR SOLUTION INTRAVENOUS at 11:53

## 2024-12-30 RX ADMIN — ENOXAPARIN SODIUM 40 MILLIGRAM(S): 60 INJECTION INTRAVENOUS; SUBCUTANEOUS at 11:53

## 2024-12-30 RX ADMIN — ACETAMINOPHEN 650 MILLIGRAM(S): 80 SOLUTION/ DROPS ORAL at 11:52

## 2024-12-30 RX ADMIN — AMPICILLIN SODIUM AND SULBACTAM SODIUM 200 GRAM(S): 100; 50 INJECTION, POWDER, FOR SOLUTION INTRAVENOUS at 01:00

## 2024-12-30 RX ADMIN — CLINDAMYCIN HYDROCHLORIDE 100 MILLIGRAM(S): 300 CAPSULE ORAL at 07:07

## 2024-12-30 RX ADMIN — SERTRALINE HYDROCHLORIDE 12.5 MILLIGRAM(S): 25 TABLET ORAL at 11:53

## 2024-12-30 RX ADMIN — CLINDAMYCIN HYDROCHLORIDE 100 MILLIGRAM(S): 300 CAPSULE ORAL at 14:55

## 2024-12-30 RX ADMIN — AZITHROMYCIN MONOHYDRATE 255 MILLIGRAM(S): 200 POWDER, FOR SUSPENSION ORAL at 13:50

## 2024-12-30 RX ADMIN — AMPICILLIN SODIUM AND SULBACTAM SODIUM 200 GRAM(S): 100; 50 INJECTION, POWDER, FOR SOLUTION INTRAVENOUS at 06:16

## 2024-12-30 RX ADMIN — KETOROLAC TROMETHAMINE 15 MILLIGRAM(S): 30 INJECTION INTRAMUSCULAR; INTRAVENOUS at 14:00

## 2024-12-30 NOTE — PROGRESS NOTE ADULT - PROBLEM SELECTOR PLAN 1
Pt meets 2/4 criteria[, WBC 13.14] and source of infection coming from right arm/forearm from cat scrach. Lactate 0.8. In ED, given 2.3 L bolus NS and Vancomycin 1g.   Xray hand and wrist: No acute fracture, osteomyelitis, soft tissue gas, or foreign body.   -Monitor vitals  -started Unasyn 3g q6h for 7 days and azithromycin 500mg q24  -started clindamycin until MRI done  - follow up MRI hand and forearm

## 2024-12-30 NOTE — DISCHARGE NOTE PROVIDER - NSDCFUSCHEDAPPT_GEN_ALL_CORE_FT
Tony Ayala  [facility]  INFDISEASE [address]  Scheduled Appointment: 01/06/2025    [facility]  HEARTVASC [address]  Scheduled Appointment: 01/10/2025    Sergei Zayas  [facility]  HEARTVASC [address]  Scheduled Appointment: 01/13/2025    Dmitri Prado  [facility]  NEUROLOGY [address]  Scheduled Appointment: 03/06/2025

## 2024-12-30 NOTE — PROGRESS NOTE ADULT - SUBJECTIVE AND OBJECTIVE BOX
ILIANA FINLEY  49y  Male      Patient is a 49y old  Male who presents with a chief complaint of RUE cellulitis (29 Dec 2024 18:36)        Notable Events:      REVIEW OF SYSTEMS:  CONSTITUTIONAL: No fever  EYES: No visual disturbances  ENMT: No hearing changes, No sore throat  RESPIRATORY: No cough, No shortness of breath  CARDIOVASCULAR: No chest pain, No palpitations  GASTROINTESTINAL: No abdominal pain, No nausea, No vomiting, No diarrhea, No melena, No hematochezia.  GENITOURINARY: No dysuria, frequency, hematuria, or incontinence  NEUROLOGICAL: No headaches, No weakness, No numbness, No tremors  SKIN: No lesions, No rashes  MUSCULOSKELETAL: No joint pain, No backpain, No extremity pain  PSYCHIATRIC: No depression, anxiety, or difficulty sleeping  FAMILY HISTORY:  Family history of hypertension in mother (Mother)    FH: heart failure (Mother, Father)    FH: myocardial infarction (Father)      Vital Signs Last 24 Hrs  T(C): 36.4 (30 Dec 2024 10:00), Max: 36.8 (29 Dec 2024 16:55)  T(F): 97.5 (30 Dec 2024 10:00), Max: 98.3 (29 Dec 2024 16:55)  HR: 81 (30 Dec 2024 10:00) (81 - 91)  BP: 113/78 (30 Dec 2024 10:00) (113/78 - 134/86)  BP(mean): 89 (30 Dec 2024 10:00) (89 - 89)  RR: 16 (30 Dec 2024 10:00) (16 - 19)  SpO2: 98% (30 Dec 2024 10:00) (97% - 98%)    Parameters below as of 30 Dec 2024 10:00  Patient On (Oxygen Delivery Method): room air      No Known Allergies      PHYSICAL EXAM:  GENERAL: No acute distress  HEAD:  Atraumatic, Normocephalic  EYES: Normal extraocular movements. Conjunctiva and sclera are normal  NECK: Supple. Normal thyroid. No lymphadenopathy  NERVOUS SYSTEM:  Alert & Oriented X3. Motor Strength 5/5 B/L upper and lower extremities; DTRs 2+ intact and symmetric.  CHEST/LUNG: No wheezing or crackles present.  CARDIAC: Regular rate and rhythm. No murmurs or rubs. Jugular venous pressure is normal.  ABDOMEN: Nontender. Nondistended. Soft abdomen.  EXTREMITIES:  2+ Peripheral Pulses, No clubbing, cyanosis, or edema.  SKIN: No rashes or lesions    Consultant(s) Notes Reviewed:  [x ] YES  [ ] NO  Care Discussed with Consultants/Other Providers [ x] YES  [ ] NO    LABS:                        13.5   8.89  )-----------( 291      ( 30 Dec 2024 09:31 )             41.6     12-30    132[L]  |  100  |  16  ----------------------------<  156[H]  4.0   |  23  |  0.86    Ca    8.5      30 Dec 2024 09:31  Phos  3.1     12-30  Mg     2.1     12-30    TPro  7.9  /  Alb  3.7  /  TBili  0.4  /  DBili  x   /  AST  See Note  /  ALT  23  /  AlkPhos  90  12-29          Urinalysis Basic - ( 30 Dec 2024 09:31 )    Color: x / Appearance: x / SG: x / pH: x  Gluc: 156 mg/dL / Ketone: x  / Bili: x / Urobili: x   Blood: x / Protein: x / Nitrite: x   Leuk Esterase: x / RBC: x / WBC x   Sq Epi: x / Non Sq Epi: x / Bacteria: x        Culture - Blood (collected 29 Dec 2024 07:54)  Source: .Blood BLOOD  Preliminary Report (30 Dec 2024 12:01):    No growth at 24 hours    Culture - Blood (collected 29 Dec 2024 07:54)  Source: .Blood BLOOD  Preliminary Report (30 Dec 2024 12:01):    No growth at 24 hours    Culture - Blood (collected 28 Dec 2024 12:37)  Source: .Blood BLOOD  Preliminary Report (29 Dec 2024 18:01):    No growth at 24 hours    Culture - Blood (collected 28 Dec 2024 12:37)  Source: .Blood BLOOD  Preliminary Report (29 Dec 2024 18:01):    No growth at 24 hours        RADIOLOGY & ADDITIONAL TESTS:    Imaging Personally Reviewed:  [x] YES  [ ] NO  abacavir 600 mG/dolutegravir 50 mG/lamivudine 300 mG 1 Tablet(s) Oral every 24 hours  acetaminophen     Tablet .. 650 milliGRAM(s) Oral every 6 hours PRN  ampicillin/sulbactam  IVPB      ampicillin/sulbactam  IVPB 3 Gram(s) IV Intermittent every 6 hours  azithromycin  IVPB 500 milliGRAM(s) IV Intermittent every 24 hours  clindamycin IVPB 600 milliGRAM(s) IV Intermittent every 8 hours  enoxaparin Injectable 40 milliGRAM(s) SubCutaneous every 24 hours  influenza   Vaccine 0.5 milliLiter(s) IntraMuscular once  ketorolac   Injectable 15 milliGRAM(s) IV Push once  losartan 50 milliGRAM(s) Oral daily  sertraline 12.5 milliGRAM(s) Oral daily      HEALTH ISSUES - PROBLEM Dx:  Sepsis    Cellulitis    Bartonella infection    HIV disease    Hypertension    Anxiety and depression    Prophylactic measure           ILIANA FINLEY  49y  Male      Patient is a 49y old  Male who presents with a chief complaint of RUE cellulitis (29 Dec 2024 18:36)      Notable Events: Resting comfortably in bed today, erythema is retracting from the margins drawn by orthopedic-hand surgery. Will continue IV antibiotics inpatient.      REVIEW OF SYSTEMS:  CONSTITUTIONAL: No fever  EYES: No visual disturbances  ENMT: No hearing changes, No sore throat  RESPIRATORY: No cough, No shortness of breath  CARDIOVASCULAR: No chest pain, No palpitations  GASTROINTESTINAL: No abdominal pain, No nausea, No vomiting, No diarrhea, No melena, No hematochezia.  GENITOURINARY: No dysuria, frequency, hematuria, or incontinence  NEUROLOGICAL: No headaches, No weakness, No numbness, No tremors  SKIN: No lesions, No rashes  MUSCULOSKELETAL: No joint pain, No backpain, No extremity pain  PSYCHIATRIC: No depression, anxiety, or difficulty sleeping  FAMILY HISTORY:  Family history of hypertension in mother (Mother)    FH: heart failure (Mother, Father)    FH: myocardial infarction (Father)      Vital Signs Last 24 Hrs  T(C): 36.4 (30 Dec 2024 10:00), Max: 36.8 (29 Dec 2024 16:55)  T(F): 97.5 (30 Dec 2024 10:00), Max: 98.3 (29 Dec 2024 16:55)  HR: 81 (30 Dec 2024 10:00) (81 - 91)  BP: 113/78 (30 Dec 2024 10:00) (113/78 - 134/86)  BP(mean): 89 (30 Dec 2024 10:00) (89 - 89)  RR: 16 (30 Dec 2024 10:00) (16 - 19)  SpO2: 98% (30 Dec 2024 10:00) (97% - 98%)    Parameters below as of 30 Dec 2024 10:00  Patient On (Oxygen Delivery Method): room air      No Known Allergies      PHYSICAL EXAM:  GENERAL: No acute distress  HEAD:  Atraumatic, Normocephalic  EYES: Normal extraocular movements. Conjunctiva and sclera are normal  NECK: Supple. Normal thyroid. No lymphadenopathy  NERVOUS SYSTEM:  Alert & Oriented X3. Motor Strength 5/5 B/L upper and lower extremities; DTRs 2+ intact and symmetric.  CHEST/LUNG: No wheezing or crackles present.  CARDIAC: Regular rate and rhythm. No murmurs or rubs. Jugular venous pressure is normal.  ABDOMEN: Nontender. Nondistended. Soft abdomen.  EXTREMITIES:  2+ Peripheral Pulses, No clubbing, cyanosis, or edema.  SKIN: No rashes or lesions    Consultant(s) Notes Reviewed:  [x ] YES  [ ] NO  Care Discussed with Consultants/Other Providers [ x] YES  [ ] NO    LABS:                        13.5   8.89  )-----------( 291      ( 30 Dec 2024 09:31 )             41.6     12-30    132[L]  |  100  |  16  ----------------------------<  156[H]  4.0   |  23  |  0.86    Ca    8.5      30 Dec 2024 09:31  Phos  3.1     12-30  Mg     2.1     12-30    TPro  7.9  /  Alb  3.7  /  TBili  0.4  /  DBili  x   /  AST  See Note  /  ALT  23  /  AlkPhos  90  12-29          Urinalysis Basic - ( 30 Dec 2024 09:31 )    Color: x / Appearance: x / SG: x / pH: x  Gluc: 156 mg/dL / Ketone: x  / Bili: x / Urobili: x   Blood: x / Protein: x / Nitrite: x   Leuk Esterase: x / RBC: x / WBC x   Sq Epi: x / Non Sq Epi: x / Bacteria: x        Culture - Blood (collected 29 Dec 2024 07:54)  Source: .Blood BLOOD  Preliminary Report (30 Dec 2024 12:01):    No growth at 24 hours    Culture - Blood (collected 29 Dec 2024 07:54)  Source: .Blood BLOOD  Preliminary Report (30 Dec 2024 12:01):    No growth at 24 hours    Culture - Blood (collected 28 Dec 2024 12:37)  Source: .Blood BLOOD  Preliminary Report (29 Dec 2024 18:01):    No growth at 24 hours    Culture - Blood (collected 28 Dec 2024 12:37)  Source: .Blood BLOOD  Preliminary Report (29 Dec 2024 18:01):    No growth at 24 hours        RADIOLOGY & ADDITIONAL TESTS:    Imaging Personally Reviewed:  [x] YES  [ ] NO  abacavir 600 mG/dolutegravir 50 mG/lamivudine 300 mG 1 Tablet(s) Oral every 24 hours  acetaminophen     Tablet .. 650 milliGRAM(s) Oral every 6 hours PRN  ampicillin/sulbactam  IVPB      ampicillin/sulbactam  IVPB 3 Gram(s) IV Intermittent every 6 hours  azithromycin  IVPB 500 milliGRAM(s) IV Intermittent every 24 hours  clindamycin IVPB 600 milliGRAM(s) IV Intermittent every 8 hours  enoxaparin Injectable 40 milliGRAM(s) SubCutaneous every 24 hours  influenza   Vaccine 0.5 milliLiter(s) IntraMuscular once  ketorolac   Injectable 15 milliGRAM(s) IV Push once  losartan 50 milliGRAM(s) Oral daily  sertraline 12.5 milliGRAM(s) Oral daily      HEALTH ISSUES - PROBLEM Dx:  Sepsis    Cellulitis    Bartonella infection    HIV disease    Hypertension    Anxiety and depression    Prophylactic measure

## 2024-12-30 NOTE — PROGRESS NOTE ADULT - PROBLEM SELECTOR PLAN 2
Right arm edematous, erythematous, and painful. Erythema marked. Concern for bartonella infection. Negative for compartment syndrome as neuromuscular intact and +2 radial pulses of RUE. .  Xray hand and wrist: No acute fracture, osteomyelitis, soft tissue gas, or foreign body.    -Monitor progression soft tissue erythema.   - Treatment as above

## 2024-12-30 NOTE — PROGRESS NOTE ADULT - ATTENDING COMMENTS
49M with PMH of HIV presenting with R hand/wrist cellulitis 2/2 cat bite.      Plan  - erythema appears to be withdrawing from original margin drawn on hand - ROM slightly better  - ESR and CRP are increasing - pending MRI   - continue with current antibiotic management   - appreciate input from hand surgery    50 minutes spent on this encounter, including face to face with patient, review of chart, care coordination and documentation. 49M with PMH of HIV presenting with R hand/wrist cellulitis 2/2 cat bite.      Plan  - erythema appears to be withdrawing from original margin drawn on hand - ROM slightly better  - ESR and CRP are increasing - pending MRI   - continue with current antibiotic management   - appreciate input from hand surgery      50 minutes spent on this encounter, including face to face with patient, review of chart, care coordination and documentation.

## 2024-12-30 NOTE — PROGRESS NOTE ADULT - ASSESSMENT
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 Vitals and labs indicative of sepsis. Pt admitted for treatment of sepsis 2/2 cellulitis 2/2 cat scratch. Concern for bartonella infection.

## 2024-12-30 NOTE — DISCHARGE NOTE PROVIDER - CARE PROVIDER_API CALL
Tony Ayala  Infectious Disease  [address]  [place] [address]  Phone: [phone]  Fax: [phone]  Established Patient  Follow Up Time: 1 week

## 2024-12-30 NOTE — PROGRESS NOTE ADULT - PROBLEM SELECTOR PLAN 6
Pt on home meds, zoloft 12,5 mg qd (patient reports taking 10 mg at home but lowest dose is 25 mg)  -c/w with home meds.

## 2024-12-30 NOTE — DISCHARGE NOTE PROVIDER - HOSPITAL COURSE
#Discharge: do not delete    Patient is __ yo M/F with past medical history of _____ presented with _____, found to have _____ (one liner)    Hospital course (by problem):     Patient was discharged to: (home/ROB/acute rehab/hospice, etc, and with what services – home health PT/RN? Home O2?)    New medications:   Changes to old medications:  Medications that were stopped:    Items to follow up as outpatient:    Physical exam at the time of discharge:

## 2024-12-30 NOTE — DISCHARGE NOTE PROVIDER - NSDCMRMEDTOKEN_GEN_ALL_CORE_FT
losartan 100 mg oral tablet: 1 tab(s) orally once a day  Triumeq oral tablet: 1 tab(s) orally once a day  Zoloft 25 mg oral tablet: 1 tab(s) orally once a day   azithromycin 500 mg oral tablet: 1 tab(s) orally  cefadroxil 1000 mg oral tablet: 1 tab(s) orally once a day  losartan 100 mg oral tablet: 1 tab(s) orally once a day  Triumeq oral tablet: 1 tab(s) orally once a day  Zoloft 25 mg oral tablet: 1 tab(s) orally once a day

## 2024-12-30 NOTE — CHART NOTE - NSCHARTNOTEFT_GEN_A_CORE
At around 5:15pm on 12/30/2024, I was alerted by the nursing staff that the patient had eloped likely within 15 minutes prior (Around 5:00pm) without being fully treated for his underlying hand infection. A call was placed to the patient with no success to let him know that I had sent medications to [facility] pharmacy to continue oral therapy for a total of 14 days. The attending physician Dr. Flavia Mares was notified of the patient eloping.

## 2024-12-31 DIAGNOSIS — M79.89 OTHER SPECIFIED SOFT TISSUE DISORDERS: ICD-10-CM

## 2024-12-31 DIAGNOSIS — L03.113 CELLULITIS OF RIGHT UPPER LIMB: ICD-10-CM

## 2025-01-02 ENCOUNTER — TRANSCRIPTION ENCOUNTER (OUTPATIENT)
Age: 50
End: 2025-01-02

## 2025-01-06 DIAGNOSIS — F41.9 ANXIETY DISORDER, UNSPECIFIED: ICD-10-CM

## 2025-01-06 DIAGNOSIS — B20 HUMAN IMMUNODEFICIENCY VIRUS [HIV] DISEASE: ICD-10-CM
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
## 2025-01-06 DIAGNOSIS — Z53.29 PROCEDURE AND TREATMENT NOT CARRIED OUT BECAUSE OF PATIENT'S DECISION FOR OTHER REASONS: ICD-10-CM

## 2025-01-06 DIAGNOSIS — A44.9 BARTONELLOSIS, UNSPECIFIED: ICD-10-CM

## 2025-01-06 DIAGNOSIS — I10 ESSENTIAL (PRIMARY) HYPERTENSION: ICD-10-CM

## 2025-01-06 DIAGNOSIS — F32.A DEPRESSION, UNSPECIFIED: ICD-10-CM

## 2025-01-06 DIAGNOSIS — Z79.899 OTHER LONG TERM (CURRENT) DRUG THERAPY: ICD-10-CM

## 2025-01-06 DIAGNOSIS — Z87.820 PERSONAL HISTORY OF TRAUMATIC BRAIN INJURY: ICD-10-CM

## 2025-01-06 DIAGNOSIS — S61.531A PUNCTURE WOUND WITHOUT FOREIGN BODY OF RIGHT WRIST, INITIAL ENCOUNTER: ICD-10-CM
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
## 2025-01-06 DIAGNOSIS — A41.9 SEPSIS, UNSPECIFIED ORGANISM: ICD-10-CM

## 2025-01-06 DIAGNOSIS — L03.113 CELLULITIS OF RIGHT UPPER LIMB: ICD-10-CM

## 2025-01-10 ENCOUNTER — APPOINTMENT (OUTPATIENT)
Dept: HEART AND VASCULAR | Facility: CLINIC | Age: 50
End: 2025-01-10
Payer: MEDICAID

## 2025-01-10 DIAGNOSIS — I10 ESSENTIAL (PRIMARY) HYPERTENSION: ICD-10-CM

## 2025-01-10 DIAGNOSIS — E78.5 HYPERLIPIDEMIA, UNSPECIFIED: ICD-10-CM

## 2025-01-10 PROCEDURE — 93306 TTE W/DOPPLER COMPLETE: CPT

## 2025-01-13 ENCOUNTER — APPOINTMENT (OUTPATIENT)
Dept: HEART AND VASCULAR | Facility: CLINIC | Age: 50
End: 2025-01-13

## 2025-01-15 ENCOUNTER — INPATIENT (INPATIENT)
Facility: HOSPITAL | Age: 50
LOS: 0 days | Discharge: HOME | End: 2025-01-15
Attending: INTERNAL MEDICINE | Admitting: STUDENT IN AN ORGANIZED HEALTH CARE EDUCATION/TRAINING PROGRAM
Payer: MEDICAID

## 2025-01-15 ENCOUNTER — TRANSCRIPTION ENCOUNTER (OUTPATIENT)
Age: 50
End: 2025-01-15

## 2025-01-15 VITALS
HEART RATE: 117 BPM | DIASTOLIC BLOOD PRESSURE: 114 MMHG | RESPIRATION RATE: 18 BRPM | OXYGEN SATURATION: 100 % | HEIGHT: 69 IN | TEMPERATURE: 98 F | WEIGHT: 173.94 LBS | SYSTOLIC BLOOD PRESSURE: 185 MMHG

## 2025-01-15 VITALS
OXYGEN SATURATION: 99 % | RESPIRATION RATE: 17 BRPM | SYSTOLIC BLOOD PRESSURE: 147 MMHG | TEMPERATURE: 97 F | HEART RATE: 92 BPM | DIASTOLIC BLOOD PRESSURE: 96 MMHG

## 2025-01-15 DIAGNOSIS — B20 HUMAN IMMUNODEFICIENCY VIRUS [HIV] DISEASE: ICD-10-CM

## 2025-01-15 DIAGNOSIS — Z29.9 ENCOUNTER FOR PROPHYLACTIC MEASURES, UNSPECIFIED: ICD-10-CM

## 2025-01-15 DIAGNOSIS — F41.9 ANXIETY DISORDER, UNSPECIFIED: ICD-10-CM

## 2025-01-15 DIAGNOSIS — I10 ESSENTIAL (PRIMARY) HYPERTENSION: ICD-10-CM

## 2025-01-15 DIAGNOSIS — L02.419 CUTANEOUS ABSCESS OF LIMB, UNSPECIFIED: ICD-10-CM

## 2025-01-15 LAB
ALBUMIN SERPL ELPH-MCNC: 3.9 G/DL — SIGNIFICANT CHANGE UP (ref 3.3–5)
ALP SERPL-CCNC: 74 U/L — SIGNIFICANT CHANGE UP (ref 40–120)
ALT FLD-CCNC: 18 U/L — SIGNIFICANT CHANGE UP (ref 10–45)
ANION GAP SERPL CALC-SCNC: 12 MMOL/L — SIGNIFICANT CHANGE UP (ref 5–17)
AST SERPL-CCNC: 19 U/L — SIGNIFICANT CHANGE UP (ref 10–40)
BILIRUB SERPL-MCNC: 0.2 MG/DL — SIGNIFICANT CHANGE UP (ref 0.2–1.2)
BUN SERPL-MCNC: 15 MG/DL — SIGNIFICANT CHANGE UP (ref 7–23)
CALCIUM SERPL-MCNC: 9.3 MG/DL — SIGNIFICANT CHANGE UP (ref 8.4–10.5)
CHLORIDE SERPL-SCNC: 107 MMOL/L — SIGNIFICANT CHANGE UP (ref 96–108)
CO2 SERPL-SCNC: 22 MMOL/L — SIGNIFICANT CHANGE UP (ref 22–31)
CREAT SERPL-MCNC: 0.92 MG/DL — SIGNIFICANT CHANGE UP (ref 0.5–1.3)
CRP SERPL-MCNC: <3 MG/L — SIGNIFICANT CHANGE UP (ref 0–4)
EGFR: 101 ML/MIN/1.73M2 — SIGNIFICANT CHANGE UP
GLUCOSE SERPL-MCNC: 88 MG/DL — SIGNIFICANT CHANGE UP (ref 70–99)
HCT VFR BLD CALC: 38.3 % — LOW (ref 39–50)
HGB BLD-MCNC: 13 G/DL — SIGNIFICANT CHANGE UP (ref 13–17)
MCHC RBC-ENTMCNC: 28.4 PG — SIGNIFICANT CHANGE UP (ref 27–34)
MCHC RBC-ENTMCNC: 33.9 G/DL — SIGNIFICANT CHANGE UP (ref 32–36)
MCV RBC AUTO: 83.6 FL — SIGNIFICANT CHANGE UP (ref 80–100)
NRBC # BLD: 0 /100 WBCS — SIGNIFICANT CHANGE UP (ref 0–0)
PLATELET # BLD AUTO: 268 K/UL — SIGNIFICANT CHANGE UP (ref 150–400)
POTASSIUM SERPL-MCNC: 3.7 MMOL/L — SIGNIFICANT CHANGE UP (ref 3.5–5.3)
POTASSIUM SERPL-SCNC: 3.7 MMOL/L — SIGNIFICANT CHANGE UP (ref 3.5–5.3)
PROT SERPL-MCNC: 7.1 G/DL — SIGNIFICANT CHANGE UP (ref 6–8.3)
RBC # BLD: 4.58 M/UL — SIGNIFICANT CHANGE UP (ref 4.2–5.8)
RBC # FLD: 14.1 % — SIGNIFICANT CHANGE UP (ref 10.3–14.5)
SODIUM SERPL-SCNC: 141 MMOL/L — SIGNIFICANT CHANGE UP (ref 135–145)
WBC # BLD: 8.05 K/UL — SIGNIFICANT CHANGE UP (ref 3.8–10.5)
WBC # FLD AUTO: 8.05 K/UL — SIGNIFICANT CHANGE UP (ref 3.8–10.5)

## 2025-01-15 PROCEDURE — 36415 COLL VENOUS BLD VENIPUNCTURE: CPT

## 2025-01-15 PROCEDURE — 99285 EMERGENCY DEPT VISIT HI MDM: CPT

## 2025-01-15 PROCEDURE — 80053 COMPREHEN METABOLIC PANEL: CPT

## 2025-01-15 PROCEDURE — 86140 C-REACTIVE PROTEIN: CPT

## 2025-01-15 PROCEDURE — 87205 SMEAR GRAM STAIN: CPT

## 2025-01-15 PROCEDURE — 87075 CULTR BACTERIA EXCEPT BLOOD: CPT

## 2025-01-15 PROCEDURE — 73110 X-RAY EXAM OF WRIST: CPT

## 2025-01-15 PROCEDURE — 99236 HOSP IP/OBS SAME DATE HI 85: CPT | Mod: GC

## 2025-01-15 PROCEDURE — 85027 COMPLETE CBC AUTOMATED: CPT

## 2025-01-15 PROCEDURE — 73110 X-RAY EXAM OF WRIST: CPT | Mod: 26,RT

## 2025-01-15 PROCEDURE — 87040 BLOOD CULTURE FOR BACTERIA: CPT

## 2025-01-15 PROCEDURE — 87070 CULTURE OTHR SPECIMN AEROBIC: CPT

## 2025-01-15 RX ORDER — INFLUENZA A VIRUS A/WISCONSIN/588/2019 (H1N1) RECOMBINANT HEMAGGLUTININ ANTIGEN, INFLUENZA A VIRUS A/DARWIN/6/2021 (H3N2) RECOMBINANT HEMAGGLUTININ ANTIGEN, INFLUENZA B VIRUS B/AUSTRIA/1359417/2021 RECOMBINANT HEMAGGLUTININ ANTIGEN, AND INFLUENZA B VIRUS B/PHUKET/3073/2013 RECOMBINANT HEMAGGLUTININ ANTIGEN 45; 45; 45; 45 UG/.5ML; UG/.5ML; UG/.5ML; UG/.5ML
0.5 INJECTION INTRAMUSCULAR ONCE
Refills: 0 | Status: DISCONTINUED | OUTPATIENT
Start: 2025-01-15 | End: 2025-01-15

## 2025-01-15 RX ORDER — IBUPROFEN 200 MG
600 TABLET ORAL EVERY 8 HOURS
Refills: 0 | Status: DISCONTINUED | OUTPATIENT
Start: 2025-01-15 | End: 2025-01-15

## 2025-01-15 RX ORDER — AMPICILLIN SODIUM AND SULBACTAM SODIUM 100; 50 MG/ML; MG/ML
1.5 INJECTION, POWDER, FOR SOLUTION INTRAVENOUS EVERY 6 HOURS
Refills: 0 | Status: DISCONTINUED | OUTPATIENT
Start: 2025-01-15 | End: 2025-01-15

## 2025-01-15 RX ORDER — AMOXICILLIN/POTASSIUM CLAV 875-125 MG
875 TABLET ORAL
Qty: 19 | Refills: 0
Start: 2025-01-15 | End: 2025-01-24

## 2025-01-15 RX ORDER — ACETAMINOPHEN 80 MG/.8ML
1000 SOLUTION/ DROPS ORAL ONCE
Refills: 0 | Status: COMPLETED | OUTPATIENT
Start: 2025-01-15 | End: 2025-01-15

## 2025-01-15 RX ORDER — ENOXAPARIN SODIUM 60 MG/.6ML
40 INJECTION INTRAVENOUS; SUBCUTANEOUS EVERY 24 HOURS
Refills: 0 | Status: DISCONTINUED | OUTPATIENT
Start: 2025-01-15 | End: 2025-01-15

## 2025-01-15 RX ORDER — ACETAMINOPHEN 80 MG/.8ML
2 SOLUTION/ DROPS ORAL
Qty: 60 | Refills: 0
Start: 2025-01-15 | End: 2025-01-24

## 2025-01-15 RX ORDER — AZITHROMYCIN MONOHYDRATE 200 MG/5ML
500 POWDER, FOR SUSPENSION ORAL ONCE
Refills: 0 | Status: COMPLETED | OUTPATIENT
Start: 2025-01-15 | End: 2025-01-15

## 2025-01-15 RX ORDER — SERTRALINE HYDROCHLORIDE 25 MG/1
25 TABLET ORAL DAILY
Refills: 0 | Status: DISCONTINUED | OUTPATIENT
Start: 2025-01-15 | End: 2025-01-15

## 2025-01-15 RX ORDER — LOSARTAN POTASSIUM 100 MG/1
100 TABLET, FILM COATED ORAL DAILY
Refills: 0 | Status: DISCONTINUED | OUTPATIENT
Start: 2025-01-15 | End: 2025-01-15

## 2025-01-15 RX ORDER — AMPICILLIN SODIUM AND SULBACTAM SODIUM 100; 50 MG/ML; MG/ML
1.5 INJECTION, POWDER, FOR SOLUTION INTRAVENOUS ONCE
Refills: 0 | Status: COMPLETED | OUTPATIENT
Start: 2025-01-15 | End: 2025-01-15

## 2025-01-15 RX ORDER — IBUPROFEN 200 MG
1 TABLET ORAL
Qty: 30 | Refills: 0
Start: 2025-01-15 | End: 2025-01-24

## 2025-01-15 RX ORDER — ACETAMINOPHEN 80 MG/.8ML
1000 SOLUTION/ DROPS ORAL ONCE
Refills: 0 | Status: DISCONTINUED | OUTPATIENT
Start: 2025-01-15 | End: 2025-01-15

## 2025-01-15 RX ADMIN — ACETAMINOPHEN 1000 MILLIGRAM(S): 80 SOLUTION/ DROPS ORAL at 11:44

## 2025-01-15 RX ADMIN — AMPICILLIN SODIUM AND SULBACTAM SODIUM 100 GRAM(S): 100; 50 INJECTION, POWDER, FOR SOLUTION INTRAVENOUS at 10:44

## 2025-01-15 RX ADMIN — AMPICILLIN SODIUM AND SULBACTAM SODIUM 200 GRAM(S): 100; 50 INJECTION, POWDER, FOR SOLUTION INTRAVENOUS at 01:49

## 2025-01-15 RX ADMIN — ENOXAPARIN SODIUM 40 MILLIGRAM(S): 60 INJECTION INTRAVENOUS; SUBCUTANEOUS at 06:23

## 2025-01-15 RX ADMIN — SERTRALINE HYDROCHLORIDE 25 MILLIGRAM(S): 25 TABLET ORAL at 14:06

## 2025-01-15 RX ADMIN — ACETAMINOPHEN 400 MILLIGRAM(S): 80 SOLUTION/ DROPS ORAL at 10:44

## 2025-01-15 RX ADMIN — AMPICILLIN SODIUM AND SULBACTAM SODIUM 100 GRAM(S): 100; 50 INJECTION, POWDER, FOR SOLUTION INTRAVENOUS at 15:12

## 2025-01-15 RX ADMIN — AZITHROMYCIN MONOHYDRATE 255 MILLIGRAM(S): 200 POWDER, FOR SUSPENSION ORAL at 02:42

## 2025-01-15 RX ADMIN — LOSARTAN POTASSIUM 100 MILLIGRAM(S): 100 TABLET, FILM COATED ORAL at 10:51

## 2025-01-15 NOTE — PATIENT PROFILE ADULT - FUNCTIONAL ASSESSMENT - BASIC MOBILITY 6.
4-calculated by average/Not able to assess (calculate score using [facility] averaging method)

## 2025-01-15 NOTE — H&P ADULT - ATTENDING COMMENTS
seen and examined by me on 1/15/25   discussed with Ortho who will perform I and D   F/u Cultures   start Augmentin post I and D   Rest of the plan per Resident note

## 2025-01-15 NOTE — H&P ADULT - PROBLEM SELECTOR PLAN 3
Pt on home meds, zoloft 12,5 mg qd (patient reports taking 10 mg at home but lowest dose is 25 mg)      - c/w Zoloft _____ On losartan 100mg qd. Hypertensive on admission.   States is also on HCTZ 10mg (not a true dose)    - c/w losartan 100mg qd  - med rec (HCTZ 12.5 On losartan 100mg qd. Hypertensive on admission.   States is also on HCTZ 10mg (not a true dose)    - c/w losartan 100mg qd  - med rec (HCTZ 12.5mg or 100mg?) On losartan 100mg qd. Hypertensive on admission.   States is also on HCTZ 10mg (not a true dose)    Plan:  - C/w losartan 100mg qd  - med rec (HCTZ 12.5mg or 100mg?) On losartan 100mg qd. Hypertensive on admission.   Pt states is also on HCTZ 10mg (not a true dose)?    Plan:  - C/w losartan 100mg qd  - med rec (HCTZ 12.5mg or 100mg?)

## 2025-01-15 NOTE — ED ADULT TRIAGE NOTE - CHIEF COMPLAINT QUOTE
Pt w swelling/purple discoloration to cat bite on R inner wrist. States has been seen for this problem multiple times in the past. Not currently on abx. 06-Mar-2023 09:33

## 2025-01-15 NOTE — DISCHARGE NOTE PROVIDER - HOSPITAL COURSE
#Discharge: do not delete    ILIANA FINLEY is a 49M with PMHx of HTN, HIV, depression/anxiety, and recent admission for RUE cellulitis 2/2 cat scratch/bite who presents c/o worsening erythema on R wrist. Pt was previously admitted to [facility] 12/29-12/30  finished po antibiotics for cellulitis (azithromycin and cefadroxil) approximately 3-4 days ago. 2 days ago, he noticed swelling over the radial styloid with overlying erythema. Pt sirisha margins around the erythema and it started to spread bilaterally, prompting him to come to the ED. Pt has no systemic sxs and otherwise feels well, states he has been at baseline, going to the gym since leaving prior.     Problem List/Main Diagnoses (system-based):   #     #     #    Inpatient treatment course:     New medications:   Labs to be followed outpatient:   Exam to be followed outpatient:       LABS & STUDIES:   #Discharge: do not delete    ILIANA FINLEY is a 49M with PMHx of HTN, HIV, depression/anxiety, and recent admission for RUE cellulitis 2/2 cat scratch/bite who presents c/o worsening erythema on R wrist and expanding margins of the swelling over the radial styloid since 2 days. Pt was previously admitted to [facility] 12/29-12/30  finished po antibiotics for cellulitis (azithromycin and cefadroxil) approximately 3-4 days ago. On presentation, physical exam consistent with abscess, firm but slightly fluctuant, erythema over abscess and circumferentially surrounding with no signs of sepsis or systemic sxs managed with unasyn 1.5G Q6h. Ortho was consulted for incision and drainage given the likelihood that the infection walled off and now needs for source control. Pt is s/p I&D on 1/15    Problem List/Main Diagnoses (system-based):   #     #     #    Inpatient treatment course:     New medications:   Labs to be followed outpatient:   Exam to be followed outpatient:       LABS & STUDIES:   #Discharge: do not delete    ILIANA FINLEY is a 49M with PMHx of HTN, HIV, depression/anxiety, and recent admission for RUE cellulitis 2/2 cat scratch/bite who presents c/o worsening erythema on R wrist and expanding margins of the swelling over the radial styloid since 2 days. Pt was previously admitted to [facility] 12/29-12/30  finished po antibiotics for cellulitis (azithromycin and cefadroxil) approximately 3-4 days ago. On presentation, physical exam consistent with abscess, firm but slightly fluctuant, erythema over abscess and circumferentially surrounding with no signs of sepsis or systemic sxs managed with unasyn 1.5G Q6h. Ortho was consulted for incision and drainage given the likelihood that the infection walled off and now needs for source control. Pt is s/p I&D on 1/15    Problem List/Main Diagnoses (system-based):   # Right Distal Fore Arm abscess   - patient was treated with po cefadroxil + azithromycin for cellulitis of right fore arm , cellulitis improved but there was a small abscess in the distal forearm , patient underwent I and D by Ortho on 1/15. Ideally we should wait for cultures for appropriate oral anbx therapy but patient wants to leave the hospital today to catch a flight at 8 pm , He is hemodynamically stable , does not have any signs of systemic infection and due to is preference being discharged on oral Augmentin for 10 days       # HIV - Continue Triumeq     # HTN - Continue Losartan     # Depression - Continue Sertraline       New medications: Augmentin 875-125mg po BID for 10 days     Labs to be followed outpatient: F/u Surgical Cultures     Exam to be followed outpatient:          #Discharge: do not delete    ILIANA FINLEY is a 49M with PMHx of HTN, HIV, depression/anxiety, and recent admission for RUE cellulitis 2/2 cat scratch/bite who presents c/o worsening erythema on R wrist and expanding margins of the swelling over the radial styloid since 2 days. Pt was previously admitted to [facility] 12/29-12/30  finished po antibiotics for cellulitis (azithromycin and cefadroxil) approximately 3-4 days ago. On presentation, physical exam consistent with abscess, firm but slightly fluctuant, erythema over abscess and circumferentially surrounding with no signs of sepsis or systemic sxs managed with unasyn 1.5G Q6h. Ortho was consulted for incision and drainage given the likelihood that the infection walled off and now needs for source control. Pt is s/p I&D on 1/15 and is being discharged with advice to continue antibiotics and visit ER in case of worsening swelling, erythema, fever or discharge.    Problem List/Main Diagnoses (system-based):   # Right Distal Fore Arm abscess   - patient was treated with po cefadroxil + azithromycin for cellulitis of right fore arm , cellulitis improved but there was a small abscess in the distal forearm , patient underwent I and D by Ortho on 1/15. Ideally we should wait for cultures for appropriate oral anbx therapy but patient wants to leave the hospital today to catch a flight at 8 pm , He is hemodynamically stable , does not have any signs of systemic infection and due to is preference being discharged on oral Augmentin for 10 days       # HIV - Continue Triumeq     # HTN - Continue Losartan     # Depression - Continue Sertraline       New medications: Augmentin 875-125mg po BID for 10 days     Labs to be followed outpatient: F/u Surgical Cultures     Exam to be followed outpatient:   General: in no acute distress  HEENT: head NCAT, eyes EOMI, PERRLA, no conjunctival pallor, no scleral icterus, moist mucous membranes  Pulmonary: lung fields CTAB, no wheezing, rales, or rhonchi  Cardiovascular: RRR, normal S1/S2, no m/r/g,   Abdominal: soft, NTND, +BS, no hepatosplenomegaly  Extremities: no peripheral edema or cyanosis, pulses 2+ in upper and lower extremities b/l, no restriction of motion at R wrist   Skin: warm, dry, no visible jaundice, no new rashes, s/p I&D with overlying dressing         #Discharge: do not delete    ILIANA FINLEY is a 49M with PMHx of HTN, HIV, depression/anxiety, and recent admission for RUE cellulitis 2/2 cat scratch/bite who presents c/o worsening erythema on R wrist and expanding margins of the swelling over the radial styloid since 2 days. Pt was previously admitted to [facility] 12/29-12/30  finished po antibiotics for cellulitis (azithromycin and cefadroxil) approximately 3-4 days ago. On presentation, physical exam consistent with abscess, firm but slightly fluctuant, erythema over abscess and circumferentially surrounding with no signs of sepsis or systemic sxs managed with unasyn 1.5G Q6h. Ortho was consulted for incision and drainage given the likelihood that the infection walled off and now needs for source control. Pt is s/p I&D on 1/15 and is being discharged with advice to continue antibiotics and visit ER in case of worsening swelling, erythema, fever or discharge.    Problem List/Main Diagnoses (system-based):   # Right Distal Fore Arm abscess   - patient was treated with po cefadroxil + azithromycin for cellulitis of right fore arm , cellulitis improved but there was a small abscess in the distal forearm , patient underwent I and D by Ortho on 1/15. Ideally we should wait for cultures for appropriate oral anbx therapy but patient wants to leave the hospital today to catch a flight at 8 pm , He is hemodynamically stable , does not have any signs of systemic infection and due to is preference being discharged on oral Augmentin for 10 days.  - Wound Care: Keep the incision site clean and dry. Remove the packing and do daily betadine Wet-to-dry dressing. Change dressing if dressing is soaked. Keep arm dry during bathing. Follow up with your doctor in 5-7 days      # HIV - Continue Triumeq     # HTN - Continue Losartan     # Depression - Continue Sertraline       New medications: Augmentin 875-125mg po BID for 10 days   Wound Care: Keep the incision site clean and dry. Remove the packing and do daily betadine Wet-to-dry dressing. Change dressing if dressing is soaked. Keep arm dry during bathing.     Labs to be followed outpatient: F/u Surgical Cultures     Exam to be followed outpatient:   General: in no acute distress  HEENT: head NCAT, eyes EOMI, PERRLA, no conjunctival pallor, no scleral icterus, moist mucous membranes  Pulmonary: lung fields CTAB, no wheezing, rales, or rhonchi  Cardiovascular: RRR, normal S1/S2, no m/r/g,   Abdominal: soft, NTND, +BS, no hepatosplenomegaly  Extremities: no peripheral edema or cyanosis, pulses 2+ in upper and lower extremities b/l, no restriction of motion at R wrist   Skin: warm, dry, no visible jaundice, no new rashes, s/p I&D with overlying dressing         #Discharge: do not delete    ILIANA FINLEY is a 49M with PMHx of HTN, HIV, depression/anxiety, and recent admission for RUE cellulitis 2/2 cat scratch/bite who presents c/o worsening erythema on R wrist and expanding margins of the swelling over the radial styloid since 2 days. Pt was previously admitted to [facility] 12/29-12/30  finished po antibiotics for cellulitis (azithromycin and cefadroxil) approximately 3-4 days ago. On presentation, physical exam consistent with abscess, firm but slightly fluctuant, erythema over abscess and circumferentially surrounding with no signs of sepsis or systemic sxs managed with unasyn 1.5G Q6h. Ortho was consulted for incision and drainage given the likelihood that the infection walled off and now needs for source control. Pt is s/p I&D on 1/15 and is being discharged with advice to continue antibiotics and visit ER in case of worsening swelling, erythema, fever or discharge.    Problem List/Main Diagnoses (system-based):   # Right Distal Forearm abscess   - patient was treated with po cefadroxil + azithromycin for cellulitis of right fore arm , cellulitis improved but there was a small abscess in the distal forearm , patient underwent I and D by Ortho on 1/15. Ideally we should wait for cultures for appropriate oral anbx therapy but patient wants to leave the hospital today to catch a flight at 8 pm , He is hemodynamically stable , does not have any signs of systemic infection and due to is preference being discharged on oral Augmentin for 10 days.  - Wound Care: Keep the incision site clean and dry. Remove the packing and do daily betadine Wet-to-dry dressing. Change dressing if dressing is soaked. Keep arm dry during bathing. Follow up with your doctor in 5-7 days      # HIV - Continue Triumeq     # HTN - Continue Losartan     # Depression - Continue Sertraline       New medications: Augmentin 875-125mg po BID for 10 days   Wound Care: Keep the incision site clean and dry. Remove the packing and do daily betadine Wet-to-dry dressing. Change dressing if dressing is soaked. Keep arm dry during bathing.     Labs to be followed outpatient: F/u Surgical Cultures     Exam to be followed outpatient:   General: in no acute distress  HEENT: head NCAT, eyes EOMI, PERRLA, no conjunctival pallor, no scleral icterus, moist mucous membranes  Pulmonary: lung fields CTAB, no wheezing, rales, or rhonchi  Cardiovascular: RRR, normal S1/S2, no m/r/g,   Abdominal: soft, NTND, +BS, no hepatosplenomegaly  Extremities: no peripheral edema or cyanosis, pulses 2+ in upper and lower extremities b/l, no restriction of motion at R wrist   Skin: warm, dry, no visible jaundice, no new rashes, s/p I&D with overlying dressing

## 2025-01-15 NOTE — ED PROVIDER NOTE - CADM POA CENTRAL LINE
CC: Ultrasound follow-up  Patient is seen after being evaluated for menorrhagia with symptomatic anemia.  Her hemoglobin was 8.4 when we checked it.  She had a normal TSH.  Today's ultrasound shows a slightly enlarged uterus with a large submucous fibroid.  We discussed the nature of the findings and options of hormone management versus hysteroscopic myomectomy versus definitive management in the form of hysterectomy.  Patient desires to proceed with hysterectomy and salpingectomy with ovarian conservation.  Risks of bleeding, infection, injury to adjacent organs were all discussed.  Assessment: Fibroid uterus with heavy menstrual bleeding and resultant anemia  Plan: We will prescribe Aygestin 5 mg daily until total laparoscopic hysterectomy is scheduled.  I spent 20 minutes caring for Rina on this date of service. This time includes time spent by me in the following activities: preparing for the visit, reviewing tests, obtaining and/or reviewing a separately obtained history, counseling and educating the patient/family/caregiver, ordering medications, tests, or procedures and documenting information in the medical record     No

## 2025-01-15 NOTE — ED ADULT NURSE NOTE - OBJECTIVE STATEMENT
48 y/o male presents to the ED c/o cat bite to R wrist. Denies CP, SOB, HA, F/C, N/V/D, weakness, dizziness, and any other complaints at this time. On exam A&Ox4, RA, ambulatory, NAD. Speaking in clear coherent sentences, respirations are spontaneous and unlabored.

## 2025-01-15 NOTE — DISCHARGE NOTE PROVIDER - NSDCCPCAREPLAN_GEN_ALL_CORE_FT
PRINCIPAL DISCHARGE DIAGNOSIS  Diagnosis: Abscess, wrist  Assessment and Plan of Treatment: Cellulitis is an infection of the skin. This infection can cause redness, pain, and swelling. Cellulitis tends to affect deep layers of skin and sometimes the fat under the skin. This condition can happen when germs get into the skin. Normally, different types of germs live on a person's skin, and mostly these germs do not cause any problems. But if a person gets a cut or break in the skin, the germs can penetrate and cause an infection. Certain conditions can increase a person's chance of getting cellulitis. These include: having a cut (even a tiny one), having another type of skin infection or a long-term skin condition, swelling of the skin or swelling in the body, and being overweight.       PRINCIPAL DISCHARGE DIAGNOSIS  Diagnosis: Abscess, wrist  Assessment and Plan of Treatment: Cellulitis is an infection of the skin. This infection can cause redness, pain, and swelling. Cellulitis tends to affect deep layers of skin and sometimes the fat under the skin. This condition can happen when germs get into the skin. Normally, different types of germs live on a person's skin, and mostly these germs do not cause any problems. But if a person gets a cut or break in the skin, the germs can penetrate and cause an infection. Certain conditions can increase a person's chance of getting cellulitis. These include: having a cut (even a tiny one), having another type of skin infection or a long-term skin condition, swelling of the skin or swelling in the body, and being overweight.  ------------------------------------------------------------------  Medications:  Take tab augmentin 875-1125mg twice a day for 10 days till 1/24  Wound Care: Keep the incision site clean and dry.   Activity: Resume normal activities as tolerated, avoiding strenuous activities that may impact the healing of the incision site.  Follow-Up: Follow up with your primary care physician. Additionally, monitor for culture results to adjust antibiotic therapy if necessary.  When to Seek Medical Attention: Return to the emergency room or contact your healthcare provider if you experience worsening swelling, increased redness, fever, or discharge from the incision site.     PRINCIPAL DISCHARGE DIAGNOSIS  Diagnosis: Abscess, wrist  Assessment and Plan of Treatment: Cellulitis is an infection of the skin. This infection can cause redness, pain, and swelling. Cellulitis tends to affect deep layers of skin and sometimes the fat under the skin. This condition can happen when germs get into the skin. Normally, different types of germs live on a person's skin, and mostly these germs do not cause any problems. But if a person gets a cut or break in the skin, the germs can penetrate and cause an infection. Certain conditions can increase a person's chance of getting cellulitis. These include: having a cut (even a tiny one), having another type of skin infection or a long-term skin condition, swelling of the skin or swelling in the body, and being overweight.  ------------------------------------------------------------------  Medications:  Take tab augmentin 875-1125mg twice a day for 10 days till 1/24  Wound Care: Keep the incision site clean and dry. Wet-to-dry dressing daily. Change dressing if dressing is soaked. Keep arm dry during bathing.  Activity: Resume normal activities as tolerated, avoiding strenuous activities that may impact the healing of the incision site.  Follow-Up: Follow up with your primary care physician. Additionally, monitor for culture results to adjust antibiotic therapy if necessary.  When to Seek Medical Attention: Return to the emergency room or contact your healthcare provider if you experience worsening swelling, increased redness, fever, or discharge from the incision site.     PRINCIPAL DISCHARGE DIAGNOSIS  Diagnosis: Abscess, wrist  Assessment and Plan of Treatment: Cellulitis is an infection of the skin. This infection can cause redness, pain, and swelling. Cellulitis tends to affect deep layers of skin and sometimes the fat under the skin. This condition can happen when germs get into the skin. Normally, different types of germs live on a person's skin, and mostly these germs do not cause any problems. But if a person gets a cut or break in the skin, the germs can penetrate and cause an infection. Certain conditions can increase a person's chance of getting cellulitis. These include: having a cut (even a tiny one), having another type of skin infection or a long-term skin condition, swelling of the skin or swelling in the body, and being overweight.  ------------------------------------------------------------------  Medications:  Take tab augmentin 875-1125mg twice a day for 10 days till 1/24  Wound Care: Keep the incision site clean and dry. Remove the packing and do daily betadine Wet-to-dry dressing. Change dressing if dressing is soaked. Keep arm dry during bathing.   Activity: Resume normal activities as tolerated, avoiding strenuous activities that may impact the healing of the incision site.  Follow-Up: Follow up with your primary care physician and get the wound checked out in 5-7 days. Additionally, monitor for culture results to adjust antibiotic therapy if necessary.  When to Seek Medical Attention: Return to the emergency room or contact your healthcare provider if you experience worsening swelling, increased redness, fever, or discharge from the incision site.

## 2025-01-15 NOTE — H&P ADULT - PROBLEM SELECTOR PLAN 4
Plan:  F: none  E: replete K<4, Mg<2  N: DASH/TLC  VTE Prophylaxis: Lovenox 40mg qd  GI: None  C: Full Code  D: F Pt on home meds, zoloft 12,5 mg qd (patient reports taking 10 mg at home but lowest dose is 25 mg)      - c/w Zoloft _____ On Zoloft 25mg qd.     - c/w home med On sertraline 25mg qd.     Plan:  - C/w sertraline

## 2025-01-15 NOTE — DISCHARGE NOTE NURSING/CASE MANAGEMENT/SOCIAL WORK - PATIENT PORTAL LINK FT
You can access the FollowMyHealth Patient Portal offered by [facility] by registering at the following website: [email or web address]. By joining [email or web address]’s FollowMyHealth portal, you will also be able to view your health information using other applications (apps) compatible with our system.

## 2025-01-15 NOTE — ED ADULT NURSE NOTE - CHIEF COMPLAINT QUOTE
Pt w swelling/purple discoloration to cat bite on R inner wrist. States has been seen for this problem multiple times in the past. Not currently on abx.

## 2025-01-15 NOTE — H&P ADULT - PROBLEM SELECTOR PLAN 5
Plan:  F: none  E: replete K<4, Mg<2  N: DASH/TLC  VTE Prophylaxis: Lovenox 40mg qd  GI: None  C: Full Code  D: F

## 2025-01-15 NOTE — DISCHARGE NOTE PROVIDER - NSDCFUSCHEDAPPT_GEN_ALL_CORE_FT
Tony Ayala  [facility]  INFDISEASE [address]  Scheduled Appointment: 01/16/2025    [facility]  CATSCAN  [address]  Scheduled Appointment: 02/06/2025    Dmitri Prado  [facility]  NEUROLOGY [address]  Scheduled Appointment: 03/06/2025     [facility]  CATSCAN  [address]  Scheduled Appointment: 02/06/2025    Dmitri Prado  [facility]  NEUROLOGY [address]  Scheduled Appointment: 03/06/2025

## 2025-01-15 NOTE — DISCHARGE NOTE PROVIDER - NSDCMRMEDTOKEN_GEN_ALL_CORE_FT
losartan 100 mg oral tablet: 1 tab(s) orally once a day  Triumeq oral tablet: 1 tab(s) orally once a day  Zoloft 25 mg oral tablet: 1 tab(s) orally once a day   amoxicillin-clavulanate 875 mg-125 mg oral tablet: 875 milligram(s) orally 2 times a day take 1 tablet twice a day for 10 days till 1/24  ibuprofen 600 mg oral tablet: 1 tab(s) orally every 8 hours as needed for Severe Pain (7 - 10) take 1 tablet every 8hours as needed for severe pain  losartan 100 mg oral tablet: 1 tab(s) orally once a day  Triumeq oral tablet: 1 tab(s) orally once a day  Tylenol Extended Release 650 mg oral tablet, extended release: 2 tab(s) orally every 8 hours as needed for  moderate pain take 1 tablet every 8 hours as needed for moderate pain  Zoloft 25 mg oral tablet: 1 tab(s) orally once a day

## 2025-01-15 NOTE — DISCHARGE NOTE NURSING/CASE MANAGEMENT/SOCIAL WORK - FINANCIAL ASSISTANCE
[facility] provides services at a reduced cost to those who are determined to be eligible through [facility]’s financial assistance program. Information regarding [facility]’s financial assistance program can be found by going to [email or web address] or by calling [phone].

## 2025-01-15 NOTE — ED ADULT TRIAGE NOTE - SOURCE OF INFORMATION
Patient MEDICATIONS  (STANDING):  acetaminophen     Tablet .. 975 milliGRAM(s) Oral every 6 hours  amLODIPine   Tablet 5 milliGRAM(s) Oral daily  heparin   Injectable 5000 Unit(s) SubCutaneous every 8 hours  lactated ringers. 1000 milliLiter(s) (104 mL/Hr) IV Continuous <Continuous>  losartan 50 milliGRAM(s) Oral daily  potassium phosphate IVPB 15 milliMole(s) IV Intermittent once    MEDICATIONS  (PRN):  ibuprofen  Tablet. 600 milliGRAM(s) Oral every 6 hours PRN Mild Pain (1 - 3)  ondansetron Injectable 4 milliGRAM(s) IV Push every 6 hours PRN Nausea and/or Vomiting  oxyCODONE    IR 10 milliGRAM(s) Oral every 4 hours PRN Severe Pain (7 - 10)  oxyCODONE    IR 5 milliGRAM(s) Oral every 4 hours PRN Moderate Pain (4 - 6)

## 2025-01-15 NOTE — H&P ADULT - NSHPPHYSICALEXAM_GEN_ALL_CORE
T(C): 36.7 (01-15-25 @ 03:39), Max: 36.9 (01-15-25 @ 00:22)  HR: 100 (01-15-25 @ 03:39) (100 - 117)  BP: 154/104 (01-15-25 @ 03:39) (154/104 - 185/114)  RR: 16 (01-15-25 @ 03:39) (16 - 18)  SpO2: 100% (01-15-25 @ 03:39) (100% - 100%)    PHYSICAL EXAM:  General: AAOx3, no acute distress, laying in bed comfortably  HEENT: head NCAT, eyes EOMI, PERRLA, no conjunctival pallor, no scleral icterus, no oropharyngeal exudates, erythema, or lesions, moist mucous membranes  Neck: supple, non-tender, no cervical LAD, no thyromegaly  Pulmonary: lung fields CTAB, no wheezing, rales, or rhonchi, no tenderness to palpation, no dullness to percussion, tactile fremitus WNL  Cardiovascular: RRR, normal S1/S2, no m/r/g, no JVD, no carotid bruits b/l, PMI not displaced  Abdominal: soft, NTND, +BS, no hepatosplenomegaly, no CVA tenderness, no masses, no bruits  Genitourinary:  Extremities: no peripheral edema or cyanosis, pulses 2+ in upper and lower extremities b/l, capillary refill < 2 sec.  Neuro: CN II-XII grossly intact and symmetric b/l, motor, sensory, coordination grossly intact in all extremities, DTR 2+ and symmetric b/l  Skin: warm, dry, no visible jaundice, no new rashes T(C): 36.7 (01-15-25 @ 03:39), Max: 36.9 (01-15-25 @ 00:22)  HR: 100 (01-15-25 @ 03:39) (100 - 117)  BP: 154/104 (01-15-25 @ 03:39) (154/104 - 185/114)  RR: 16 (01-15-25 @ 03:39) (16 - 18)  SpO2: 100% (01-15-25 @ 03:39) (100% - 100%)    PHYSICAL EXAM:  General: AAOx3, no acute distress, laying in bed comfortably  HEENT: head NCAT, eyes EOMI, PERRLA, no conjunctival pallor, no scleral icterus, no oropharyngeal exudates, erythema, or lesions, moist mucous membranes  Pulmonary: lung fields CTAB, no wheezing, rales, or rhonchi  Cardiovascular: RRR, normal S1/S2, no m/r/g, no JVD  Abdominal: soft, NTND, +BS, no hepatosplenomegaly  Extremities: no peripheral edema or cyanosis, pulses 2+ in upper and lower extremities b/l, no restriction of motion at R wrist   Skin: warm, dry, no visible jaundice, no new rashes, area of edema/erythema and fluctuance over R radial styloid with surrounding erythema slightly warm to touch

## 2025-01-15 NOTE — ED PROVIDER NOTE - OBJECTIVE STATEMENT
49M with PMHx of HIV, depression/anxiety, hypertension, and recent admission for cat-scratch/bite who presents for R forearm/hand swelling and pain in setting of cat-scratch/bite, admitted for RUE cellulitis.Eloped after 1 day of IV clinda Unasyn and azithromycin did receive Vanco December 29, 2024 went home on oral antibiotics which she finished 3 to 4 days ago return with right wrist swelling and erythema which started 2 days ago without associated fever or pain with movement of his wrist.  Was seen by Ortho hand Dr. Harper and team.  No septic arthritis at that time.

## 2025-01-15 NOTE — ED PROVIDER NOTE - PHYSICAL EXAMINATION
VITAL SIGNS: I have reviewed nursing notes and confirm.  CONSTITUTIONAL: Well appearing, in no acute distress.   SKIN:  warm and dry, no acute rash.   HEAD:  normocephalic, atraumatic.  EYES: EOM intact; conjunctiva and sclera clear.  ENT: No nasal discharge; airway clear.   NECK: Supple.  CARD: S1, S2, Regular rate and rhythm.   RESP:  Clear to auscultation b/l, no wheezes, rales or rhonchi.  ABD: Normal bowel sounds; soft; non-distended; non-tender; no guarding/ rebound.  EXT: Normal ROM. No peripheral edema. Pulses intact and equal b/l.Right distal radius erythema fluctuance no pain with range of motion at distal right radius no bony tenderness underneath fluctuant mass  NEURO: Alert, oriented, grossly unremarkable  PSYCH: Cooperative, mood and affect appropriate.

## 2025-01-15 NOTE — H&P ADULT - NSHPLABSRESULTS_GEN_ALL_CORE
.  LABS:                         13.0   8.05  )-----------( 268      ( 15 Jose Alejandro 2025 01:28 )             38.3     01-15    141  |  107  |  15  ----------------------------<  88  3.7   |  22  |  0.92    Ca    9.3      15 Jose Alejandro 2025 01:28    TPro  7.1  /  Alb  3.9  /  TBili  0.2  /  DBili  x   /  AST  19  /  ALT  18  /  AlkPhos  74  01-15      Urinalysis Basic - ( 15 Jose Alejandro 2025 01:28 )    Color: x / Appearance: x / SG: x / pH: x  Gluc: 88 mg/dL / Ketone: x  / Bili: x / Urobili: x   Blood: x / Protein: x / Nitrite: x   Leuk Esterase: x / RBC: x / WBC x   Sq Epi: x / Non Sq Epi: x / Bacteria: x                RADIOLOGY, EKG & ADDITIONAL TESTS: Reviewed.

## 2025-01-15 NOTE — ED ADULT NURSE NOTE - NSFALLUNIVINTERV_ED_ALL_ED
Bed/Stretcher in lowest position, wheels locked, appropriate side rails in place/Call bell, personal items and telephone in reach/Instruct patient to call for assistance before getting out of bed/chair/stretcher/Non-slip footwear applied when patient is off stretcher/[place] to call system/Physically safe environment - no spills, clutter or unnecessary equipment/Purposeful proactive rounding/Room/bathroom lighting operational, light cord in reach

## 2025-01-15 NOTE — ED ADULT NURSE NOTE - FINAL NURSING ELECTRONIC SIGNATURE
Patient ID: Sarah Roberson is a 78 y.o. female    Chief Compliant:  Chief Complaint   Patient presents with    Back Pain     lumbar        Diagnostic imaging:        Assessment and Plan:  1. Lumbosacral spondylosis without myelopathy    2. Chronic bilateral thoracic back pain    3. Post laminectomy syndrome      We will proceed with removal spinal cord stimulator    We discussed risks of surgery and recovery process. Risks include infection, bleeding, neurologic injury, systemic and anesthetic complications, no relief of pain, need for future surgery. Follow up 2 weeks after surgery    HPI:  This is a 78 y.o. female who presents to the clinic today for back pain. S/p spinal cord stimulator implant 7/2020 with no relief    Patient was scheduled to have her spinal cord stimulator removed however it was cancelled because her hemoglobin was low. Her HBG has improved to a normal level. Patient would like to proceed with spinal cord stimulator implant removal    Patient had pain pump implant in 2020 as well    Review of Systems   All other systems reviewed and are negative. Past History:    Physical Exam:  Vitals signs and nursing note reviewed. Constitutional:       Appearance: well-developed. HENT:      Head: Normocephalic and atraumatic. Nose: Nose normal.   Eyes:      Conjunctiva/sclera: Conjunctivae normal.   Neck:      Musculoskeletal: Normal range of motion and neck supple. Pulmonary:      Effort: Pulmonary effort is normal. No respiratory distress. Musculoskeletal:      Comments: Normal gait     Skin:     General: Skin is warm and dry. Neurological:      Mental Status: Alert and oriented to person, place, and time. Sensory: No sensory deficit. Psychiatric:         Behavior: Behavior normal.         Thought Content: Thought content normal.        Provider Attestation:  Grace Dill, personally performed the services described in this documentation.  All medical record entries made by the scribe were at my direction and in my presence. I have reviewed the chart and discharge instructions and agree that the records reflect my personal performance and is accurate and complete. Darcie Patton MD 4/21/22       Scribe Attestation:  By signing my name below, Noris Santos, attest that this documentation has been prepared under the direction and in the presence of Dr. Maurizio Bello. Electronically signed: Yue Hooker, 4/21/22     Please note that this chart was generated using voice recognition Dragon dictation software. Although every effort was made to ensure the accuracy of this automated transcription, some errors in transcription may have occurred. 15-Jose Alejandro-2025 03:44

## 2025-01-15 NOTE — H&P ADULT - NSHPREVIEWOFSYSTEMS_GEN_ALL_CORE
REVIEW OF SYSTEMS:    CONSTITUTIONAL: Patient denies weakness, fevers or chills  EYES/ENT: Patient denies visual changes;  denies vertigo or throat pain   NECK: Patient denies pain or stiffness  RESPIRATORY: Patient denies cough, wheezing, hemoptysis; denies shortness of breath  CARDIOVASCULAR: Patient denies chest pain or palpitations  GASTROINTESTINAL: Patient denies abdominal or epigastric pain, nausea, vomiting, or hematemesis, diarrhea or constipation, melena or hematochezia.  GENITOURINARY: Patient denies dysuria, frequency or hematuria  NEUROLOGICAL: Patient denies numbness or weakness  SKIN: Patient denies itching, burning, rashes, or lesions   All other review of systems is negative unless indicated above. REVIEW OF SYSTEMS:    CONSTITUTIONAL: Patient denies weakness, fevers or chills  EYES/ENT: Patient denies visual changes;  denies vertigo or throat pain   NECK: Patient denies pain or stiffness  RESPIRATORY: Patient denies cough, wheezing, hemoptysis; denies shortness of breath  CARDIOVASCULAR: Patient denies chest pain or palpitations  GASTROINTESTINAL: Patient denies abdominal or epigastric pain, nausea, vomiting, or hematemesis, diarrhea or constipation, melena or hematochezia.  GENITOURINARY: Patient denies dysuria, frequency or hematuria  NEUROLOGICAL: Patient denies numbness or weakness  SKIN: Patient denies itching, burning, rashes, erythema/abscess over R radial styloid   All other review of systems is negative unless indicated above.

## 2025-01-15 NOTE — ED PROVIDER NOTE - CLINICAL SUMMARY MEDICAL DECISION MAKING FREE TEXT BOX
49-year-old male with recurrent Bartonella infection and abscess consulted orthopedics, discussed case with hand surgery Dr. Henderson who states orthopedics should be monitoring this patient as it is their patient previously, we will plan for IV antibiotics and admission to medicine orthopedic consultation   CBC CMP CRP blood cultures Unasyn  azithromycin x-ray wrist Ortho consult

## 2025-01-15 NOTE — H&P ADULT - NSICDXFAMILYHX_GEN_ALL_CORE_FT
04-Mar-2022 20:00
FAMILY HISTORY:  Father  Still living? No  FH: heart failure, Age at diagnosis: Age Unknown  FH: myocardial infarction, Age at diagnosis: Age Unknown    Mother  Still living? No  Family history of hypertension in mother, Age at diagnosis: Age Unknown  FH: heart failure, Age at diagnosis: Age Unknown

## 2025-01-15 NOTE — H&P ADULT - PROBLEM SELECTOR PLAN 2
On losartan 50mg qd. Hypertensive on admission.     - c/w losartan 50mg qd Per NICOLE, CD4 1008 and ANTELMO  Home med: Triumeq    - c/w home med Per NICOLE, CD4 1008 and VLUD  Home med: Triumeq    Plan:  - c/w Triumeq

## 2025-01-15 NOTE — H&P ADULT - PROBLEM SELECTOR PLAN 1
Per NICOLE, CD4 1008 and ANTELMO  Home med: Triumeq    - c/w home med with cellulitis, R radial styloid   recently tx at [facility] for cellulitis 2/2 cat bite/scratch, d/c with po cefadroxil and azithromycin  completed abx, developed swelling over styloid   PE: consistent with abscess, firm but slightly fluctuant, erythema over abscess and circumferentially surrounding  No signs of sepsis or systemic sxs  S/p Unasyn/Azithromycin in ED  Suspect that not true antibiotic failure but lack of source control given rest of cellulitis and sepsis resolved with po abx, likely that infection walled off and now needs I&D for source control     - c/w unasyn 1.5g q6h  - ortho for i&d (consulted, but must f/u) Area of erythema/fluctuance in R radial styloid   Pt was recently tx at [facility] for cellulitis 2/2 cat bite/scratch, d/c with po cefadroxil and azithromycin which he completed, but then developed swelling over styloid 3-4 days ago and has continued to worsen.   PE: consistent with abscess, firm but slightly fluctuant, erythema over abscess and circumferentially surrounding  No signs of sepsis or systemic sxs  S/p Unasyn/Azithromycin in ED  Suspect that not true antibiotic failure but lack of source control given rest of cellulitis and sepsis resolved with po abx, likely that infection walled off and now needs I&D for source control     XR R wrist: Soft tissue swelling in area of R radial styloid. Negative for gas or osseous erosion.     Plan:  - c/w Unasyn 1.5g q6h  - Ortho planning for bedside I&D   - F/u Ortho for o/p abx recs Pt was recently tx at [facility] for cellulitis 2/2 cat bite/scratch, d/c with po cefadroxil and azithromycin which he completed, but then developed swelling/erythema/fluctuance over R radial styloid 3-4d ago and has continued to worsen. Suspect that not true antibiotic failure but lack of source control given rest of cellulitis and sepsis resolved with po abx, likely that infection walled off and now needs I&D for source control.   PE: consistent with abscess, firm but slightly fluctuant, erythema over abscess and circumferentially surrounding  No signs of sepsis or systemic sxs  S/p Unasyn/Azithromycin in ED, s/p bedside I&D by ortho this AM     XR R wrist: Soft tissue swelling in area of R radial styloid. Negative for gas or osseous erosion.     Plan:  - c/w Unasyn 1.5g q6h  - Pt is s/p bedside I&D, cultures pending Pt was recently tx at [facility] for cellulitis 2/2 cat bite/scratch, d/c with po cefadroxil and azithromycin which he completed, but then developed swelling/erythema/fluctuance over R radial styloid 3-4d ago and has continued to worsen. Suspect that not true antibiotic failure but lack of source control given rest of cellulitis and sepsis resolved with po abx, likely that infection walled off and now needs I&D for source control.   PE: consistent with abscess, firm but slightly fluctuant, erythema over abscess and circumferentially surrounding  No signs of sepsis or systemic sxs  S/p Unasyn/Azithromycin in ED, s/p bedside I&D by ortho this AM     XR R wrist: Soft tissue swelling in area of R radial styloid. Negative for gas or osseous erosion.     Plan:  - Pt is s/p bedside I&D, cultures pending  - C/w post drainage abx x 7-10d Pt was recently tx at [facility] for cellulitis 2/2 cat bite/scratch, d/c with po cefadroxil and azithromycin which he completed, but then developed swelling/erythema/fluctuance over R radial styloid 3-4d ago and has continued to worsen. Suspect that not true antibiotic failure but lack of source control given rest of cellulitis and sepsis resolved with po abx, likely that infection walled off and now needs I&D for source control.   PE: consistent with abscess, firm but slightly fluctuant, erythema over abscess and circumferentially surrounding  No signs of sepsis or systemic sxs  S/p Unasyn/Azithromycin in ED, s/p bedside I&D by ortho this AM     XR R wrist: Soft tissue swelling in area of R radial styloid. Negative for gas or osseous erosion.     Plan:  - Pt is s/p bedside I&D, cultures pending  - Start Augmentin 875 -125 mg x 7-10d  - F/u results of culture o/p

## 2025-01-15 NOTE — ED PROVIDER NOTE - PROGRESS NOTE DETAILS
PT stable not septic cultures sent ortho consulted xray negative, will defer to ortho for I&D admitted to medicine for iv abx

## 2025-01-15 NOTE — H&P ADULT - HISTORY OF PRESENT ILLNESS
HPI:    In the ED:  Initial vital signs: T: 98.5 F, HR: 117, BP: 185/114, R: 18, SpO2: 18% on RA  ED course:   Labs: significant for  Imaging:   CXR:   EKG:   Medications:   Consults: none    HPI: 49M with PMHx of HIV, depression/anxiety, hypertension, and recent admission for RUE cellulitis 2/2 cat scratch/bite who presents c/o ______     In the ED:  Initial vital signs: T: 98.5 F, HR: 117, BP: 185/114, R: 18, SpO2: 18% on RA  ED course:   Labs: unremarkable  XR Wrist:   Medications: Unasyn 1.5g, Azithromycin 500mg  Consults: Ortho-Hand    HPI: 49M with PMHx of HTN, HIV, depression/anxiety, and recent admission for RUE cellulitis 2/2 cat scratch/bite who presents c/o worsening erythema on R wrist. Pt was previously admitted to [facility] 12/29-12/30  finished po antibiotics for cellulitis (azithromycin and cefadroxil) approximately 3-4 days ago. 2 days ago, he noticed swelling over the radial styloid with overlying erythema. Pt sirisha margins around the erythema and it started to spread bilaterally, prompting him to come to the ED. Pt has no systemic sxs and otherwise feels well, states he has been at baseline, going to the gym since leaving prior.     In the ED:  Initial vital signs: T: 98.5 F, HR: 117, BP: 185/114, R: 18, SpO2: 18% on RA  ED course:   Labs: unremarkable  XR Wrist: pending read   Medications: Unasyn 1.5g, Azithromycin 500mg  Consults: Ortho-Hand    HPI: 49M with PMHx of HTN, HIV on Triumeq, depression/anxiety, and recent admission for RUE cellulitis 2/2 cat scratch/bite who presents c/o worsening erythema on R wrist x 3-4 d. Pt was previously admitted to [facility] 12/29-12/30  finished po antibiotics for cellulitis (azithromycin and cefadroxil) approximately 3-4 days ago. 2 days ago, he noticed swelling over the radial styloid with overlying erythema that he states felt "tighter" and more localized than before. Pt sirisha margins around the erythema and it started to spread bilaterally, prompting him to come to the ED. Pt has no systemic sxs and otherwise feels well, states he has been at baseline, going to the gym since leaving prior. Since admission, pt states the swelling and pain have gone down slightly. He denies fever, chills, HA, SOB, dizziness, N/V, or diarrhea.     In the ED:  Initial vital signs: T: 98.5 F, HR: 117, BP: 185/114, R: 18, SpO2: 18% on RA  ED course:   Labs: unremarkable  XR Wrist: pending read   Medications: Unasyn 1.5g, Azithromycin 500mg  Consults: Ortho-Hand

## 2025-01-15 NOTE — DISCHARGE NOTE PROVIDER - NSDCFUADDAPPT_GEN_ALL_CORE_FT
Please follow up with ortho in 7 days. Please follow up with PCP and orthopedics in 7 days. Please follow up with PCP and orthopedics in 5-7 days.

## 2025-01-15 NOTE — CONSULT NOTE ADULT - SUBJECTIVE AND OBJECTIVE BOX
Orthopaedic Surgery Consult Note  For Surgeon: Dr. Harper    HPI:  49yMale  Patient is a 49y old  Male who presents with a chief complaint of   HPI:      Allergies    No Known Allergies    Intolerances      PAST MEDICAL & SURGICAL HISTORY:  HIV disease      HTN (hypertension)      TBI (traumatic brain injury)      Cervical nerve root compression      Lumbar nerve root compression      History of depression      No significant past surgical history        MEDICATIONS  (STANDING):  azithromycin  IVPB 500 milliGRAM(s) IV Intermittent once    MEDICATIONS  (PRN):      Vital Signs Last 24 Hrs  T(C): 36.9 (15 Jose Alejandro 2025 00:22), Max: 36.9 (15 Jose Alejandro 2025 00:22)  T(F): 98.5 (15 Jose Alejandro 2025 00:22), Max: 98.5 (15 Jose Alejandro 2025 00:22)  HR: 117 (15 Jose Alejandro 2025 00:22) (117 - 117)  BP: 185/114 (15 Jose Alejandro 2025 00:22) (185/114 - 185/114)  BP(mean): --  RR: 18 (15 Jose Alejandro 2025 00:22) (18 - 18)  SpO2: 100% (15 Jose Alejandro 2025 00:22) (100% - 100%)    Parameters below as of 15 Jose Alejandro 2025 00:22  Patient On (Oxygen Delivery Method): room air        Physical Exam:                          13.0   8.05  )-----------( 268      ( 15 Jose Alejandro 2025 01:28 )             38.3             Imaging:     A/P: 49yMale    -Discussed with Dr. Mendiola Pager [phone]   Orthopaedic Surgery Consult Note  For Surgeon: Dr. Harper    HPI:  49yMale PMHx HIV (viral load undetectable)w R hand erythema & swelling s/p cat bite injury 12/26.   Pt suffered puncture wound on dorsal / radial aspect of wrist/forearm region an d admiited at that tie for IV abx, however eloped before finishing treatment and did not complete outpatient po treatment since then.    Pt presenting today with worsening symptoms.  Denies fevers / chills. Denies numbness, tingling, or weakness.       Allergies    No Known Allergies    Intolerances      PAST MEDICAL & SURGICAL HISTORY:  HIV disease  HTN (hypertension)  TBI (traumatic brain injury)  Cervical nerve root compression  Lumbar nerve root compression  History of depression  No significant past surgical history        MEDICATIONS  (STANDING):  azithromycin  IVPB 500 milliGRAM(s) IV Intermittent once    MEDICATIONS  (PRN):      Vital Signs Last 24 Hrs  T(C): 36.9 (15 Jose Alejandro 2025 00:22), Max: 36.9 (15 Jose Alejandro 2025 00:22)  T(F): 98.5 (15 Jose Alejandro 2025 00:22), Max: 98.5 (15 Jose Alejandro 2025 00:22)  HR: 117 (15 Jose Alejandro 2025 00:22) (117 - 117)  BP: 185/114 (15 Jose Alejandro 2025 00:22) (185/114 - 185/114)  BP(mean): --  RR: 18 (15 Jose Alejandro 2025 00:22) (18 - 18)  SpO2: 100% (15 Jose Alejandro 2025 00:22) (100% - 100%)    Parameters below as of 15 Jose Alejandro 2025 00:22  Patient On (Oxygen Delivery Method): room air        Physical Exam:                          13.0   8.05  )-----------( 268      ( 15 Jose Alejandro 2025 01:28 )             38.3             Imaging:     A/P: 49yMale w/ R hand / forearm cellulitis s/p cat bite injury     -CT hand/forearm asses for drainable abscesses  -Abx per primary  -Hand elevation  -Recommend marking out borders of cellulitis   -Monitor response to IV Abx  -Ortho will continue to follow       -Pending discussion with Dr. Harper    Ortho Pager [phone]    ****INCOMPLETE*****        Ortho Pager [phone]   Orthopaedic Surgery Consult Note  For Surgeon: Dr. Harper    HPI:  49yMale PMHx HIV (viral load undetectable)w R hand erythema & swelling s/p cat bite injury 12/26.   Pt suffered puncture wound on dorsal / radial aspect of wrist/forearm region and admitted at that time for IV abx for RUE cellulitis however eloped before finishing treatment.    Of note pt was on oral antibiotics at home which was sent to his  pharmacy prior to elopement however   Pt presenting today with worsening symptoms.    Denies fevers / chills. Denies numbness, tingling, or weakness.       Allergies    No Known Allergies    Intolerances      PAST MEDICAL & SURGICAL HISTORY:  HIV disease  HTN (hypertension)  TBI (traumatic brain injury)  Cervical nerve root compression  Lumbar nerve root compression  History of depression  No significant past surgical history        MEDICATIONS  (STANDING):  azithromycin  IVPB 500 milliGRAM(s) IV Intermittent once    MEDICATIONS  (PRN):      Vital Signs Last 24 Hrs  T(C): 36.9 (15 Jose Alejandro 2025 00:22), Max: 36.9 (15 Jose Alejandro 2025 00:22)  T(F): 98.5 (15 Jose Alejandro 2025 00:22), Max: 98.5 (15 Jose Alejandro 2025 00:22)  HR: 117 (15 Jose Alejandro 2025 00:22) (117 - 117)  BP: 185/114 (15 Jose Alejandro 2025 00:22) (185/114 - 185/114)  BP(mean): --  RR: 18 (15 Jose Alejandro 2025 00:22) (18 - 18)  SpO2: 100% (15 Jose Alejandro 2025 00:22) (100% - 100%)    Parameters below as of 15 Jose Alejandro 2025 00:22  Patient On (Oxygen Delivery Method): room air        Physical Exam:                          13.0   8.05  )-----------( 268      ( 15 Jose Alejandro 2025 01:28 )             38.3             Imaging:     A/P: 49yMale w/ R hand / forearm cellulitis s/p cat bite injury. Low suspicion for septic joint at this time    -CT hand/forearm asses for drainable abscesses  -Abx per primary  -Hand elevation  -Recommend marking out borders of cellulitis   -Monitor response to IV Abx  -Ortho will continue to follow       -Pending discussion with Dr. Harper    Ortho Pager [phone]    ****INCOMPLETE*****        Ortho Pager [phone]   Orthopaedic Surgery Consult Note  For Surgeon: Dr. Harper    HPI:  49yMale PMHx HIV (viral load undetectable)w R hand erythema & swelling s/p cat bite injury 12/26.   Pt suffered cat scratch / puncture wound on dorsal / radial aspect of wrist/forearm region and admitted at that time for IV abx for RUE cellulitis however eloped before finishing treatment.    Of note pt was on oral antibiotics at home which was sent to his  pharmacy prior to elopement  and finished course of po abx   Pt presenting today with symptoms of new R wrist pain, bump over the radial styloid with overlying erythema    Denies fevers / chills. Denies numbness, tingling, or weakness.       Allergies    No Known Allergies    Intolerances      PAST MEDICAL & SURGICAL HISTORY:  HIV disease  HTN (hypertension)  TBI (traumatic brain injury)  Cervical nerve root compression  Lumbar nerve root compression  History of depression  No significant past surgical history        MEDICATIONS  (STANDING):  azithromycin  IVPB 500 milliGRAM(s) IV Intermittent once    MEDICATIONS  (PRN):      Vital Signs Last 24 Hrs  T(C): 36.9 (15 Jose Alejandro 2025 00:22), Max: 36.9 (15 Jose Alejandro 2025 00:22)  T(F): 98.5 (15 Jose Alejandro 2025 00:22), Max: 98.5 (15 Jose Alejandro 2025 00:22)  HR: 117 (15 Jose Alejandro 2025 00:22) (117 - 117)  BP: 185/114 (15 Jose Alejandro 2025 00:22) (185/114 - 185/114)  BP(mean): --  RR: 18 (15 Jose Alejandro 2025 00:22) (18 - 18)  SpO2: 100% (15 Jose Alejandro 2025 00:22) (100% - 100%)    Parameters below as of 15 Jose Alejandro 2025 00:22  Patient On (Oxygen Delivery Method): room air        Physical Exam:  -Erythema & swelling surrounding puncture wound on dorsal / radial wrist  -induration over radial styloid / no purulence  -Painless passive ROM at wrist and all digits   -NVID                        13.0   8.05  )-----------( 268      ( 15 Jose Alejandro 2025 01:28 )             38.3             Imaging: R wrist no fracture or osseous abnormalities     A/P: 49yMale w/ R hand / forearm cellulitis s/p cat bite injury. Low suspicion for septic joint at this time  -Bed side I&D pending   -Abx per primary  -Monitor response to IV Abx  -Hand elevation  -Ortho will continue to follow       -Pending discussion with Dr. Harper    Ortho Pager [phone]    ****INCOMPLETE*****        Ortho Pager [phone]   Orthopaedic Surgery Consult Note  For Surgeon: Dr. Harper    HPI:  49yMale PMHx HIV (viral load undetectable)w R hand erythema & swelling s/p cat bite injury 12/26.   Pt suffered cat scratch / puncture wound on dorsal / radial aspect of wrist/forearm region and admitted at that time for IV abx for RUE cellulitis however eloped before finishing treatment.    Of note pt was on oral antibiotics at home which was sent to his  pharmacy prior to elopement  and finished course of po abx   Pt presenting today with symptoms of new R wrist pain, bump over the radial styloid with overlying erythema    Denies fevers / chills. Denies numbness, tingling, or weakness.       Allergies    No Known Allergies    Intolerances      PAST MEDICAL & SURGICAL HISTORY:  HIV disease  HTN (hypertension)  TBI (traumatic brain injury)  Cervical nerve root compression  Lumbar nerve root compression  History of depression  No significant past surgical history        MEDICATIONS  (STANDING):  azithromycin  IVPB 500 milliGRAM(s) IV Intermittent once    MEDICATIONS  (PRN):      Vital Signs Last 24 Hrs  T(C): 36.9 (15 Jose Alejandro 2025 00:22), Max: 36.9 (15 Jose Alejandro 2025 00:22)  T(F): 98.5 (15 Jose Alejandro 2025 00:22), Max: 98.5 (15 Jose Alejandro 2025 00:22)  HR: 117 (15 Jose Alejandro 2025 00:22) (117 - 117)  BP: 185/114 (15 Jose Alejandro 2025 00:22) (185/114 - 185/114)  BP(mean): --  RR: 18 (15 Jose Alejandro 2025 00:22) (18 - 18)  SpO2: 100% (15 Jose Alejandro 2025 00:22) (100% - 100%)    Parameters below as of 15 Jose Alejandro 2025 00:22  Patient On (Oxygen Delivery Method): room air        Physical Exam:  -Erythema & swelling surrounding puncture wound on dorsal / radial wrist  -induration over radial styloid / no purulence  -Painless passive ROM at wrist and all digits   -NVID                        13.0   8.05  )-----------( 268      ( 15 Jose Alejandro 2025 01:28 )             38.3             Imaging: R wrist no fracture or osseous abnormalities     Procedure: Using aseptic technique 5cc lidocaine injection to R wrist area. R wrist prepped in a sterile fashion and bedside I&D preformed. Minimal purulent drainage. Wound irrigated. Cultures sent.   Soft tissue dressing placed and RUE elevated in venkatesh block    A/P: 49yMale w/ R hand / forearm cellulitis s/p cat bite injury. Low suspicion for septic joint at this time  -S/p bedside I&D  -F/u wound cx  -Abx per primary  -Monitor response to IV Abx  -Daily dressing change by Ortho  -Hand elevation  -Ortho will continue to follow       -Pending discussion with Dr. Harper    Ortho Pager [phone]    ****INCOMPLETE*****        Ortho Pager [phone]

## 2025-01-15 NOTE — H&P ADULT - ASSESSMENT
49M with PMHx of HTN, HIV, depression/anxiety, and recent admission for RUE cellulitis 2/2 cat scratch/bite who presents c/o worsening erythema on R wrist, found to have abscess with cellulitis, admitted for I & D and IV antibiotics.  49M with PMHx of HTN, HIV on Triumeq, depression/anxiety, and recent admission for RUE cellulitis 2/2 cat scratch/bite who presents c/o worsening erythema on R wrist, found to have abscess with cellulitis, admitted for I & D and IV antibiotics. XR R wrist 1/15 revealed soft tissue swelling of radial styloid, ortho to follow for bedside drainage w/ PO Abx on D/C.  49M with PMHx of HTN, HIV on Triumeq, depression/anxiety, and recent admission for RUE cellulitis 2/2 cat scratch/bite who presents c/o worsening erythema on R wrist, found to have abscess with cellulitis, admitted for I & D and IV antibiotics. XR R wrist 1/15 revealed soft tissue swelling of radial styloid. Pt is now s/p bedside R wrist I&D by ortho will cultures pending.

## 2025-01-15 NOTE — PATIENT PROFILE ADULT - FALL HARM RISK - UNIVERSAL INTERVENTIONS
[facility] [address] [place] [address], [address] [place] [address] Street: [phone] ? [phone] ? Fax: [phone]      Discharge Summary    Patient - Sandi Santana            - 2009        MRN -  [id number]   Acct # - [de-identified]       Admit date: 2020    Discharge date: 2020    Attending Physician: Krista Ferreira MD     Primary Care Physician:  NELY Mahoney CNP    Principal Diagnosis:  Acute appendicitis    Other Diagnoses:  N/A    Complications: None     Surgical Operations and Procedures: Laparoscopic appendectomy    Consults: none    Pertinent Studies and Findings:   Ct Abdomen Pelvis W Iv Contrast Additional Contrast? None    Result Date: 2020  EXAMINATION: CT ABDOMEN PELVIS W IV CONTRAST HISTORY: Reason for exam:->Periumbilical and right lower quadrant abdominal pain. COMPARISON: 2019. TECHNIQUE: CT examination of the abdomen and pelvis following the administration of 75 mL Isovue-370 intravenous contrast. Coronal and sagittal reformations were performed. Dose reduction techniques were achieved by using automated exposure control and/or adjustment of mA and/or kV according to patient size and/or use of iterative reconstruction technique. FINDINGS: The visualized lung bases and the pleural spaces are clear. The liver, spleen, pancreas, adrenal glands and the kidneys are unremarkable. There is dilatation of the appendix measuring 1.5 cm in diameter which is fluid-filled with surrounding periappendiceal inflammation consistent with acute appendicitis. Small volume of free fluid is present within the pelvic cul-de-sac which may be physiologic. There are multiple mildly enlarged ileocolic ligament lymph nodes, likely reactive. Dilated fluid-filled appendix with surrounding periappendiceal inflammation consistent with acute appendicitis. No evidence of abscess or free intraperitoneal gas.  Critical results were called by Dr. Berhane Simmons to Dr. Carol Mendoza At 9/26/2020 3:44 PM EDT. Course of Patient: The patient is a 14year-old female who presented to the emergency department on 9/26/2020 with complaints of 3-day history of abdominal pain. Physical exam, lab values and diagnostic imaging supported the diagnosis of acute appendicitis. The patient was taken for laparoscopic appendectomy. The patient was started on a clear liquid diet postoperatively. The patient advanced to a general diet on postop day 1. Pain was well-controlled with p.o. medications. The patient was discharged home on postop day 1. Disposition: home    Readmission Planned: No    Recommendations on Discharge:  1. No rough play or other strenuous activity x 4 weeks  2. May shower in 48 hrs, no tub baths x 2 weeks  3. OTC tylenol and motrin for pain relief. Alternating schedule every 3 hours (eg. 6am: tylenol, 9am: motrin, 12pm: tylenol, 3pm: motrin, etc.)  4. Follow up in pediatric surgery clinic in 4-6 weeks. Call [phone] for an appointment or with any concerns in the meantime. Condition at Discharge:  stable    Electronically signed by Rae Collier on 9/28/2020    I have seen and examined patient. I have read the residents note above and agree with plan. Bed in lowest position, wheels locked, appropriate side rails in place/Call bell, personal items and telephone in reach/Instruct patient to call for assistance before getting out of bed or chair/Non-slip footwear when patient is out of bed/[place] to call system/Physically safe environment - no spills, clutter or unnecessary equipment/Purposeful Proactive Rounding/Room/bathroom lighting operational, light cord in reach

## 2025-01-16 ENCOUNTER — APPOINTMENT (OUTPATIENT)
Facility: CLINIC | Age: 50
End: 2025-01-16

## 2025-01-16 ENCOUNTER — EMERGENCY (EMERGENCY)
Facility: HOSPITAL | Age: 50
LOS: 1 days | Discharge: HOME | End: 2025-01-16
Attending: EMERGENCY MEDICINE | Admitting: EMERGENCY MEDICINE
Payer: MEDICAID

## 2025-01-16 VITALS
SYSTOLIC BLOOD PRESSURE: 151 MMHG | OXYGEN SATURATION: 97 % | WEIGHT: 164.91 LBS | TEMPERATURE: 98 F | HEART RATE: 94 BPM | HEIGHT: 69 IN | DIASTOLIC BLOOD PRESSURE: 85 MMHG | RESPIRATION RATE: 16 BRPM

## 2025-01-16 DIAGNOSIS — L02.413 CUTANEOUS ABSCESS OF RIGHT UPPER LIMB: ICD-10-CM

## 2025-01-16 PROCEDURE — 99212 OFFICE O/P EST SF 10 MIN: CPT

## 2025-01-16 PROCEDURE — 99283 EMERGENCY DEPT VISIT LOW MDM: CPT

## 2025-01-16 NOTE — ED ADULT NURSE NOTE - OBJECTIVE STATEMENT
Pt is a 50yo male presenting to ED c/o wound check. Pt states, "I was discharged yesterday after I had gauze placed inside my wound. I was told to take the gauze out after 24hrs and when I was removing it the gauze ripped and now I have gauze underneath my skin in my wound that I cannot access." Pt has open wound to right wrist, new dressing placed clean, dry, intact. Pt A&Ox4, breathing even and unlabored speaking in clear full sentences, ambulatory with steady gait, no acute distress, denies lightheadedness, dizziness, n/v/d, h/a, c/p, sob, f/c, vital signs stable.

## 2025-01-16 NOTE — ED ADULT TRIAGE NOTE - CHIEF COMPLAINT QUOTE
Pt presents to ED c/o wound packing stuck inside the wound.  Pt states, he was seen & discharged here yesterday for I & D. pt states he was instructed to removed the packing after 24 hrs & the gauze inside broke.

## 2025-01-17 NOTE — ED PROVIDER NOTE - CLINICAL SUMMARY MEDICAL DECISION MAKING FREE TEXT BOX
s/p recent I&D pt concerned there is gauze stuck in incision as only a small piece came out when he removed it today. +incision, no guaze within it, no evidence of worsening cellulitis, compartments soft.   recommend continuing abx, dressed with gauze. monitor for worsening infection    I have discussed the discharge plan with the patient. The patient agrees with the plan, as discussed.  The patient understands Emergency Department diagnosis is a preliminary diagnosis often based on limited information and that the patient must adhere to the follow-up plan as discussed.  The patient understands that if the symptoms worsen the patient may return to the Emergency Department at any time for further evaluation and treatment.

## 2025-01-17 NOTE — ED PROVIDER NOTE - NSFOLLOWUPINSTRUCTIONS_ED_ALL_ED_FT
Incision and Drainage, Care After  After incision and drainage, it is common to have:  Pain or discomfort around the incision site.  Blood, fluid, or pus (drainage) from the incision.  Redness and firm skin around the incision site.    Follow these instructions at home:  Medicines    Take over-the-counter and prescription medicines only as told by your health care provider.  If you were prescribed antibiotics, take them as told by your provider. Do not stop using the antibiotic even if you start to feel better.  Do not apply creams, ointments, or liquids unless you have been told to by your provider.    Wound care    Follow instructions from your provider about how to take care of your wound. Make sure you:  Wash your hands with soap and water for at least 20 seconds before and after you change your bandage (dressing). If soap and water are not available, use hand .  Change your dressing and any packing as told by your provider.  If the dressing is dry or stuck when you try to remove it, moisten or wet it with saline or water. This will help you remove it without harming your skin or tissues.    Check your wound every day for signs of infection. Check for:  More redness, swelling, or pain.  More fluid or blood.  Warmth.  Pus or a bad smell.  If you were sent home with a drain tube in place, follow instructions from your provider about:  How to empty it.  How to care for it at home.  Be careful when you get rid of used dressings, wound packing, or drainage.    Activity    Rest the affected area.  Return to your normal activities as told by your provider. Ask your provider what activities are safe for you.    General instructions    Do not use any products that contain nicotine or tobacco. These products include cigarettes, chewing tobacco, and vaping devices, such as e-cigarettes. These can delay incision healing after surgery. If you need help quitting, ask your provider.  Do not take baths, swim, or use a hot tub until your provider approves. Ask your provider if you may take showers. You may only be allowed to take sponge baths.  The incision will keep draining. It is normal to have some clear or slightly bloody drainage. The amount of drainage should go down each day.  Keep all follow-up visits. Your provider will need to make sure that your incision is healing well and that there are no problems.  Your health care provider may give you more instructions. Make sure you know what you can and cannot do    Contact a health care provider if:  Your cyst or abscess comes back.  You have any signs of infection.  You notice red streaks that spread away from the incision site.  You have a fever or chills.    Get help right away if:  You have severe pain or bleeding.  You become short of breath.  You have chest pain.  You have signs of a severe infection. You may notice changes in your incision area, such as:  Swelling that makes the skin feel hard.  Numbness or tingling.  Sudden increase in redness. Your skin color may change from red to purple, and then to dark spots.  Blisters, ulcers, or splitting of the skin.  These symptoms may be an emergency. Get help right away. Call 911.  Do not wait to see if the symptoms will go away.  Do not drive yourself to the hospital.  This information is not intended to replace advice given to you by your health care provider. Make sure you discuss any questions you have with your health care provider.

## 2025-01-17 NOTE — ED PROVIDER NOTE - PATIENT PORTAL LINK FT
You can access the FollowMyHealth Patient Portal offered by [facility] by registering at the following website: [email or web address]. By joining [facility]’s FollowMyHealth portal, you will also be able to view your health information using other applications (apps) compatible with our system.

## 2025-01-17 NOTE — ED PROVIDER NOTE - PHYSICAL EXAMINATION
right distal radial forearm - 1cm incision, mild swelling and induration, nontender, no fluctuance, no gauze within incision. 2+radial. no lymphagitis

## 2025-01-17 NOTE — ED PROVIDER NOTE - OBJECTIVE STATEMENT
49M hx htn, hiv, anxiety/depression, here for wound check. pt was recently admitted for RUE cellulitis d/t cat scratch/bite. pt had abscess to R wrist that was I&D'd by ortho. pt discharged on ABX. pt states he was told to remove packing today and he is concerned that the piece of gauze broke as only a small amount came out. no fevers. no worsening pain.  no hand swelling.

## 2025-01-22 DIAGNOSIS — L03.113 CELLULITIS OF RIGHT UPPER LIMB: ICD-10-CM

## 2025-01-22 DIAGNOSIS — F41.9 ANXIETY DISORDER, UNSPECIFIED: ICD-10-CM

## 2025-01-22 DIAGNOSIS — I10 ESSENTIAL (PRIMARY) HYPERTENSION: ICD-10-CM

## 2025-01-22 DIAGNOSIS — Z87.820 PERSONAL HISTORY OF TRAUMATIC BRAIN INJURY: ICD-10-CM

## 2025-01-22 DIAGNOSIS — B20 HUMAN IMMUNODEFICIENCY VIRUS [HIV] DISEASE: ICD-10-CM

## 2025-01-22 DIAGNOSIS — F32.A DEPRESSION, UNSPECIFIED: ICD-10-CM

## 2025-01-22 DIAGNOSIS — L02.413 CUTANEOUS ABSCESS OF RIGHT UPPER LIMB: ICD-10-CM
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
## 2025-01-22 DIAGNOSIS — S61.551A OPEN BITE OF RIGHT WRIST, INITIAL ENCOUNTER: ICD-10-CM

## 2025-01-27 ENCOUNTER — APPOINTMENT (OUTPATIENT)
Dept: INFECTIOUS DISEASE | Facility: CLINIC | Age: 50
End: 2025-01-27

## 2025-01-28 ENCOUNTER — APPOINTMENT (OUTPATIENT)
Dept: INFECTIOUS DISEASE | Facility: CLINIC | Age: 50
End: 2025-01-28

## 2025-02-18 ENCOUNTER — APPOINTMENT (OUTPATIENT)
Dept: INFECTIOUS DISEASE | Facility: CLINIC | Age: 50
End: 2025-02-18

## 2025-02-24 ENCOUNTER — APPOINTMENT (OUTPATIENT)
Dept: INFECTIOUS DISEASE | Facility: CLINIC | Age: 50
End: 2025-02-24

## 2025-02-25 ENCOUNTER — APPOINTMENT (OUTPATIENT)
Dept: ULTRASOUND IMAGING | Facility: CLINIC | Age: 50
End: 2025-02-25
Payer: MEDICAID

## 2025-02-25 ENCOUNTER — APPOINTMENT (OUTPATIENT)
Dept: INFECTIOUS DISEASE | Facility: CLINIC | Age: 50
End: 2025-02-25
Payer: MEDICAID

## 2025-02-25 ENCOUNTER — OUTPATIENT (OUTPATIENT)
Dept: OUTPATIENT SERVICES | Facility: HOSPITAL | Age: 50
LOS: 1 days | End: 2025-02-25

## 2025-02-25 ENCOUNTER — TRANSCRIPTION ENCOUNTER (OUTPATIENT)
Age: 50
End: 2025-02-25

## 2025-02-25 VITALS
OXYGEN SATURATION: 99 % | TEMPERATURE: 96.6 F | DIASTOLIC BLOOD PRESSURE: 95 MMHG | WEIGHT: 173 LBS | BODY MASS INDEX: 25.62 KG/M2 | SYSTOLIC BLOOD PRESSURE: 147 MMHG | HEIGHT: 69 IN | HEART RATE: 93 BPM

## 2025-02-25 DIAGNOSIS — L03.113 CELLULITIS OF RIGHT UPPER LIMB: ICD-10-CM

## 2025-02-25 DIAGNOSIS — Z21 ASYMPTOMATIC HUMAN IMMUNODEFICIENCY VIRUS [HIV] INFECTION STATUS: ICD-10-CM

## 2025-02-25 PROCEDURE — G2211 COMPLEX E/M VISIT ADD ON: CPT | Mod: NC

## 2025-02-25 PROCEDURE — 93971 EXTREMITY STUDY: CPT | Mod: 26,LT

## 2025-02-25 PROCEDURE — 99213 OFFICE O/P EST LOW 20 MIN: CPT

## 2025-02-26 ENCOUNTER — APPOINTMENT (OUTPATIENT)
Dept: ULTRASOUND IMAGING | Facility: CLINIC | Age: 50
End: 2025-02-26

## 2025-02-26 ENCOUNTER — RX RENEWAL (OUTPATIENT)
Age: 50
End: 2025-02-26

## 2025-02-26 ENCOUNTER — OUTPATIENT (OUTPATIENT)
Dept: OUTPATIENT SERVICES | Facility: HOSPITAL | Age: 50
LOS: 1 days | End: 2025-02-26

## 2025-02-26 LAB
ALBUMIN SERPL ELPH-MCNC: 3.5 G/DL
ALP BLD-CCNC: 96 U/L
ALT SERPL-CCNC: 18 U/L
ANION GAP SERPL CALC-SCNC: 13 MMOL/L
AST SERPL-CCNC: 17 U/L
BASOPHILS # BLD AUTO: 0.02 K/UL
BASOPHILS NFR BLD AUTO: 0.3 %
BILIRUB SERPL-MCNC: 0.2 MG/DL
BUN SERPL-MCNC: 11 MG/DL
CALCIUM SERPL-MCNC: 9.1 MG/DL
CHLORIDE SERPL-SCNC: 107 MMOL/L
CO2 SERPL-SCNC: 19 MMOL/L
CREAT SERPL-MCNC: 1 MG/DL
EGFR: 91 ML/MIN/1.73M2
EOSINOPHIL # BLD AUTO: 0.3 K/UL
EOSINOPHIL NFR BLD AUTO: 4.1 %
GLUCOSE SERPL-MCNC: 110 MG/DL
HCT VFR BLD CALC: 48.9 %
HGB BLD-MCNC: 14.5 G/DL
IMM GRANULOCYTES NFR BLD AUTO: 0.4 %
LYMPHOCYTES # BLD AUTO: 2.31 K/UL
LYMPHOCYTES NFR BLD AUTO: 32 %
MAN DIFF?: NORMAL
MCHC RBC-ENTMCNC: 27.9 PG
MCHC RBC-ENTMCNC: 29.7 G/DL
MCV RBC AUTO: 94 FL
MONOCYTES # BLD AUTO: 0.82 K/UL
MONOCYTES NFR BLD AUTO: 11.3 %
NEUTROPHILS # BLD AUTO: 3.75 K/UL
NEUTROPHILS NFR BLD AUTO: 51.9 %
PLATELET # BLD AUTO: 275 K/UL
POTASSIUM SERPL-SCNC: 4.4 MMOL/L
PROT SERPL-MCNC: 6 G/DL
RBC # BLD: 5.2 M/UL
RBC # FLD: 15.3 %
SODIUM SERPL-SCNC: 139 MMOL/L
WBC # FLD AUTO: 7.23 K/UL

## 2025-02-26 PROCEDURE — 76882 US LMTD JT/FCL EVL NVASC XTR: CPT | Mod: 26,RT

## 2025-02-27 ENCOUNTER — NON-APPOINTMENT (OUTPATIENT)
Age: 50
End: 2025-02-27

## 2025-02-27 LAB
CD3 CELLS # BLD: 1696 CELLS/UL
CD3 CELLS NFR BLD: 77 %
CD3+CD4+ CELLS # BLD: 971 CELLS/UL
CD3+CD4+ CELLS NFR BLD: 44 %
CD3+CD4+ CELLS/CD3+CD8+ CLL SPEC: 1.32 RATIO
CD3+CD8+ CELLS # SPEC: 734 CELLS/UL
CD3+CD8+ CELLS NFR BLD: 34 %
HIV1 RNA # SERPL NAA+PROBE: ABNORMAL
HIV1 RNA # SERPL NAA+PROBE: ABNORMAL COPIES/ML
VIRAL LOAD INTERP: NORMAL
VIRAL LOAD LOG: ABNORMAL LG COP/ML

## 2025-03-06 ENCOUNTER — APPOINTMENT (OUTPATIENT)
Dept: NEUROLOGY | Age: 50
End: 2025-03-06
Payer: MEDICAID

## 2025-03-06 ENCOUNTER — NON-APPOINTMENT (OUTPATIENT)
Age: 50
End: 2025-03-06

## 2025-03-06 VITALS
TEMPERATURE: 98 F | HEIGHT: 69 IN | RESPIRATION RATE: 17 BRPM | DIASTOLIC BLOOD PRESSURE: 77 MMHG | OXYGEN SATURATION: 100 % | HEART RATE: 96 BPM | SYSTOLIC BLOOD PRESSURE: 108 MMHG

## 2025-03-06 DIAGNOSIS — M79.2 NEURALGIA AND NEURITIS, UNSPECIFIED: ICD-10-CM

## 2025-03-06 DIAGNOSIS — S06.0X0A CONCUSSION W/OUT LOSS OF CONSCIOUSNESS, INITIAL ENCOUNTER: ICD-10-CM

## 2025-03-06 PROCEDURE — 99214 OFFICE O/P EST MOD 30 MIN: CPT

## 2025-03-08 ENCOUNTER — APPOINTMENT (OUTPATIENT)
Dept: AFTER HOURS CARE | Facility: EMERGENCY ROOM | Age: 50
End: 2025-03-08
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
## 2025-03-08 PROCEDURE — 99214 OFFICE O/P EST MOD 30 MIN: CPT | Mod: 95

## 2025-03-08 RX ORDER — AMOXICILLIN AND CLAVULANATE POTASSIUM 875; 125 MG/1; MG/1
875-125 TABLET, COATED ORAL
Qty: 14 | Refills: 0 | Status: ACTIVE | COMMUNITY
Start: 2025-03-08 | End: 1900-01-01

## 2025-03-12 ENCOUNTER — APPOINTMENT (OUTPATIENT)
Dept: UROLOGY | Facility: CLINIC | Age: 50
End: 2025-03-12
Payer: MEDICAID

## 2025-03-12 VITALS
HEART RATE: 100 BPM | SYSTOLIC BLOOD PRESSURE: 158 MMHG | DIASTOLIC BLOOD PRESSURE: 107 MMHG | TEMPERATURE: 97.3 F | OXYGEN SATURATION: 100 %

## 2025-03-12 DIAGNOSIS — Z78.9 OTHER SPECIFIED HEALTH STATUS: ICD-10-CM

## 2025-03-12 DIAGNOSIS — R03.0 ELEVATED BLOOD-PRESSURE READING, W/OUT DIAGNOSIS OF HYPERTENSION: ICD-10-CM

## 2025-03-12 PROCEDURE — 99213 OFFICE O/P EST LOW 20 MIN: CPT

## 2025-04-03 ENCOUNTER — NON-APPOINTMENT (OUTPATIENT)
Age: 50
End: 2025-04-03

## 2025-04-07 ENCOUNTER — APPOINTMENT (OUTPATIENT)
Dept: MRI IMAGING | Facility: CLINIC | Age: 50
End: 2025-04-07

## 2025-04-10 ENCOUNTER — APPOINTMENT (OUTPATIENT)
Dept: FAMILY MEDICINE | Facility: CLINIC | Age: 50
End: 2025-04-10

## 2025-04-18 ENCOUNTER — OUTPATIENT (OUTPATIENT)
Dept: OUTPATIENT SERVICES | Facility: HOSPITAL | Age: 50
LOS: 1 days | End: 2025-04-18
Payer: MEDICAID

## 2025-04-18 ENCOUNTER — APPOINTMENT (OUTPATIENT)
Dept: CT IMAGING | Facility: HOSPITAL | Age: 50
End: 2025-04-18

## 2025-04-18 PROCEDURE — 82565 ASSAY OF CREATININE: CPT

## 2025-04-18 PROCEDURE — 75574 CT ANGIO HRT W/3D IMAGE: CPT | Mod: 26

## 2025-04-18 PROCEDURE — 75574 CT ANGIO HRT W/3D IMAGE: CPT

## 2025-04-21 ENCOUNTER — RESULT REVIEW (OUTPATIENT)
Age: 50
End: 2025-04-21

## 2025-04-21 ENCOUNTER — APPOINTMENT (OUTPATIENT)
Dept: HEART AND VASCULAR | Facility: CLINIC | Age: 50
End: 2025-04-21
Payer: MEDICAID

## 2025-04-21 ENCOUNTER — OUTPATIENT (OUTPATIENT)
Dept: OUTPATIENT SERVICES | Facility: HOSPITAL | Age: 50
LOS: 1 days | End: 2025-04-21
Payer: MEDICAID

## 2025-04-21 VITALS
TEMPERATURE: 97.8 F | HEART RATE: 97 BPM | OXYGEN SATURATION: 100 % | SYSTOLIC BLOOD PRESSURE: 160 MMHG | DIASTOLIC BLOOD PRESSURE: 114 MMHG | HEIGHT: 69 IN

## 2025-04-21 VITALS — DIASTOLIC BLOOD PRESSURE: 116 MMHG | SYSTOLIC BLOOD PRESSURE: 155 MMHG

## 2025-04-21 DIAGNOSIS — E78.5 HYPERLIPIDEMIA, UNSPECIFIED: ICD-10-CM

## 2025-04-21 PROCEDURE — 75580 N-INVAS EST C FFR SW ALY CTA: CPT | Mod: 26

## 2025-04-21 PROCEDURE — 75580 N-INVAS EST C FFR SW ALY CTA: CPT

## 2025-04-21 PROCEDURE — 99214 OFFICE O/P EST MOD 30 MIN: CPT

## 2025-04-21 PROCEDURE — G2211 COMPLEX E/M VISIT ADD ON: CPT | Mod: NC

## 2025-04-29 ENCOUNTER — NON-APPOINTMENT (OUTPATIENT)
Age: 50
End: 2025-04-29

## 2025-04-29 ENCOUNTER — TRANSCRIPTION ENCOUNTER (OUTPATIENT)
Age: 50
End: 2025-04-29

## 2025-04-29 ENCOUNTER — APPOINTMENT (OUTPATIENT)
Dept: INTERNAL MEDICINE | Facility: CLINIC | Age: 50
End: 2025-04-29
Payer: MEDICAID

## 2025-04-29 VITALS
HEIGHT: 69 IN | WEIGHT: 179 LBS | OXYGEN SATURATION: 100 % | RESPIRATION RATE: 18 BRPM | BODY MASS INDEX: 26.51 KG/M2 | DIASTOLIC BLOOD PRESSURE: 84 MMHG | HEART RATE: 98 BPM | SYSTOLIC BLOOD PRESSURE: 123 MMHG | TEMPERATURE: 96.2 F

## 2025-04-29 DIAGNOSIS — Z21 ASYMPTOMATIC HUMAN IMMUNODEFICIENCY VIRUS [HIV] INFECTION STATUS: ICD-10-CM

## 2025-04-29 DIAGNOSIS — Z01.818 ENCOUNTER FOR OTHER PREPROCEDURAL EXAMINATION: ICD-10-CM

## 2025-04-29 DIAGNOSIS — I10 ESSENTIAL (PRIMARY) HYPERTENSION: ICD-10-CM

## 2025-04-29 DIAGNOSIS — Z79.82 LONG TERM (CURRENT) USE OF ASPIRIN: ICD-10-CM

## 2025-04-29 DIAGNOSIS — Z78.9 OTHER SPECIFIED HEALTH STATUS: ICD-10-CM

## 2025-04-29 PROCEDURE — 93000 ELECTROCARDIOGRAM COMPLETE: CPT

## 2025-04-29 PROCEDURE — 36415 COLL VENOUS BLD VENIPUNCTURE: CPT

## 2025-04-29 PROCEDURE — 99214 OFFICE O/P EST MOD 30 MIN: CPT | Mod: 25

## 2025-04-29 RX ORDER — ROSUVASTATIN CALCIUM 10 MG/1
10 TABLET, FILM COATED ORAL
Qty: 90 | Refills: 3 | Status: ACTIVE | COMMUNITY
Start: 2025-04-21 | End: 1900-01-01

## 2025-04-29 RX ORDER — ASPIRIN 81 MG/1
81 TABLET, DELAYED RELEASE ORAL DAILY
Qty: 90 | Refills: 3 | Status: ACTIVE | COMMUNITY
Start: 2025-04-22 | End: 1900-01-01

## 2025-04-30 ENCOUNTER — TRANSCRIPTION ENCOUNTER (OUTPATIENT)
Age: 50
End: 2025-04-30

## 2025-04-30 PROBLEM — Z79.82 CURRENT USE OF ASPIRIN: Status: ACTIVE | Noted: 2025-04-29

## 2025-04-30 PROBLEM — Z01.818 PRE-OP EVALUATION: Status: ACTIVE | Noted: 2025-04-29

## 2025-04-30 LAB
ALBUMIN SERPL ELPH-MCNC: 4.1 G/DL
ALP BLD-CCNC: 86 U/L
ALT SERPL-CCNC: 23 U/L
ANION GAP SERPL CALC-SCNC: 13 MMOL/L
APTT BLD: 31.7 SEC
AST SERPL-CCNC: 20 U/L
BASOPHILS # BLD AUTO: 0.02 K/UL
BASOPHILS NFR BLD AUTO: 0.2 %
BILIRUB SERPL-MCNC: 0.2 MG/DL
BUN SERPL-MCNC: 18 MG/DL
CALCIUM SERPL-MCNC: 9.1 MG/DL
CHLORIDE SERPL-SCNC: 103 MMOL/L
CO2 SERPL-SCNC: 26 MMOL/L
CREAT SERPL-MCNC: 1.19 MG/DL
EGFRCR SERPLBLD CKD-EPI 2021: 74 ML/MIN/1.73M2
EOSINOPHIL # BLD AUTO: 0.12 K/UL
EOSINOPHIL NFR BLD AUTO: 1.5 %
GLUCOSE SERPL-MCNC: 97 MG/DL
HCT VFR BLD CALC: 41.3 %
HGB BLD-MCNC: 13.6 G/DL
IMM GRANULOCYTES NFR BLD AUTO: 0.2 %
INR PPP: 0.83 RATIO
LYMPHOCYTES # BLD AUTO: 2.53 K/UL
LYMPHOCYTES NFR BLD AUTO: 31.2 %
MAN DIFF?: NORMAL
MCHC RBC-ENTMCNC: 28.3 PG
MCHC RBC-ENTMCNC: 32.9 G/DL
MCV RBC AUTO: 86 FL
MONOCYTES # BLD AUTO: 0.72 K/UL
MONOCYTES NFR BLD AUTO: 8.9 %
NEUTROPHILS # BLD AUTO: 4.71 K/UL
NEUTROPHILS NFR BLD AUTO: 58 %
PLATELET # BLD AUTO: 287 K/UL
POTASSIUM SERPL-SCNC: 4.2 MMOL/L
PROT SERPL-MCNC: 7 G/DL
PT BLD: 9.9 SEC
RBC # BLD: 4.8 M/UL
RBC # FLD: 13.9 %
SODIUM SERPL-SCNC: 141 MMOL/L
WBC # FLD AUTO: 8.12 K/UL

## 2025-05-06 NOTE — ASU PATIENT PROFILE, ADULT - FALL HARM RISK - TYPE OF ASSESSMENT
Infectious Disease Progress Note    S: patient seen and examined. Tmax 37.9. Non-verbal.        Review of Systems:  Pertinent items are noted in subjective part of the note above        O:Temp (24hrs), Av.3 °F (37.4 °C), Min:98.1 °F (36.7 °C), Max:100.2 °F (37.9 °C)  Systolic (24hrs), Av , Min:120 , Max:131   Diastolic (24hrs), Av, Min:71, Max:83  HR     Visit Vitals  /75 (BP Location: RUE - Right upper extremity, Patient Position: Semi-Mark's)   Pulse (!) 113   Temp 100 °F (37.8 °C) (Axillary)   Resp 17   Ht 5' 3\" (1.6 m)   Wt 61.5 kg (135 lb 9.3 oz)   SpO2 98%   BMI 24.02 kg/m²         GEN: NAD,   HEENT: NC/AT,   NECK: supple, no LAD  CV: S1, S2  PULM: diminished   ABD: soft, NT/ND  MSK: no c/c/e  NEURO: alert  SKIN: no rashes    Medications:    Current Facility-Administered Medications   Medication    sodium chloride (NORMAL SALINE) 0.9 % bolus 1,000 mL    Vanc trough due  @ 1530    fentaNYL (SUBLIMAZE) injection 25 mcg    HYDROmorphone (DILAUDID) injection 0.2 mg    fentaNYL (SUBLIMAZE) injection 50 mcg    HYDROmorphone (DILAUDID) injection 0.4 mg    HYDROcodone-acetaminophen (NORCO) 5-325 MG per tablet 1 tablet    ondansetron (ZOFRAN) injection 4 mg    droperidol (INAPSINE) injection 0.625 mg    labetalol (NORMODYNE) injection 5 mg    VANCOMYCIN - PHARMACIST MONITORED Misc    cefepime (MAXIPIME) 1,000 mg in sodium chloride 0.9 % 100 mL IVPB    vancomycin (VANCOCIN) 1,000 mg in sodium chloride 0.9 % 250 mL IVPB    acetaminophen (TYLENOL) tablet 650 mg    polyethylene glycol (MIRALAX) packet 17 g    docusate sodium-sennosides (SENOKOT S) 50-8.6 MG 2 tablet    aluminum-magnesium hydroxide-simethicone (MAALOX) 200-200-20 MG/5ML suspension 30 mL    dextrose (GLUTOSE) 40 % gel 15 g    dextrose (GLUTOSE) 40 % gel 30 g    potassium CHLORIDE (KLOR-CON) packet 20 mEq    gabapentin (NEURONTIN) 250 MG/5ML solution 100 mg    dextrose 50 % injection 25 g    dextrose 50 % injection 12.5 g    glucagon  (GLUCAGEN) injection 1 mg    sodium chloride 0.9 % flush bag 25 mL    sodium chloride 0.9 % injection 2 mL    Magnesium Standard Replacement Protocol    Phosphorus Standard Replacement Protocol    ondansetron (ZOFRAN) injection 4 mg    bisacodyl (DULCOLAX) suppository 10 mg    sodium chloride 0.9 % flush bag 25 mL    sodium chloride (NORMAL SALINE) 0.9 % bolus 500 mL    heparin (porcine) injection 5,000 Units    Potassium Standard Replacement Protocol (Levels 3.5 and lower)    amantadine (SYMMETREL) 50 MG/5ML solution 100 mg    atorvastatin (LIPITOR) tablet 40 mg    docusate sodium-sennosides (SENOKOT S) 50-8.6 MG 2 tablet    famotidine (PEPCID) tablet 10 mg    folic acid (FOLATE) tablet 1 mg    hydrALAZINE (APRESOLINE) tablet 10 mg    levETIRAcetam ORAL (KepPRA) 100 MG/ML oral solution 500 mg    melatonin tablet 3 mg    scopolamine (TRANSDERM_SCOP) 1 MG/3DAYS patch 1 patch    thiamine (VITAMIN B1) tablet 100 mg    ipratropium-albuterol (DUONEB) 0.5-2.5 (3) MG/3ML nebulizer solution 3 mL    ipratropium-albuterol (DUONEB) 0.5-2.5 (3) MG/3ML nebulizer solution 3 mL    aspirin chewable 81 mg    metoCLOPramide (REGLAN) tablet 5 mg    lipase-protease-amylase 24,000-76,000-120,000 units (CREON) per capsule 1 capsule       Infusions  Current Facility-Administered Medications   Medication Dose Route Frequency Provider Last Rate Last Admin            PRN  Current Facility-Administered Medications   Medication Dose Route Frequency Provider Last Rate Last Admin    fentaNYL (SUBLIMAZE) injection 25 mcg  25 mcg Intravenous Q5 Min PRN Odilon Raza MD        HYDROmorphone (DILAUDID) injection 0.2 mg  0.2 mg Intravenous Q5 Min PRN Odilon Raza MD        fentaNYL (SUBLIMAZE) injection 50 mcg  50 mcg Intravenous Q5 Min PRN Odilon Raza MD        HYDROmorphone (DILAUDID) injection 0.4 mg  0.4 mg Intravenous Q5 Min PRN Odilon Raza MD        HYDROcodone-acetaminophen (NORCO) 5-325 MG per tablet 1 tablet  1 tablet Oral Once PRN  Odilon Raza MD        ondansetron (ZOFRAN) injection 4 mg  4 mg Intravenous BID PRN Odilon Raza MD        droperidol (INAPSINE) injection 0.625 mg  0.625 mg Intravenous Once PRN Odilon Raza MD        labetalol (NORMODYNE) injection 5 mg  5 mg Intravenous Q10 Min PRN Odilon Raza MD        acetaminophen (TYLENOL) tablet 650 mg  650 mg Per G Tube Q4H PRN Greg Espino MD   650 mg at 04/18/24 2202    polyethylene glycol (MIRALAX) packet 17 g  17 g Per G Tube Daily PRN Greg Espino MD        docusate sodium-sennosides (SENOKOT S) 50-8.6 MG 2 tablet  2 tablet Per G Tube Daily PRN Greg Espino MD        aluminum-magnesium hydroxide-simethicone (MAALOX) 200-200-20 MG/5ML suspension 30 mL  30 mL Per G Tube Q4H PRN Greg Espino MD        dextrose (GLUTOSE) 40 % gel 15 g  15 g Per G Tube PRN Greg Espino MD        dextrose (GLUTOSE) 40 % gel 30 g  30 g Per G Tube PRN Greg Espino MD        dextrose 50 % injection 25 g  25 g Intravenous PRN Greg Espino MD        dextrose 50 % injection 12.5 g  12.5 g Intravenous PRN Greg Espino MD        glucagon (GLUCAGEN) injection 1 mg  1 mg Intramuscular PRN Greg Espino MD        sodium chloride 0.9 % flush bag 25 mL  25 mL Intravenous PRN Kenny Ackerman MD        ondansetron (ZOFRAN) injection 4 mg  4 mg Intravenous 4x Daily PRN Kenny Ackerman MD        bisacodyl (DULCOLAX) suppository 10 mg  10 mg Rectal Daily PRN Kenny Ackerman MD        sodium chloride 0.9 % flush bag 25 mL  25 mL Intravenous PRN Kenny Ackerman MD 25 mL/hr at 04/17/24 2155 25 mL at 04/17/24 2155    sodium chloride (NORMAL SALINE) 0.9 % bolus 500 mL  500 mL Intravenous PRN Kenny Ackerman MD        ipratropium-albuterol (DUONEB) 0.5-2.5 (3) MG/3ML nebulizer solution 3 mL  3 mL Nebulization Q6H Resp PRN Jada Chandra MD   3 mL at 04/10/24 1156      Intake/Output last 3 shifts:  I/O last 3 completed shifts:  In: 1734.8 [P.O.:360; I.V.:6; NG/GT:1140; IV Piggyback:228.8]  Out: 1100 [Urine:1100]  Intake/Output this  shift:  No intake/output data recorded.     Labs:  Recent Labs   Lab 04/19/24  0709   WBC 24.2*   RBC 3.30*   HGB 8.9*   HCT 28.3*         Recent Labs   Lab 04/19/24  0709 04/18/24  0643 04/17/24  2040   SODIUM 138   < > 142   POTASSIUM 4.6   < > 3.9   CHLORIDE 108   < > 110   CO2 24   < > 27   BUN 32*   < > 24*   CREATININE 1.17*   < > 0.95   CALCIUM 9.3   < > 9.1   ALBUMIN  --   --  2.2*   BILIRUBIN  --   --  0.3   ALKPT  --   --  98   GPT  --   --  38   AST  --   --  24   GLUCOSE 174*   < > 152*    < > = values in this interval not displayed.      Microbiology Results  (Last 10 results in the past 7 days)      Specimen   Gram Smear   Culture Result   Status       04/17/24  1249         No epithelial cells seen.  [P]            Rare Polymorphonuclear cells.  [P]            No organisms seen.  [P]           04/16/24  0955         No polymorphonuclear cells seen.            Rare Epithelial cells.            Few Gram negative bacilli.                   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    CONSENT:  The risks, benefits and alternatives to the procedure were  explained to the patient and informed written consent was obtained.    ANESTHESIA/SEDATION: General anesthesia provided by anesthesia team. Please  see anesthesia records for complete details.    PROCEDURE/FINDINGS:    Elements of sterile barrier technique were followed including cap, mask,  sterile gown, sterile sheet, hand hygiene, and 2% chlorhexidine for  cutaneous antisepsis.    The patient was placed on the fluoroscopic table in the prone position. A  localizing  fluoroscopic image  of the T9-10 disc space was obtained.    A 15-gauge needle was advanced under intermittent fluoroscopic guidance via  an extrapedicular approach into the left T9-10 disc space. An 18-gauge FNA  needle was then advanced through the 15-gauge needle and approximately 2  aspirates were obtained of the T9-10 disc/bone. Approximately 2 cc of  bloody fluid was aspirated and sent for culture.    Hemostasis was achieved with manual compression over the skin entrance  site. A sterile bandage was applied.    The patient tolerated the procedure well without immediate complication.    Fluoroscopy time: 2.2 minutes  Radiation dose: 7 mGy  Impression: Successful T9-10 disc/bone biopsy, as above.    Electronically Signed by: LATISHA BARNETT M.D.   Signed on: 4/17/2024 2:19 PM   Workstation ID: [id number]  XR CHEST AP OR PA  Narrative: Exam: XR CHEST AP OR PA.     Indication: Status post bronchoscopy.    Comparison: 4/11/2024.    Findings: Single view of the chest demonstrates stable cardiomediastinal  silhouette. Tracheostomy cannula overlies the proximal to mid intrathoracic  trachea. Right upper extremity PICC has been removed. Increased linear  opacity in the right lung base. Persistent left medial lower lobe  atelectasis. No significant pleural effusion. No definite pneumothorax. The  thoracic musculoskeletal structures and the upper abdomen are stable.  Impression: Impression:   Right upper extremity PICC has been removed. Slightly increased right  basilar atelectasis. No other change.    Electronically Signed by: STAN HERNÁNDEZ MD   Signed on: 4/17/2024 1:11 PM   Workstation ID: [id number]        MICROBIOLOGY    Microbiology Results  (Last 10 results in the past 7 days)      Specimen   Gram Smear   Culture Result   Status       04/17/24  1249         No epithelial cells seen.  [P]            Rare Polymorphonuclear cells.  [P]            No organisms seen.  [P]           04/16/24  0955         No polymorphonuclear cells  Admission seen.            Rare Epithelial cells.            Few Gram negative bacilli.                   [P] - Preliminary Result             4/9/24 Bcx ngtd  4/16/24 BAL cx ngtd  4/17 Blood cx ngtd  4/17 t-spine biopsy cx ngtd    PREVIOUS ABX:    S/p vancomycin and meropenem (4/9-4/10)  S/p remdesivir x 3 days eot 4/11    RADIOLOGY:    MRCP noted with possible stone   MRI T-spien suggestive of discitis/osteomyelitis at T9T-10 area  NM bone scan whole body + reactivity at T9-T10 area    ASSESSMENT    Brett Carrion is a 65 year old female  HTN, chronic A-fib, prior CVA with left-sided residual hemiparesis, chronic respiratory failure with tracheostomy dependence presenting for fall out of bed from nursing home. Patient afebrile, hemodynamically stable on 6L TC. Labs with cr 0.9, alk phos 139, WBC 18, hgb 9.7, plt 372, COVID detedted CT value 44.2. CT chest noted mass like consolidation RLL, non-specific sclerotic lesions throughout the spin, T9-T10 endplate irregularity, chronic pancreatis with ductal dilation. Patient on vanc/meropenem/remdesivir. ID consulted for pneumonia.      Abnormal CT chest             -has think secretions resembling saliva; no purulence noted             -CT chest personally reviewed             -has chronic leukocytosis since January   -procal negative x 2  COVID-19             -unclear symptoms onset and patient with risk factors             -high CT value likely represents older infection  S/p fall  Chronic respiratory failure  Hx of CVA with residual hemiparesis  Leukocytosis             -chronic going back to January; increase due to steroids trending down   Abnormal MRI of t-spine   -radiographic concern for infection on MRI and bone scan   -s/p IR biopsy for cultures on 4/17    RECOMMENDATIONS:         continue vanc/cefepime 4/17-present pending IR biopsy cultures  Given radiographic findings will opt to treat for 6 weeks and do vanc/cefepime if cultures are negative  Monitor  clinically  Supportive care  Follow up cultures   Pulmonology following  Neurosurgery following  Discussed with primary   ID will follow       Agusto Robles DO  4/19/2024  8:01 AM

## 2025-05-06 NOTE — ASU PATIENT PROFILE, ADULT - ABLE TO REACH PT
Arrival time and instruction left on voicemail; instructed to arrive at 2:15pm; No solid food/dairy/candy/gum after 07:00am tomorrow; water allowed before 1:00pm tomorrow; patient reminded to come with photo ID/insurance/credit card; dress in comfortable clothes; no jewelries/contact lens/valuable; no smoking/alcohol drinking/recreational drug use today; escort to have photo ID; address and callback number was given/no

## 2025-05-07 ENCOUNTER — APPOINTMENT (OUTPATIENT)
Dept: UROLOGY | Facility: AMBULATORY SURGERY CENTER | Age: 50
End: 2025-05-07

## 2025-05-07 ENCOUNTER — OUTPATIENT (OUTPATIENT)
Dept: OUTPATIENT SERVICES | Facility: HOSPITAL | Age: 50
LOS: 1 days | Discharge: HOME | End: 2025-05-07
Payer: MEDICAID

## 2025-05-07 ENCOUNTER — RESULT REVIEW (OUTPATIENT)
Age: 50
End: 2025-05-07

## 2025-05-07 ENCOUNTER — TRANSCRIPTION ENCOUNTER (OUTPATIENT)
Age: 50
End: 2025-05-07

## 2025-05-07 VITALS
WEIGHT: 171.74 LBS | DIASTOLIC BLOOD PRESSURE: 80 MMHG | SYSTOLIC BLOOD PRESSURE: 138 MMHG | TEMPERATURE: 97 F | HEART RATE: 89 BPM | OXYGEN SATURATION: 99 % | HEIGHT: 69 IN | RESPIRATION RATE: 16 BRPM

## 2025-05-07 VITALS
SYSTOLIC BLOOD PRESSURE: 120 MMHG | RESPIRATION RATE: 15 BRPM | OXYGEN SATURATION: 99 % | DIASTOLIC BLOOD PRESSURE: 76 MMHG | HEART RATE: 91 BPM

## 2025-05-07 PROCEDURE — 54161 CIRCUM 28 DAYS OR OLDER: CPT

## 2025-05-07 PROCEDURE — 88304 TISSUE EXAM BY PATHOLOGIST: CPT | Mod: 26

## 2025-05-07 RX ORDER — ROSUVASTATIN CALCIUM 20 MG/1
1 TABLET, FILM COATED ORAL
Refills: 0 | DISCHARGE

## 2025-05-07 RX ORDER — SODIUM CHLORIDE 9 G/1000ML
500 INJECTION, SOLUTION INTRAVENOUS
Refills: 0 | Status: DISCONTINUED | OUTPATIENT
Start: 2025-05-07 | End: 2025-05-07

## 2025-05-07 RX ORDER — METOCLOPRAMIDE HCL 10 MG
10 TABLET ORAL ONCE
Refills: 0 | Status: DISCONTINUED | OUTPATIENT
Start: 2025-05-07 | End: 2025-05-07

## 2025-05-07 RX ORDER — HYDROCHLOROTHIAZIDE 50 MG/1
1 TABLET ORAL
Refills: 0 | DISCHARGE

## 2025-05-07 RX ORDER — ACETAMINOPHEN 500 MG/5ML
1000 LIQUID (ML) ORAL ONCE
Refills: 0 | Status: COMPLETED | OUTPATIENT
Start: 2025-05-07 | End: 2025-05-07

## 2025-05-07 RX ORDER — ONDANSETRON HCL/PF 4 MG/2 ML
4 VIAL (ML) INJECTION ONCE
Refills: 0 | Status: DISCONTINUED | OUTPATIENT
Start: 2025-05-07 | End: 2025-05-07

## 2025-05-07 RX ORDER — FENTANYL CITRATE-0.9 % NACL/PF 100MCG/2ML
25 SYRINGE (ML) INTRAVENOUS
Refills: 0 | Status: DISCONTINUED | OUTPATIENT
Start: 2025-05-07 | End: 2025-05-07

## 2025-05-07 RX ORDER — IBUPROFEN 200 MG
400 TABLET ORAL ONCE
Refills: 0 | Status: COMPLETED | OUTPATIENT
Start: 2025-05-07 | End: 2025-05-07

## 2025-05-07 RX ADMIN — SODIUM CHLORIDE 100 MILLILITER(S): 9 INJECTION, SOLUTION INTRAVENOUS at 18:40

## 2025-05-07 RX ADMIN — Medication 25 MICROGRAM(S): at 19:30

## 2025-05-07 RX ADMIN — Medication 400 MILLIGRAM(S): at 20:20

## 2025-05-07 RX ADMIN — Medication 25 MICROGRAM(S): at 19:10

## 2025-05-07 RX ADMIN — Medication 1000 MILLIGRAM(S): at 15:55

## 2025-05-07 RX ADMIN — Medication 25 MICROGRAM(S): at 19:15

## 2025-05-07 RX ADMIN — Medication 25 MICROGRAM(S): at 19:16

## 2025-05-07 RX ADMIN — Medication 400 MILLIGRAM(S): at 20:44

## 2025-05-07 NOTE — BRIEF OPERATIVE NOTE - NSICDXBRIEFPREOP_GEN_ALL_CORE_FT
PRE-OP DIAGNOSIS:  Short frenulum of penis 07-May-2025 18:34:29  Dmitri Taylor  Redundant foreskin 07-May-2025 18:34:20  Dmitri Taylor

## 2025-05-07 NOTE — ASU DISCHARGE PLAN (ADULT/PEDIATRIC) - CARE PROVIDER_API CALL
Chase Palomino Juan Antonio  Urology  [address] [place] [address] [place] [address]  Phone: [phone]  Fax: [phone]  Follow Up Time:

## 2025-05-07 NOTE — ASU DISCHARGE PLAN (ADULT/PEDIATRIC) - CALL YOUR DOCTOR IF YOU HAVE ANY OF THE FOLLOWING:
Swelling that gets worse/Wound/Surgical Site with redness, or foul smelling discharge or pus/Unable to urinate

## 2025-05-07 NOTE — PRE-ANESTHESIA EVALUATION ADULT - NSANTHOSAYNRD_GEN_A_CORE
No. VONDA screening performed.  STOP BANG Legend: 0-2 = LOW Risk; 3-4 = INTERMEDIATE Risk; 5-8 = HIGH Risk

## 2025-05-07 NOTE — ASU DISCHARGE PLAN (ADULT/PEDIATRIC) - FINANCIAL ASSISTANCE
[facility] provides services at a reduced cost to those who are determined to be eligible through [facility]’s financial assistance program. Information regarding [facility]’s financial assistance program can be found by going to [email or web address] or by calling [phone].

## 2025-05-07 NOTE — BRIEF OPERATIVE NOTE - NSICDXBRIEFPOSTOP_GEN_ALL_CORE_FT
POST-OP DIAGNOSIS:  Redundant foreskin 07-May-2025 18:34:36  Dmitri Taylor  Short frenulum of penis 07-May-2025 18:34:33  Dmitri Taylor

## 2025-05-07 NOTE — BRIEF OPERATIVE NOTE - NSICDXBRIEFPROCEDURE_GEN_ALL_CORE_FT
PROCEDURES:  Circumcision, age 28 days or older 07-May-2025 18:33:48  Dmitri Taylor  Frenulotomy, penis 07-May-2025 18:34:04  Dmitri Taylor

## 2025-05-08 ENCOUNTER — NON-APPOINTMENT (OUTPATIENT)
Age: 50
End: 2025-05-08

## 2025-05-15 ENCOUNTER — EMERGENCY (EMERGENCY)
Facility: HOSPITAL | Age: 50
LOS: 1 days | End: 2025-05-15
Attending: EMERGENCY MEDICINE | Admitting: EMERGENCY MEDICINE
Payer: MEDICAID

## 2025-05-15 ENCOUNTER — APPOINTMENT (OUTPATIENT)
Dept: UROLOGY | Facility: CLINIC | Age: 50
End: 2025-05-15
Payer: MEDICAID

## 2025-05-15 VITALS
SYSTOLIC BLOOD PRESSURE: 128 MMHG | RESPIRATION RATE: 18 BRPM | HEART RATE: 90 BPM | DIASTOLIC BLOOD PRESSURE: 88 MMHG | OXYGEN SATURATION: 99 %

## 2025-05-15 VITALS
HEART RATE: 85 BPM | OXYGEN SATURATION: 100 % | DIASTOLIC BLOOD PRESSURE: 86 MMHG | TEMPERATURE: 97.9 F | SYSTOLIC BLOOD PRESSURE: 126 MMHG

## 2025-05-15 VITALS
RESPIRATION RATE: 18 BRPM | SYSTOLIC BLOOD PRESSURE: 138 MMHG | OXYGEN SATURATION: 99 % | TEMPERATURE: 98 F | HEART RATE: 92 BPM | DIASTOLIC BLOOD PRESSURE: 87 MMHG | HEIGHT: 69 IN

## 2025-05-15 DIAGNOSIS — Z98.890 OTHER SPECIFIED POSTPROCEDURAL STATES: ICD-10-CM

## 2025-05-15 DIAGNOSIS — Z98.890 OTHER SPECIFIED POSTPROCEDURAL STATES: Chronic | ICD-10-CM

## 2025-05-15 DIAGNOSIS — N48.89 OTHER SPECIFIED DISORDERS OF PENIS: ICD-10-CM

## 2025-05-15 LAB
ANION GAP SERPL CALC-SCNC: 11 MMOL/L — SIGNIFICANT CHANGE UP (ref 5–17)
APPEARANCE UR: CLEAR — SIGNIFICANT CHANGE UP
BASOPHILS # BLD AUTO: 0.01 K/UL — SIGNIFICANT CHANGE UP (ref 0–0.2)
BASOPHILS NFR BLD AUTO: 0.1 % — SIGNIFICANT CHANGE UP (ref 0–2)
BILIRUB UR-MCNC: NEGATIVE — SIGNIFICANT CHANGE UP
BUN SERPL-MCNC: 28 MG/DL — HIGH (ref 7–23)
CALCIUM SERPL-MCNC: 9.2 MG/DL — SIGNIFICANT CHANGE UP (ref 8.4–10.5)
CHLORIDE SERPL-SCNC: 101 MMOL/L — SIGNIFICANT CHANGE UP (ref 96–108)
CO2 SERPL-SCNC: 25 MMOL/L — SIGNIFICANT CHANGE UP (ref 22–31)
COLOR SPEC: YELLOW — SIGNIFICANT CHANGE UP
CREAT SERPL-MCNC: 1.09 MG/DL — SIGNIFICANT CHANGE UP (ref 0.5–1.3)
DIFF PNL FLD: NEGATIVE — SIGNIFICANT CHANGE UP
EGFR: 82 ML/MIN/1.73M2 — SIGNIFICANT CHANGE UP
EGFR: 82 ML/MIN/1.73M2 — SIGNIFICANT CHANGE UP
EOSINOPHIL # BLD AUTO: 0.17 K/UL — SIGNIFICANT CHANGE UP (ref 0–0.5)
EOSINOPHIL NFR BLD AUTO: 1.9 % — SIGNIFICANT CHANGE UP (ref 0–6)
GLUCOSE SERPL-MCNC: 104 MG/DL — HIGH (ref 70–99)
GLUCOSE UR QL: NEGATIVE MG/DL — SIGNIFICANT CHANGE UP
HCT VFR BLD CALC: 40.1 % — SIGNIFICANT CHANGE UP (ref 39–50)
HGB BLD-MCNC: 13.7 G/DL — SIGNIFICANT CHANGE UP (ref 13–17)
IMM GRANULOCYTES NFR BLD AUTO: 0.3 % — SIGNIFICANT CHANGE UP (ref 0–0.9)
KETONES UR QL: NEGATIVE MG/DL — SIGNIFICANT CHANGE UP
LACTATE SERPL-SCNC: 0.8 MMOL/L — SIGNIFICANT CHANGE UP (ref 0.5–2)
LEUKOCYTE ESTERASE UR-ACNC: NEGATIVE — SIGNIFICANT CHANGE UP
LYMPHOCYTES # BLD AUTO: 3.41 K/UL — HIGH (ref 1–3.3)
LYMPHOCYTES # BLD AUTO: 37.9 % — SIGNIFICANT CHANGE UP (ref 13–44)
MCHC RBC-ENTMCNC: 28.9 PG — SIGNIFICANT CHANGE UP (ref 27–34)
MCHC RBC-ENTMCNC: 34.2 G/DL — SIGNIFICANT CHANGE UP (ref 32–36)
MCV RBC AUTO: 84.6 FL — SIGNIFICANT CHANGE UP (ref 80–100)
MONOCYTES # BLD AUTO: 0.86 K/UL — SIGNIFICANT CHANGE UP (ref 0–0.9)
MONOCYTES NFR BLD AUTO: 9.6 % — SIGNIFICANT CHANGE UP (ref 2–14)
NEUTROPHILS # BLD AUTO: 4.51 K/UL — SIGNIFICANT CHANGE UP (ref 1.8–7.4)
NEUTROPHILS NFR BLD AUTO: 50.2 % — SIGNIFICANT CHANGE UP (ref 43–77)
NITRITE UR-MCNC: NEGATIVE — SIGNIFICANT CHANGE UP
NRBC BLD AUTO-RTO: 0 /100 WBCS — SIGNIFICANT CHANGE UP (ref 0–0)
PH UR: 6 — SIGNIFICANT CHANGE UP (ref 5–8)
PLATELET # BLD AUTO: 260 K/UL — SIGNIFICANT CHANGE UP (ref 150–400)
POTASSIUM SERPL-MCNC: 3.6 MMOL/L — SIGNIFICANT CHANGE UP (ref 3.5–5.3)
POTASSIUM SERPL-SCNC: 3.6 MMOL/L — SIGNIFICANT CHANGE UP (ref 3.5–5.3)
PROT UR-MCNC: NEGATIVE MG/DL — SIGNIFICANT CHANGE UP
RBC # BLD: 4.74 M/UL — SIGNIFICANT CHANGE UP (ref 4.2–5.8)
RBC # FLD: 13.3 % — SIGNIFICANT CHANGE UP (ref 10.3–14.5)
SODIUM SERPL-SCNC: 137 MMOL/L — SIGNIFICANT CHANGE UP (ref 135–145)
SP GR SPEC: 1.02 — SIGNIFICANT CHANGE UP (ref 1–1.03)
UROBILINOGEN FLD QL: 0.2 MG/DL — SIGNIFICANT CHANGE UP (ref 0.2–1)
WBC # BLD: 8.99 K/UL — SIGNIFICANT CHANGE UP (ref 3.8–10.5)
WBC # FLD AUTO: 8.99 K/UL — SIGNIFICANT CHANGE UP (ref 3.8–10.5)

## 2025-05-15 PROCEDURE — 96374 THER/PROPH/DIAG INJ IV PUSH: CPT

## 2025-05-15 PROCEDURE — 85025 COMPLETE CBC W/AUTO DIFF WBC: CPT

## 2025-05-15 PROCEDURE — 99284 EMERGENCY DEPT VISIT MOD MDM: CPT | Mod: 25

## 2025-05-15 PROCEDURE — 99285 EMERGENCY DEPT VISIT HI MDM: CPT

## 2025-05-15 PROCEDURE — 83605 ASSAY OF LACTIC ACID: CPT

## 2025-05-15 PROCEDURE — 36415 COLL VENOUS BLD VENIPUNCTURE: CPT

## 2025-05-15 PROCEDURE — 96375 TX/PRO/DX INJ NEW DRUG ADDON: CPT

## 2025-05-15 PROCEDURE — 99213 OFFICE O/P EST LOW 20 MIN: CPT | Mod: 24

## 2025-05-15 PROCEDURE — 80048 BASIC METABOLIC PNL TOTAL CA: CPT

## 2025-05-15 PROCEDURE — 99283 EMERGENCY DEPT VISIT LOW MDM: CPT

## 2025-05-15 PROCEDURE — 81003 URINALYSIS AUTO W/O SCOPE: CPT

## 2025-05-15 RX ORDER — KETOROLAC TROMETHAMINE 30 MG/ML
15 INJECTION, SOLUTION INTRAMUSCULAR; INTRAVENOUS ONCE
Refills: 0 | Status: DISCONTINUED | OUTPATIENT
Start: 2025-05-15 | End: 2025-05-15

## 2025-05-15 RX ORDER — SULFAMETHOXAZOLE/TRIMETHOPRIM 800-160 MG
1 TABLET ORAL
Qty: 14 | Refills: 0
Start: 2025-05-15 | End: 2025-05-21

## 2025-05-15 RX ORDER — SULFAMETHOXAZOLE/TRIMETHOPRIM 800-160 MG
1 TABLET ORAL ONCE
Refills: 0 | Status: COMPLETED | OUTPATIENT
Start: 2025-05-15 | End: 2025-05-15

## 2025-05-15 RX ORDER — CEFAZOLIN SODIUM IN 0.9 % NACL 3 G/100 ML
1000 INTRAVENOUS SOLUTION, PIGGYBACK (ML) INTRAVENOUS ONCE
Refills: 0 | Status: COMPLETED | OUTPATIENT
Start: 2025-05-15 | End: 2025-05-15

## 2025-05-15 RX ADMIN — Medication 1 TABLET(S): at 04:47

## 2025-05-15 RX ADMIN — Medication 100 MILLIGRAM(S): at 04:47

## 2025-05-15 RX ADMIN — KETOROLAC TROMETHAMINE 15 MILLIGRAM(S): 30 INJECTION, SOLUTION INTRAMUSCULAR; INTRAVENOUS at 04:11

## 2025-05-17 DIAGNOSIS — N48.22 CELLULITIS OF CORPUS CAVERNOSUM AND PENIS: ICD-10-CM

## 2025-05-17 DIAGNOSIS — Z87.2 PERSONAL HISTORY OF DISEASES OF THE SKIN AND SUBCUTANEOUS TISSUE: ICD-10-CM

## 2025-05-17 DIAGNOSIS — N48.89 OTHER SPECIFIED DISORDERS OF PENIS: ICD-10-CM

## 2025-05-17 DIAGNOSIS — Z21 ASYMPTOMATIC HUMAN IMMUNODEFICIENCY VIRUS [HIV] INFECTION STATUS: ICD-10-CM

## 2025-05-17 DIAGNOSIS — I10 ESSENTIAL (PRIMARY) HYPERTENSION: ICD-10-CM

## 2025-05-17 DIAGNOSIS — Z98.890 OTHER SPECIFIED POSTPROCEDURAL STATES: ICD-10-CM

## 2025-05-19 ENCOUNTER — TRANSCRIPTION ENCOUNTER (OUTPATIENT)
Age: 50
End: 2025-05-19

## 2025-05-22 ENCOUNTER — NON-APPOINTMENT (OUTPATIENT)
Age: 50
End: 2025-05-22

## 2025-05-22 ENCOUNTER — APPOINTMENT (OUTPATIENT)
Dept: UROLOGY | Facility: CLINIC | Age: 50
End: 2025-05-22

## 2025-05-22 VITALS
HEIGHT: 69 IN | HEART RATE: 94 BPM | DIASTOLIC BLOOD PRESSURE: 83 MMHG | TEMPERATURE: 96.4 F | WEIGHT: 179 LBS | SYSTOLIC BLOOD PRESSURE: 134 MMHG | BODY MASS INDEX: 26.51 KG/M2

## 2025-05-23 ENCOUNTER — RX RENEWAL (OUTPATIENT)
Age: 50
End: 2025-05-23

## 2025-05-28 ENCOUNTER — NON-APPOINTMENT (OUTPATIENT)
Age: 50
End: 2025-05-28

## 2025-05-28 ENCOUNTER — APPOINTMENT (OUTPATIENT)
Dept: NEUROLOGY | Age: 50
End: 2025-05-28
Payer: MEDICAID

## 2025-05-28 VITALS
WEIGHT: 170 LBS | HEART RATE: 94 BPM | BODY MASS INDEX: 25.18 KG/M2 | OXYGEN SATURATION: 98 % | RESPIRATION RATE: 17 BRPM | TEMPERATURE: 97.8 F | HEIGHT: 69 IN | SYSTOLIC BLOOD PRESSURE: 131 MMHG | DIASTOLIC BLOOD PRESSURE: 84 MMHG

## 2025-05-28 DIAGNOSIS — M79.2 NEURALGIA AND NEURITIS, UNSPECIFIED: ICD-10-CM

## 2025-05-28 DIAGNOSIS — S06.0X0A CONCUSSION W/OUT LOSS OF CONSCIOUSNESS, INITIAL ENCOUNTER: ICD-10-CM

## 2025-05-28 PROCEDURE — 99215 OFFICE O/P EST HI 40 MIN: CPT

## 2025-06-02 ENCOUNTER — APPOINTMENT (OUTPATIENT)
Dept: HEART AND VASCULAR | Facility: CLINIC | Age: 50
End: 2025-06-02

## 2025-06-02 NOTE — PATIENT PROFILE ADULT - NSPROMEDSBROUGHTTOHOSP_GEN_A_NUR
Nasal mucosa clear.  Mouth with normal mucosa  Throat has no vesicles, no oropharyngeal exudates and uvula is midline.
no

## 2025-06-05 ENCOUNTER — APPOINTMENT (OUTPATIENT)
Dept: UROLOGY | Facility: CLINIC | Age: 50
End: 2025-06-05
Payer: MEDICAID

## 2025-06-05 VITALS
OXYGEN SATURATION: 99 % | DIASTOLIC BLOOD PRESSURE: 121 MMHG | TEMPERATURE: 97.3 F | HEART RATE: 108 BPM | SYSTOLIC BLOOD PRESSURE: 169 MMHG

## 2025-06-05 VITALS — SYSTOLIC BLOOD PRESSURE: 152 MMHG | DIASTOLIC BLOOD PRESSURE: 100 MMHG

## 2025-06-05 DIAGNOSIS — Z78.9 OTHER SPECIFIED HEALTH STATUS: ICD-10-CM

## 2025-06-05 PROCEDURE — 99212 OFFICE O/P EST SF 10 MIN: CPT

## 2025-08-10 ENCOUNTER — EMERGENCY (EMERGENCY)
Facility: HOSPITAL | Age: 50
LOS: 1 days | End: 2025-08-10
Attending: EMERGENCY MEDICINE | Admitting: EMERGENCY MEDICINE
Payer: MEDICAID

## 2025-08-10 VITALS
RESPIRATION RATE: 16 BRPM | TEMPERATURE: 99 F | SYSTOLIC BLOOD PRESSURE: 121 MMHG | DIASTOLIC BLOOD PRESSURE: 80 MMHG | HEART RATE: 113 BPM | OXYGEN SATURATION: 99 % | WEIGHT: 164.91 LBS

## 2025-08-10 DIAGNOSIS — R22.1 LOCALIZED SWELLING, MASS AND LUMP, NECK: ICD-10-CM

## 2025-08-10 DIAGNOSIS — Z21 ASYMPTOMATIC HUMAN IMMUNODEFICIENCY VIRUS [HIV] INFECTION STATUS: ICD-10-CM

## 2025-08-10 DIAGNOSIS — Z98.890 OTHER SPECIFIED POSTPROCEDURAL STATES: Chronic | ICD-10-CM

## 2025-08-10 DIAGNOSIS — R00.0 TACHYCARDIA, UNSPECIFIED: ICD-10-CM

## 2025-08-10 LAB
ALBUMIN SERPL ELPH-MCNC: 2.9 G/DL — LOW (ref 3.4–5)
ALP SERPL-CCNC: 85 U/L — SIGNIFICANT CHANGE UP (ref 40–120)
ALT FLD-CCNC: 26 U/L — SIGNIFICANT CHANGE UP (ref 12–42)
ANION GAP SERPL CALC-SCNC: 9 MMOL/L — SIGNIFICANT CHANGE UP (ref 9–16)
AST SERPL-CCNC: 25 U/L — SIGNIFICANT CHANGE UP (ref 15–37)
BASOPHILS # BLD AUTO: 0.01 K/UL — SIGNIFICANT CHANGE UP (ref 0–0.2)
BASOPHILS NFR BLD AUTO: 0.1 % — SIGNIFICANT CHANGE UP (ref 0–2)
BILIRUB SERPL-MCNC: 0.4 MG/DL — SIGNIFICANT CHANGE UP (ref 0.2–1.2)
BUN SERPL-MCNC: 14 MG/DL — SIGNIFICANT CHANGE UP (ref 7–23)
CALCIUM SERPL-MCNC: 8.7 MG/DL — SIGNIFICANT CHANGE UP (ref 8.5–10.5)
CHLORIDE SERPL-SCNC: 104 MMOL/L — SIGNIFICANT CHANGE UP (ref 96–108)
CO2 SERPL-SCNC: 29 MMOL/L — SIGNIFICANT CHANGE UP (ref 22–31)
CREAT SERPL-MCNC: 1.07 MG/DL — SIGNIFICANT CHANGE UP (ref 0.5–1.3)
CRP SERPL-MCNC: 2.9 MG/L — SIGNIFICANT CHANGE UP (ref 0.5–3)
EGFR: 84 ML/MIN/1.73M2 — SIGNIFICANT CHANGE UP
EGFR: 84 ML/MIN/1.73M2 — SIGNIFICANT CHANGE UP
EOSINOPHIL # BLD AUTO: 0.2 K/UL — SIGNIFICANT CHANGE UP (ref 0–0.5)
EOSINOPHIL NFR BLD AUTO: 1.7 % — SIGNIFICANT CHANGE UP (ref 0–6)
GLUCOSE SERPL-MCNC: 103 MG/DL — HIGH (ref 70–99)
HCT VFR BLD CALC: 40.9 % — SIGNIFICANT CHANGE UP (ref 39–50)
HETEROPH AB TITR SER AGGL: NEGATIVE — SIGNIFICANT CHANGE UP
HGB BLD-MCNC: 13.6 G/DL — SIGNIFICANT CHANGE UP (ref 13–17)
IMM GRANULOCYTES # BLD AUTO: 0.04 K/UL — SIGNIFICANT CHANGE UP (ref 0–0.07)
IMM GRANULOCYTES NFR BLD AUTO: 0.3 % — SIGNIFICANT CHANGE UP (ref 0–0.9)
LACTATE BLDV-MCNC: 1.9 MMOL/L — SIGNIFICANT CHANGE UP (ref 0.5–2)
LYMPHOCYTES # BLD AUTO: 2.45 K/UL — SIGNIFICANT CHANGE UP (ref 1–3.3)
LYMPHOCYTES NFR BLD AUTO: 21.4 % — SIGNIFICANT CHANGE UP (ref 13–44)
MAGNESIUM SERPL-MCNC: 2 MG/DL — SIGNIFICANT CHANGE UP (ref 1.6–2.6)
MCHC RBC-ENTMCNC: 28.1 PG — SIGNIFICANT CHANGE UP (ref 27–34)
MCHC RBC-ENTMCNC: 33.3 G/DL — SIGNIFICANT CHANGE UP (ref 32–36)
MCV RBC AUTO: 84.5 FL — SIGNIFICANT CHANGE UP (ref 80–100)
MONOCYTES # BLD AUTO: 0.97 K/UL — HIGH (ref 0–0.9)
MONOCYTES NFR BLD AUTO: 8.5 % — SIGNIFICANT CHANGE UP (ref 2–14)
NEUTROPHILS # BLD AUTO: 7.8 K/UL — HIGH (ref 1.8–7.4)
NEUTROPHILS NFR BLD AUTO: 68 % — SIGNIFICANT CHANGE UP (ref 43–77)
NRBC # BLD AUTO: 0 K/UL — SIGNIFICANT CHANGE UP (ref 0–0)
NRBC # FLD: 0 K/UL — SIGNIFICANT CHANGE UP (ref 0–0)
NRBC BLD AUTO-RTO: 0 /100 WBCS — SIGNIFICANT CHANGE UP (ref 0–0)
PLATELET # BLD AUTO: 258 K/UL — SIGNIFICANT CHANGE UP (ref 150–400)
PMV BLD: 9 FL — SIGNIFICANT CHANGE UP (ref 7–13)
POTASSIUM SERPL-MCNC: 3.6 MMOL/L — SIGNIFICANT CHANGE UP (ref 3.5–5.3)
POTASSIUM SERPL-SCNC: 3.6 MMOL/L — SIGNIFICANT CHANGE UP (ref 3.5–5.3)
PROT SERPL-MCNC: 6.8 G/DL — SIGNIFICANT CHANGE UP (ref 6.4–8.2)
RBC # BLD: 4.84 M/UL — SIGNIFICANT CHANGE UP (ref 4.2–5.8)
RBC # FLD: 14 % — SIGNIFICANT CHANGE UP (ref 10.3–14.5)
SODIUM SERPL-SCNC: 142 MMOL/L — SIGNIFICANT CHANGE UP (ref 132–145)
SOURCE RESPIRATORY: SIGNIFICANT CHANGE UP
WBC # BLD: 11.47 K/UL — HIGH (ref 3.8–10.5)
WBC # FLD AUTO: 11.47 K/UL — HIGH (ref 3.8–10.5)

## 2025-08-10 PROCEDURE — 99285 EMERGENCY DEPT VISIT HI MDM: CPT

## 2025-08-10 RX ADMIN — Medication 1000 MILLILITER(S): at 23:13

## 2025-08-11 VITALS
DIASTOLIC BLOOD PRESSURE: 91 MMHG | RESPIRATION RATE: 18 BRPM | TEMPERATURE: 98 F | SYSTOLIC BLOOD PRESSURE: 138 MMHG | OXYGEN SATURATION: 96 % | HEART RATE: 94 BPM

## 2025-08-11 LAB
FLUAV AG NPH QL: SIGNIFICANT CHANGE UP
FLUBV AG NPH QL: SIGNIFICANT CHANGE UP
RSV RNA NPH QL NAA+NON-PROBE: SIGNIFICANT CHANGE UP
SARS-COV-2 RNA SPEC QL NAA+PROBE: SIGNIFICANT CHANGE UP

## 2025-08-11 PROCEDURE — 70491 CT SOFT TISSUE NECK W/DYE: CPT | Mod: 26

## 2025-08-11 RX ORDER — KETOROLAC TROMETHAMINE 30 MG/ML
15 INJECTION, SOLUTION INTRAMUSCULAR; INTRAVENOUS ONCE
Refills: 0 | Status: DISCONTINUED | OUTPATIENT
Start: 2025-08-11 | End: 2025-08-11

## 2025-08-11 RX ADMIN — KETOROLAC TROMETHAMINE 15 MILLIGRAM(S): 30 INJECTION, SOLUTION INTRAMUSCULAR; INTRAVENOUS at 04:05

## 2025-09-11 ENCOUNTER — EMERGENCY (EMERGENCY)
Facility: HOSPITAL | Age: 50
LOS: 1 days | End: 2025-09-11
Attending: EMERGENCY MEDICINE | Admitting: EMERGENCY MEDICINE
Payer: MEDICAID

## 2025-09-11 VITALS
HEART RATE: 101 BPM | WEIGHT: 164.91 LBS | SYSTOLIC BLOOD PRESSURE: 134 MMHG | DIASTOLIC BLOOD PRESSURE: 82 MMHG | OXYGEN SATURATION: 99 % | RESPIRATION RATE: 16 BRPM | TEMPERATURE: 98 F

## 2025-09-11 DIAGNOSIS — R22.1 LOCALIZED SWELLING, MASS AND LUMP, NECK: ICD-10-CM

## 2025-09-11 DIAGNOSIS — R59.0 LOCALIZED ENLARGED LYMPH NODES: ICD-10-CM

## 2025-09-11 DIAGNOSIS — R51.9 HEADACHE, UNSPECIFIED: ICD-10-CM

## 2025-09-11 DIAGNOSIS — Z98.890 OTHER SPECIFIED POSTPROCEDURAL STATES: Chronic | ICD-10-CM

## 2025-09-11 PROCEDURE — 99283 EMERGENCY DEPT VISIT LOW MDM: CPT

## 2025-09-11 PROCEDURE — 99284 EMERGENCY DEPT VISIT MOD MDM: CPT

## 2025-09-11 RX ORDER — TRAMADOL HYDROCHLORIDE 50 MG/1
25 TABLET, FILM COATED ORAL ONCE
Refills: 0 | Status: DISCONTINUED | OUTPATIENT
Start: 2025-09-11 | End: 2025-09-11

## 2025-09-11 RX ADMIN — TRAMADOL HYDROCHLORIDE 25 MILLIGRAM(S): 50 TABLET, FILM COATED ORAL at 04:36

## (undated) DEVICE — DRSG COBAN 1" LF NONSTERILE

## (undated) DEVICE — VENODYNE/SCD SLEEVE CALF MEDIUM

## (undated) DEVICE — ELCTR BOVIE PENCIL SMOKE EVACUATION

## (undated) DEVICE — PREP BETADINE KIT

## (undated) DEVICE — SUT CHROMIC 4-0 27" RB-1

## (undated) DEVICE — GLV 7.5 PROTEXIS (WHITE)

## (undated) DEVICE — DRSG KLING 2"

## (undated) DEVICE — SUT CHROMIC 3-0 27" SH

## (undated) DEVICE — PACK GENERAL MINOR

## (undated) DEVICE — SUT CHROMIC 4-0 27" SH-1

## (undated) DEVICE — DRSG XEROFORM 1 X 8"

## (undated) DEVICE — WARMING BLANKET UPPER ADULT